# Patient Record
Sex: MALE | Race: WHITE | Employment: OTHER | URBAN - METROPOLITAN AREA
[De-identification: names, ages, dates, MRNs, and addresses within clinical notes are randomized per-mention and may not be internally consistent; named-entity substitution may affect disease eponyms.]

---

## 2017-03-14 PROBLEM — S71.151A DOG BITE OF RIGHT THIGH: Status: ACTIVE | Noted: 2017-03-14
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
## 2019-01-01 ENCOUNTER — APPOINTMENT (OUTPATIENT)
Dept: CT IMAGING | Age: 74
DRG: 378 | End: 2019-01-01
Payer: MEDICARE

## 2019-01-01 ENCOUNTER — ANESTHESIA (OUTPATIENT)
Dept: ENDOSCOPY | Age: 74
End: 2019-01-01
Payer: MEDICARE

## 2019-01-01 ENCOUNTER — ANESTHESIA EVENT (OUTPATIENT)
Dept: ENDOSCOPY | Age: 74
End: 2019-01-01
Payer: MEDICARE

## 2019-01-01 ENCOUNTER — TELEPHONE (OUTPATIENT)
Dept: CARDIOLOGY CLINIC | Age: 74
End: 2019-01-01

## 2019-01-01 ENCOUNTER — APPOINTMENT (OUTPATIENT)
Dept: GENERAL RADIOLOGY | Age: 74
DRG: 394 | End: 2019-01-01
Payer: MEDICARE

## 2019-01-01 ENCOUNTER — ANESTHESIA (OUTPATIENT)
Dept: ENDOSCOPY | Age: 74
DRG: 394 | End: 2019-01-01
Payer: MEDICARE

## 2019-01-01 ENCOUNTER — ANESTHESIA (OUTPATIENT)
Dept: ENDOSCOPY | Age: 74
DRG: 378 | End: 2019-01-01
Payer: MEDICARE

## 2019-01-01 ENCOUNTER — ANESTHESIA EVENT (OUTPATIENT)
Dept: ENDOSCOPY | Age: 74
DRG: 394 | End: 2019-01-01
Payer: MEDICARE

## 2019-01-01 ENCOUNTER — HOSPITAL ENCOUNTER (INPATIENT)
Age: 74
LOS: 4 days | Discharge: HOME | DRG: 394 | End: 2019-11-03
Attending: EMERGENCY MEDICINE | Admitting: STUDENT IN AN ORGANIZED HEALTH CARE EDUCATION/TRAINING PROGRAM
Payer: MEDICARE

## 2019-01-01 ENCOUNTER — APPOINTMENT (OUTPATIENT)
Dept: CT IMAGING | Age: 74
DRG: 394 | End: 2019-01-01
Payer: MEDICARE

## 2019-01-01 ENCOUNTER — ANESTHESIA EVENT (OUTPATIENT)
Dept: ENDOSCOPY | Age: 74
DRG: 378 | End: 2019-01-01
Payer: MEDICARE

## 2019-01-01 ENCOUNTER — HOSPITAL ENCOUNTER (OUTPATIENT)
Age: 74
Setting detail: OUTPATIENT SURGERY
Discharge: HOME | End: 2019-12-09
Attending: INTERNAL MEDICINE | Admitting: INTERNAL MEDICINE
Payer: MEDICARE

## 2019-01-01 ENCOUNTER — HOSPITAL ENCOUNTER (INPATIENT)
Age: 74
LOS: 4 days | Discharge: NURSING, REHAB OR LONG-TERM CARE | DRG: 378 | End: 2019-04-25
Attending: EMERGENCY MEDICINE | Admitting: RADIOLOGY
Payer: MEDICARE

## 2019-01-01 VITALS
RESPIRATION RATE: 18 BRPM | OXYGEN SATURATION: 100 % | SYSTOLIC BLOOD PRESSURE: 108 MMHG | DIASTOLIC BLOOD PRESSURE: 54 MMHG

## 2019-01-01 VITALS
SYSTOLIC BLOOD PRESSURE: 91 MMHG | RESPIRATION RATE: 15 BRPM | OXYGEN SATURATION: 93 % | DIASTOLIC BLOOD PRESSURE: 55 MMHG

## 2019-01-01 VITALS
TEMPERATURE: 98.6 F | SYSTOLIC BLOOD PRESSURE: 131 MMHG | DIASTOLIC BLOOD PRESSURE: 73 MMHG | OXYGEN SATURATION: 97 % | RESPIRATION RATE: 18 BRPM | BODY MASS INDEX: 26.96 KG/M2 | HEIGHT: 69 IN | WEIGHT: 182 LBS | HEART RATE: 65 BPM

## 2019-01-01 VITALS
TEMPERATURE: 97.1 F | BODY MASS INDEX: 26.05 KG/M2 | SYSTOLIC BLOOD PRESSURE: 148 MMHG | OXYGEN SATURATION: 96 % | RESPIRATION RATE: 18 BRPM | WEIGHT: 182 LBS | DIASTOLIC BLOOD PRESSURE: 73 MMHG | HEART RATE: 76 BPM | HEIGHT: 70 IN

## 2019-01-01 VITALS
RESPIRATION RATE: 20 BRPM | WEIGHT: 184 LBS | TEMPERATURE: 96.8 F | OXYGEN SATURATION: 96 % | DIASTOLIC BLOOD PRESSURE: 67 MMHG | HEART RATE: 55 BPM | SYSTOLIC BLOOD PRESSURE: 132 MMHG | HEIGHT: 69 IN | BODY MASS INDEX: 27.25 KG/M2

## 2019-01-01 VITALS
RESPIRATION RATE: 21 BRPM | SYSTOLIC BLOOD PRESSURE: 144 MMHG | OXYGEN SATURATION: 100 % | DIASTOLIC BLOOD PRESSURE: 85 MMHG

## 2019-01-01 DIAGNOSIS — K92.2 GASTROINTESTINAL HEMORRHAGE, UNSPECIFIED GASTROINTESTINAL HEMORRHAGE TYPE: ICD-10-CM

## 2019-01-01 DIAGNOSIS — T83.511A URINARY TRACT INFECTION ASSOCIATED WITH INDWELLING URETHRAL CATHETER, INITIAL ENCOUNTER (HCC): Primary | ICD-10-CM

## 2019-01-01 DIAGNOSIS — C61 PROSTATE CANCER METASTATIC TO MULTIPLE SITES (HCC): ICD-10-CM

## 2019-01-01 DIAGNOSIS — K92.2 GASTROINTESTINAL HEMORRHAGE, UNSPECIFIED GASTROINTESTINAL HEMORRHAGE TYPE: Primary | ICD-10-CM

## 2019-01-01 DIAGNOSIS — N39.0 URINARY TRACT INFECTION ASSOCIATED WITH INDWELLING URETHRAL CATHETER, INITIAL ENCOUNTER (HCC): Primary | ICD-10-CM

## 2019-01-01 DIAGNOSIS — E87.1 HYPONATREMIA: ICD-10-CM

## 2019-01-01 DIAGNOSIS — D64.9 ANEMIA, UNSPECIFIED TYPE: ICD-10-CM

## 2019-01-01 LAB
ABO/RH: NORMAL
ALBUMIN SERPL-MCNC: 2.9 G/DL (ref 3.5–5.2)
ALBUMIN SERPL-MCNC: 3.2 G/DL (ref 3.5–5.2)
ALBUMIN SERPL-MCNC: 3.4 G/DL (ref 3.5–5.2)
ALBUMIN SERPL-MCNC: 3.4 G/DL (ref 3.5–5.2)
ALP BLD-CCNC: 40 U/L (ref 40–129)
ALP BLD-CCNC: 44 U/L (ref 40–129)
ALP BLD-CCNC: 51 U/L (ref 40–129)
ALP BLD-CCNC: 53 U/L (ref 40–129)
ALT SERPL-CCNC: 5 U/L (ref 0–40)
ALT SERPL-CCNC: 8 U/L (ref 0–40)
ALT SERPL-CCNC: 9 U/L (ref 0–40)
ALT SERPL-CCNC: 9 U/L (ref 0–40)
AMORPHOUS: ABNORMAL
ANION GAP SERPL CALCULATED.3IONS-SCNC: 11 MMOL/L (ref 7–16)
ANION GAP SERPL CALCULATED.3IONS-SCNC: 12 MMOL/L (ref 7–16)
ANION GAP SERPL CALCULATED.3IONS-SCNC: 14 MMOL/L (ref 7–16)
ANION GAP SERPL CALCULATED.3IONS-SCNC: 15 MMOL/L (ref 7–16)
ANION GAP SERPL CALCULATED.3IONS-SCNC: 8 MMOL/L (ref 7–16)
ANION GAP SERPL CALCULATED.3IONS-SCNC: 9 MMOL/L (ref 7–16)
ANION GAP SERPL CALCULATED.3IONS-SCNC: 9 MMOL/L (ref 7–16)
ANTIBODY SCREEN: NORMAL
APTT: 34.9 SEC (ref 24.5–35.1)
AST SERPL-CCNC: 11 U/L (ref 0–39)
AST SERPL-CCNC: 13 U/L (ref 0–39)
AST SERPL-CCNC: 13 U/L (ref 0–39)
AST SERPL-CCNC: 15 U/L (ref 0–39)
BACTERIA: ABNORMAL /HPF
BASOPHILS ABSOLUTE: 0 E9/L (ref 0–0.2)
BASOPHILS ABSOLUTE: 0 E9/L (ref 0–0.2)
BASOPHILS RELATIVE PERCENT: 0.1 % (ref 0–2)
BASOPHILS RELATIVE PERCENT: 0.3 % (ref 0–2)
BILIRUB SERPL-MCNC: 0.4 MG/DL (ref 0–1.2)
BILIRUB SERPL-MCNC: 0.5 MG/DL (ref 0–1.2)
BILIRUBIN DIRECT: <0.2 MG/DL (ref 0–0.3)
BILIRUBIN URINE: ABNORMAL
BILIRUBIN URINE: NEGATIVE
BILIRUBIN URINE: NEGATIVE
BILIRUBIN, INDIRECT: ABNORMAL MG/DL (ref 0–1)
BLOOD, URINE: ABNORMAL
BUN BLDV-MCNC: 10 MG/DL (ref 8–23)
BUN BLDV-MCNC: 11 MG/DL (ref 8–23)
BUN BLDV-MCNC: 12 MG/DL (ref 8–23)
BUN BLDV-MCNC: 13 MG/DL (ref 8–23)
BUN BLDV-MCNC: 16 MG/DL (ref 8–23)
BUN BLDV-MCNC: 17 MG/DL (ref 8–23)
BUN BLDV-MCNC: 21 MG/DL (ref 8–23)
BURR CELLS: ABNORMAL
CALCIUM SERPL-MCNC: 6.3 MG/DL (ref 8.6–10.2)
CALCIUM SERPL-MCNC: 6.6 MG/DL (ref 8.6–10.2)
CALCIUM SERPL-MCNC: 6.6 MG/DL (ref 8.6–10.2)
CALCIUM SERPL-MCNC: 7.2 MG/DL (ref 8.6–10.2)
CALCIUM SERPL-MCNC: 8 MG/DL (ref 8.6–10.2)
CALCIUM SERPL-MCNC: 8.1 MG/DL (ref 8.6–10.2)
CALCIUM SERPL-MCNC: 8.1 MG/DL (ref 8.6–10.2)
CASTS: ABNORMAL /LPF
CEA: 85.7 NG/ML (ref 0–5.2)
CHLORIDE BLD-SCNC: 102 MMOL/L (ref 98–107)
CHLORIDE BLD-SCNC: 104 MMOL/L (ref 98–107)
CHLORIDE BLD-SCNC: 107 MMOL/L (ref 98–107)
CHLORIDE BLD-SCNC: 107 MMOL/L (ref 98–107)
CHLORIDE BLD-SCNC: 108 MMOL/L (ref 98–107)
CHLORIDE BLD-SCNC: 110 MMOL/L (ref 98–107)
CHLORIDE BLD-SCNC: 97 MMOL/L (ref 98–107)
CLARITY: ABNORMAL
CLARITY: ABNORMAL
CLARITY: CLEAR
CO2: 18 MMOL/L (ref 22–29)
CO2: 21 MMOL/L (ref 22–29)
CO2: 22 MMOL/L (ref 22–29)
CO2: 23 MMOL/L (ref 22–29)
CO2: 23 MMOL/L (ref 22–29)
CO2: 24 MMOL/L (ref 22–29)
COLOR: YELLOW
CREAT SERPL-MCNC: 0.6 MG/DL (ref 0.7–1.2)
CREAT SERPL-MCNC: 0.7 MG/DL (ref 0.7–1.2)
CREAT SERPL-MCNC: 0.8 MG/DL (ref 0.7–1.2)
CREAT SERPL-MCNC: 0.9 MG/DL (ref 0.7–1.2)
EKG ATRIAL RATE: 49 BPM
EKG ATRIAL RATE: 55 BPM
EKG ATRIAL RATE: 75 BPM
EKG P AXIS: 28 DEGREES
EKG P AXIS: 30 DEGREES
EKG P AXIS: 57 DEGREES
EKG P-R INTERVAL: 162 MS
EKG P-R INTERVAL: 172 MS
EKG P-R INTERVAL: 190 MS
EKG Q-T INTERVAL: 418 MS
EKG Q-T INTERVAL: 498 MS
EKG Q-T INTERVAL: 502 MS
EKG QRS DURATION: 104 MS
EKG QRS DURATION: 106 MS
EKG QRS DURATION: 96 MS
EKG QTC CALCULATION (BAZETT): 449 MS
EKG QTC CALCULATION (BAZETT): 466 MS
EKG QTC CALCULATION (BAZETT): 480 MS
EKG R AXIS: -2 DEGREES
EKG R AXIS: 15 DEGREES
EKG R AXIS: 9 DEGREES
EKG T AXIS: -52 DEGREES
EKG T AXIS: 24 DEGREES
EKG T AXIS: 24 DEGREES
EKG VENTRICULAR RATE: 49 BPM
EKG VENTRICULAR RATE: 55 BPM
EKG VENTRICULAR RATE: 75 BPM
EOSINOPHILS ABSOLUTE: 0.16 E9/L (ref 0.05–0.5)
EOSINOPHILS ABSOLUTE: 0.19 E9/L (ref 0.05–0.5)
EOSINOPHILS RELATIVE PERCENT: 2.6 % (ref 0–6)
EOSINOPHILS RELATIVE PERCENT: 2.6 % (ref 0–6)
EPITHELIAL CELLS, UA: ABNORMAL /HPF
EPITHELIAL CELLS, UA: ABNORMAL /HPF
FERRITIN: 59 NG/ML
FOLATE: 16.5 NG/ML (ref 4.8–24.2)
GFR AFRICAN AMERICAN: >60
GFR NON-AFRICAN AMERICAN: >60 ML/MIN/1.73
GLUCOSE BLD-MCNC: 115 MG/DL (ref 74–99)
GLUCOSE BLD-MCNC: 117 MG/DL (ref 74–99)
GLUCOSE BLD-MCNC: 118 MG/DL (ref 74–99)
GLUCOSE BLD-MCNC: 118 MG/DL (ref 74–99)
GLUCOSE BLD-MCNC: 130 MG/DL (ref 74–99)
GLUCOSE BLD-MCNC: 135 MG/DL (ref 74–99)
GLUCOSE BLD-MCNC: 135 MG/DL (ref 74–99)
GLUCOSE BLD-MCNC: 163 MG/DL (ref 74–99)
GLUCOSE URINE: NEGATIVE MG/DL
HCT VFR BLD CALC: 26.1 % (ref 37–54)
HCT VFR BLD CALC: 26.1 % (ref 37–54)
HCT VFR BLD CALC: 26.2 % (ref 37–54)
HCT VFR BLD CALC: 26.3 % (ref 37–54)
HCT VFR BLD CALC: 26.4 % (ref 37–54)
HCT VFR BLD CALC: 27.3 % (ref 37–54)
HCT VFR BLD CALC: 27.7 % (ref 37–54)
HCT VFR BLD CALC: 28.8 % (ref 37–54)
HCT VFR BLD CALC: 30.8 % (ref 37–54)
HCT VFR BLD CALC: 32.4 % (ref 37–54)
HCT VFR BLD CALC: 32.9 % (ref 37–54)
HCT VFR BLD CALC: 33.2 % (ref 37–54)
HCT VFR BLD CALC: 33.3 % (ref 37–54)
HCT VFR BLD CALC: 33.4 % (ref 37–54)
HCT VFR BLD CALC: 33.6 % (ref 37–54)
HCT VFR BLD CALC: 34.6 % (ref 37–54)
HCT VFR BLD CALC: 34.8 % (ref 37–54)
HCT VFR BLD CALC: 35.6 % (ref 37–54)
HCT VFR BLD CALC: 35.7 % (ref 37–54)
HCT VFR BLD CALC: 35.9 % (ref 37–54)
HCT VFR BLD CALC: 36.1 % (ref 37–54)
HCT VFR BLD CALC: 36.7 % (ref 37–54)
HCT VFR BLD CALC: 36.8 % (ref 37–54)
HCT VFR BLD CALC: 38.2 % (ref 37–54)
HCT VFR BLD CALC: 38.6 % (ref 37–54)
HEMOGLOBIN: 10.2 G/DL (ref 12.5–16.5)
HEMOGLOBIN: 10.7 G/DL (ref 12.5–16.5)
HEMOGLOBIN: 10.8 G/DL (ref 12.5–16.5)
HEMOGLOBIN: 10.9 G/DL (ref 12.5–16.5)
HEMOGLOBIN: 11.1 G/DL (ref 12.5–16.5)
HEMOGLOBIN: 11.3 G/DL (ref 12.5–16.5)
HEMOGLOBIN: 11.4 G/DL (ref 12.5–16.5)
HEMOGLOBIN: 11.4 G/DL (ref 12.5–16.5)
HEMOGLOBIN: 11.7 G/DL (ref 12.5–16.5)
HEMOGLOBIN: 11.8 G/DL (ref 12.5–16.5)
HEMOGLOBIN: 12 G/DL (ref 12.5–16.5)
HEMOGLOBIN: 12.4 G/DL (ref 12.5–16.5)
HEMOGLOBIN: 12.5 G/DL (ref 12.5–16.5)
HEMOGLOBIN: 8.1 G/DL (ref 12.5–16.5)
HEMOGLOBIN: 8.2 G/DL (ref 12.5–16.5)
HEMOGLOBIN: 8.3 G/DL (ref 12.5–16.5)
HEMOGLOBIN: 8.3 G/DL (ref 12.5–16.5)
HEMOGLOBIN: 8.4 G/DL (ref 12.5–16.5)
HEMOGLOBIN: 8.6 G/DL (ref 12.5–16.5)
HEMOGLOBIN: 8.6 G/DL (ref 12.5–16.5)
HEMOGLOBIN: 8.9 G/DL (ref 12.5–16.5)
INR BLD: 1
INR BLD: 1.2
IRON SATURATION: 10 % (ref 20–55)
IRON: 27 MCG/DL (ref 59–158)
KETONES, URINE: >=80 MG/DL
KETONES, URINE: ABNORMAL MG/DL
KETONES, URINE: NEGATIVE MG/DL
LACTIC ACID, SEPSIS: 3.2 MMOL/L (ref 0.5–1.9)
LACTIC ACID: 1.2 MMOL/L (ref 0.5–2.2)
LACTIC ACID: 1.4 MMOL/L (ref 0.5–2.2)
LEUKOCYTE ESTERASE, URINE: ABNORMAL
LIPASE: 32 U/L (ref 13–60)
LYMPHOCYTES ABSOLUTE: 0.29 E9/L (ref 1.5–4)
LYMPHOCYTES ABSOLUTE: 0.32 E9/L (ref 1.5–4)
LYMPHOCYTES RELATIVE PERCENT: 3.5 % (ref 20–42)
LYMPHOCYTES RELATIVE PERCENT: 5.2 % (ref 20–42)
MAGNESIUM: 1.7 MG/DL (ref 1.6–2.6)
MAGNESIUM: 2 MG/DL (ref 1.6–2.6)
MAGNESIUM: 2.3 MG/DL (ref 1.6–2.6)
MAGNESIUM: 3 MG/DL (ref 1.6–2.6)
MCH RBC QN AUTO: 29 PG (ref 26–35)
MCH RBC QN AUTO: 30.4 PG (ref 26–35)
MCH RBC QN AUTO: 30.7 PG (ref 26–35)
MCH RBC QN AUTO: 31 PG (ref 26–35)
MCHC RBC AUTO-ENTMCNC: 31.5 % (ref 32–34.5)
MCHC RBC AUTO-ENTMCNC: 31.6 % (ref 32–34.5)
MCHC RBC AUTO-ENTMCNC: 31.8 % (ref 32–34.5)
MCHC RBC AUTO-ENTMCNC: 33 % (ref 32–34.5)
MCV RBC AUTO: 87.7 FL (ref 80–99.9)
MCV RBC AUTO: 96.5 FL (ref 80–99.9)
MCV RBC AUTO: 97.4 FL (ref 80–99.9)
MCV RBC AUTO: 97.4 FL (ref 80–99.9)
MONOCYTES ABSOLUTE: 0.25 E9/L (ref 0.1–0.95)
MONOCYTES ABSOLUTE: 0.58 E9/L (ref 0.1–0.95)
MONOCYTES RELATIVE PERCENT: 4.3 % (ref 2–12)
MONOCYTES RELATIVE PERCENT: 7.8 % (ref 2–12)
MYELOCYTE PERCENT: 1.7 % (ref 0–0)
NEUTROPHILS ABSOLUTE: 5.54 E9/L (ref 1.8–7.3)
NEUTROPHILS ABSOLUTE: 6.19 E9/L (ref 1.8–7.3)
NEUTROPHILS RELATIVE PERCENT: 84.3 % (ref 43–80)
NEUTROPHILS RELATIVE PERCENT: 87.8 % (ref 43–80)
NITRITE, URINE: NEGATIVE
NITRITE, URINE: POSITIVE
NITRITE, URINE: POSITIVE
OCCULT BLOOD SCREENING: NORMAL
ORGANISM: ABNORMAL
OVALOCYTES: ABNORMAL
PDW BLD-RTO: 13.2 FL (ref 11.5–15)
PDW BLD-RTO: 16.2 FL (ref 11.5–15)
PDW BLD-RTO: 16.3 FL (ref 11.5–15)
PDW BLD-RTO: 16.8 FL (ref 11.5–15)
PH UA: 5.5 (ref 5–9)
PH UA: 6 (ref 5–9)
PH UA: 7.5 (ref 5–9)
PLATELET # BLD: 196 E9/L (ref 130–450)
PLATELET # BLD: 237 E9/L (ref 130–450)
PLATELET # BLD: 256 E9/L (ref 130–450)
PLATELET # BLD: 271 E9/L (ref 130–450)
PMV BLD AUTO: 8.6 FL (ref 7–12)
PMV BLD AUTO: 8.6 FL (ref 7–12)
PMV BLD AUTO: 8.8 FL (ref 7–12)
PMV BLD AUTO: 8.9 FL (ref 7–12)
POC ANION GAP: 13 MMOL/L (ref 7–16)
POC BUN: 15 MG/DL (ref 8–23)
POC CHLORIDE: 103 MMOL/L (ref 100–108)
POC CREATININE: 0.8 MG/DL (ref 0.7–1.2)
POC POTASSIUM: 3.7 MMOL/L (ref 3.5–5)
POC SODIUM: 137 MMOL/L (ref 132–146)
POIKILOCYTES: ABNORMAL
POIKILOCYTES: ABNORMAL
POTASSIUM REFLEX MAGNESIUM: 3.5 MMOL/L (ref 3.5–5)
POTASSIUM REFLEX MAGNESIUM: 3.6 MMOL/L (ref 3.5–5)
POTASSIUM REFLEX MAGNESIUM: 4 MMOL/L (ref 3.5–5)
POTASSIUM SERPL-SCNC: 2.8 MMOL/L (ref 3.5–5)
POTASSIUM SERPL-SCNC: 3.2 MMOL/L (ref 3.5–5)
POTASSIUM SERPL-SCNC: 3.3 MMOL/L (ref 3.5–5)
POTASSIUM SERPL-SCNC: 3.4 MMOL/L (ref 3.5–5)
POTASSIUM SERPL-SCNC: 3.9 MMOL/L (ref 3.5–5)
PROTEIN UA: 100 MG/DL
PROTEIN UA: 30 MG/DL
PROTEIN UA: 30 MG/DL
PROTHROMBIN TIME: 11.6 SEC (ref 9.3–12.4)
PROTHROMBIN TIME: 13.3 SEC (ref 9.3–12.4)
RBC # BLD: 2.7 E12/L (ref 3.8–5.8)
RBC # BLD: 2.71 E12/L (ref 3.8–5.8)
RBC # BLD: 2.83 E12/L (ref 3.8–5.8)
RBC # BLD: 3.83 E12/L (ref 3.8–5.8)
RBC UA: >20 /HPF (ref 0–2)
RBC UA: ABNORMAL /HPF (ref 0–2)
RBC UA: ABNORMAL /HPF (ref 0–2)
SODIUM BLD-SCNC: 131 MMOL/L (ref 132–146)
SODIUM BLD-SCNC: 135 MMOL/L (ref 132–146)
SODIUM BLD-SCNC: 139 MMOL/L (ref 132–146)
SODIUM BLD-SCNC: 139 MMOL/L (ref 132–146)
SODIUM BLD-SCNC: 140 MMOL/L (ref 132–146)
SODIUM BLD-SCNC: 140 MMOL/L (ref 132–146)
SODIUM BLD-SCNC: 141 MMOL/L (ref 132–146)
SPECIFIC GRAVITY UA: 1.01 (ref 1–1.03)
SPECIFIC GRAVITY UA: 1.01 (ref 1–1.03)
SPECIFIC GRAVITY UA: 1.02 (ref 1–1.03)
T4 TOTAL: 8.5 MCG/DL (ref 4.5–11.7)
TEAR DROP CELLS: ABNORMAL
TOTAL IRON BINDING CAPACITY: 278 MCG/DL (ref 250–450)
TOTAL PROTEIN: 5.1 G/DL (ref 6.4–8.3)
TOTAL PROTEIN: 5.6 G/DL (ref 6.4–8.3)
TOTAL PROTEIN: 5.8 G/DL (ref 6.4–8.3)
TOTAL PROTEIN: 5.9 G/DL (ref 6.4–8.3)
TROPONIN: <0.01 NG/ML (ref 0–0.03)
TSH SERPL DL<=0.05 MIU/L-ACNC: 3.34 UIU/ML (ref 0.27–4.2)
URINE CULTURE, ROUTINE: ABNORMAL
URINE CULTURE, ROUTINE: NORMAL
URINE CULTURE, ROUTINE: NORMAL
UROBILINOGEN, URINE: 0.2 E.U./DL
VITAMIN B-12: 404 PG/ML (ref 211–946)
WBC # BLD: 5.1 E9/L (ref 4.5–11.5)
WBC # BLD: 6.3 E9/L (ref 4.5–11.5)
WBC # BLD: 7.2 E9/L (ref 4.5–11.5)
WBC # BLD: 9.4 E9/L (ref 4.5–11.5)
WBC UA: >20 /HPF (ref 0–5)
WBC UA: >20 /HPF (ref 0–5)
WBC UA: ABNORMAL /HPF (ref 0–5)

## 2019-01-01 PROCEDURE — 2709999900 CT NEEDLE BIOPSY LIVER PERCUTANEOUS

## 2019-01-01 PROCEDURE — 1200000000 HC SEMI PRIVATE

## 2019-01-01 PROCEDURE — 83540 ASSAY OF IRON: CPT

## 2019-01-01 PROCEDURE — 86900 BLOOD TYPING SEROLOGIC ABO: CPT

## 2019-01-01 PROCEDURE — 36415 COLL VENOUS BLD VENIPUNCTURE: CPT

## 2019-01-01 PROCEDURE — 6360000002 HC RX W HCPCS: Performed by: INTERNAL MEDICINE

## 2019-01-01 PROCEDURE — 85018 HEMOGLOBIN: CPT

## 2019-01-01 PROCEDURE — 3700000000 HC ANESTHESIA ATTENDED CARE: Performed by: INTERNAL MEDICINE

## 2019-01-01 PROCEDURE — 97162 PT EVAL MOD COMPLEX 30 MIN: CPT | Performed by: PHYSICAL THERAPIST

## 2019-01-01 PROCEDURE — 7100000000 HC PACU RECOVERY - FIRST 15 MIN: Performed by: SURGERY

## 2019-01-01 PROCEDURE — 2580000003 HC RX 258: Performed by: NURSE PRACTITIONER

## 2019-01-01 PROCEDURE — 0FB03ZX EXCISION OF LIVER, PERCUTANEOUS APPROACH, DIAGNOSTIC: ICD-10-PCS | Performed by: RADIOLOGY

## 2019-01-01 PROCEDURE — 80048 BASIC METABOLIC PNL TOTAL CA: CPT

## 2019-01-01 PROCEDURE — 83605 ASSAY OF LACTIC ACID: CPT

## 2019-01-01 PROCEDURE — C9113 INJ PANTOPRAZOLE SODIUM, VIA: HCPCS | Performed by: STUDENT IN AN ORGANIZED HEALTH CARE EDUCATION/TRAINING PROGRAM

## 2019-01-01 PROCEDURE — 6370000000 HC RX 637 (ALT 250 FOR IP): Performed by: STUDENT IN AN ORGANIZED HEALTH CARE EDUCATION/TRAINING PROGRAM

## 2019-01-01 PROCEDURE — 6370000000 HC RX 637 (ALT 250 FOR IP): Performed by: INTERNAL MEDICINE

## 2019-01-01 PROCEDURE — 7100000010 HC PHASE II RECOVERY - FIRST 15 MIN: Performed by: INTERNAL MEDICINE

## 2019-01-01 PROCEDURE — 83550 IRON BINDING TEST: CPT

## 2019-01-01 PROCEDURE — 85610 PROTHROMBIN TIME: CPT

## 2019-01-01 PROCEDURE — 93010 ELECTROCARDIOGRAM REPORT: CPT | Performed by: INTERNAL MEDICINE

## 2019-01-01 PROCEDURE — 2720000010 HC SURG SUPPLY STERILE: Performed by: INTERNAL MEDICINE

## 2019-01-01 PROCEDURE — 88341 IMHCHEM/IMCYTCHM EA ADD ANTB: CPT

## 2019-01-01 PROCEDURE — 3700000001 HC ADD 15 MINUTES (ANESTHESIA): Performed by: INTERNAL MEDICINE

## 2019-01-01 PROCEDURE — 85014 HEMATOCRIT: CPT

## 2019-01-01 PROCEDURE — 6370000000 HC RX 637 (ALT 250 FOR IP): Performed by: NURSE PRACTITIONER

## 2019-01-01 PROCEDURE — 74177 CT ABD & PELVIS W/CONTRAST: CPT

## 2019-01-01 PROCEDURE — 6360000002 HC RX W HCPCS: Performed by: FAMILY MEDICINE

## 2019-01-01 PROCEDURE — 2709999900 HC NON-CHARGEABLE SUPPLY: Performed by: INTERNAL MEDICINE

## 2019-01-01 PROCEDURE — 99233 SBSQ HOSP IP/OBS HIGH 50: CPT | Performed by: INTERNAL MEDICINE

## 2019-01-01 PROCEDURE — 87088 URINE BACTERIA CULTURE: CPT

## 2019-01-01 PROCEDURE — APPSS45 APP SPLIT SHARED TIME 31-45 MINUTES: Performed by: NURSE PRACTITIONER

## 2019-01-01 PROCEDURE — 93005 ELECTROCARDIOGRAM TRACING: CPT | Performed by: INTERNAL MEDICINE

## 2019-01-01 PROCEDURE — 81001 URINALYSIS AUTO W/SCOPE: CPT

## 2019-01-01 PROCEDURE — 7100000001 HC PACU RECOVERY - ADDTL 15 MIN: Performed by: SURGERY

## 2019-01-01 PROCEDURE — 2580000003 HC RX 258: Performed by: INTERNAL MEDICINE

## 2019-01-01 PROCEDURE — 6360000004 HC RX CONTRAST MEDICATION: Performed by: RADIOLOGY

## 2019-01-01 PROCEDURE — 99239 HOSP IP/OBS DSCHRG MGMT >30: CPT | Performed by: INTERNAL MEDICINE

## 2019-01-01 PROCEDURE — 2580000003 HC RX 258: Performed by: ANESTHESIOLOGY

## 2019-01-01 PROCEDURE — 6360000002 HC RX W HCPCS: Performed by: EMERGENCY MEDICINE

## 2019-01-01 PROCEDURE — 99285 EMERGENCY DEPT VISIT HI MDM: CPT

## 2019-01-01 PROCEDURE — 82728 ASSAY OF FERRITIN: CPT

## 2019-01-01 PROCEDURE — 86901 BLOOD TYPING SEROLOGIC RH(D): CPT

## 2019-01-01 PROCEDURE — 84484 ASSAY OF TROPONIN QUANT: CPT

## 2019-01-01 PROCEDURE — 84436 ASSAY OF TOTAL THYROXINE: CPT

## 2019-01-01 PROCEDURE — APPSS30 APP SPLIT SHARED TIME 16-30 MINUTES: Performed by: NURSE PRACTITIONER

## 2019-01-01 PROCEDURE — 88307 TISSUE EXAM BY PATHOLOGIST: CPT

## 2019-01-01 PROCEDURE — 77012 CT SCAN FOR NEEDLE BIOPSY: CPT

## 2019-01-01 PROCEDURE — 3700000001 HC ADD 15 MINUTES (ANESTHESIA): Performed by: SURGERY

## 2019-01-01 PROCEDURE — 6360000002 HC RX W HCPCS: Performed by: STUDENT IN AN ORGANIZED HEALTH CARE EDUCATION/TRAINING PROGRAM

## 2019-01-01 PROCEDURE — G0328 FECAL BLOOD SCRN IMMUNOASSAY: HCPCS

## 2019-01-01 PROCEDURE — 82565 ASSAY OF CREATININE: CPT

## 2019-01-01 PROCEDURE — 83735 ASSAY OF MAGNESIUM: CPT

## 2019-01-01 PROCEDURE — 6370000000 HC RX 637 (ALT 250 FOR IP): Performed by: FAMILY MEDICINE

## 2019-01-01 PROCEDURE — 94660 CPAP INITIATION&MGMT: CPT

## 2019-01-01 PROCEDURE — 7100000011 HC PHASE II RECOVERY - ADDTL 15 MIN: Performed by: INTERNAL MEDICINE

## 2019-01-01 PROCEDURE — 82746 ASSAY OF FOLIC ACID SERUM: CPT

## 2019-01-01 PROCEDURE — 96365 THER/PROPH/DIAG IV INF INIT: CPT

## 2019-01-01 PROCEDURE — 2580000003 HC RX 258: Performed by: FAMILY MEDICINE

## 2019-01-01 PROCEDURE — 87077 CULTURE AEROBIC IDENTIFY: CPT

## 2019-01-01 PROCEDURE — 86850 RBC ANTIBODY SCREEN: CPT

## 2019-01-01 PROCEDURE — 2580000003 HC RX 258: Performed by: EMERGENCY MEDICINE

## 2019-01-01 PROCEDURE — 2580000003 HC RX 258: Performed by: RADIOLOGY

## 2019-01-01 PROCEDURE — 84443 ASSAY THYROID STIM HORMONE: CPT

## 2019-01-01 PROCEDURE — 82947 ASSAY GLUCOSE BLOOD QUANT: CPT

## 2019-01-01 PROCEDURE — 97530 THERAPEUTIC ACTIVITIES: CPT | Performed by: PHYSICAL THERAPIST

## 2019-01-01 PROCEDURE — 80051 ELECTROLYTE PANEL: CPT

## 2019-01-01 PROCEDURE — 97530 THERAPEUTIC ACTIVITIES: CPT

## 2019-01-01 PROCEDURE — 85025 COMPLETE CBC W/AUTO DIFF WBC: CPT

## 2019-01-01 PROCEDURE — 97166 OT EVAL MOD COMPLEX 45 MIN: CPT

## 2019-01-01 PROCEDURE — 6360000002 HC RX W HCPCS

## 2019-01-01 PROCEDURE — 82607 VITAMIN B-12: CPT

## 2019-01-01 PROCEDURE — 0DJD8ZZ INSPECTION OF LOWER INTESTINAL TRACT, VIA NATURAL OR ARTIFICIAL OPENING ENDOSCOPIC: ICD-10-PCS | Performed by: SURGERY

## 2019-01-01 PROCEDURE — 6360000002 HC RX W HCPCS: Performed by: NURSE ANESTHETIST, CERTIFIED REGISTERED

## 2019-01-01 PROCEDURE — 85730 THROMBOPLASTIN TIME PARTIAL: CPT

## 2019-01-01 PROCEDURE — 3609008400 HC SIGMOIDOSCOPY DIAGNOSTIC: Performed by: INTERNAL MEDICINE

## 2019-01-01 PROCEDURE — 82378 CARCINOEMBRYONIC ANTIGEN: CPT

## 2019-01-01 PROCEDURE — 3609009500 HC COLONOSCOPY DIAGNOSTIC OR SCREENING: Performed by: SURGERY

## 2019-01-01 PROCEDURE — 80076 HEPATIC FUNCTION PANEL: CPT

## 2019-01-01 PROCEDURE — 6370000000 HC RX 637 (ALT 250 FOR IP): Performed by: SURGERY

## 2019-01-01 PROCEDURE — 80053 COMPREHEN METABOLIC PANEL: CPT

## 2019-01-01 PROCEDURE — 2580000003 HC RX 258: Performed by: STUDENT IN AN ORGANIZED HEALTH CARE EDUCATION/TRAINING PROGRAM

## 2019-01-01 PROCEDURE — 2500000003 HC RX 250 WO HCPCS: Performed by: RADIOLOGY

## 2019-01-01 PROCEDURE — 88342 IMHCHEM/IMCYTCHM 1ST ANTB: CPT

## 2019-01-01 PROCEDURE — 84132 ASSAY OF SERUM POTASSIUM: CPT

## 2019-01-01 PROCEDURE — 85027 COMPLETE CBC AUTOMATED: CPT

## 2019-01-01 PROCEDURE — 71045 X-RAY EXAM CHEST 1 VIEW: CPT

## 2019-01-01 PROCEDURE — 2709999900 HC NON-CHARGEABLE SUPPLY: Performed by: SURGERY

## 2019-01-01 PROCEDURE — 93005 ELECTROCARDIOGRAM TRACING: CPT | Performed by: EMERGENCY MEDICINE

## 2019-01-01 PROCEDURE — 93005 ELECTROCARDIOGRAM TRACING: CPT | Performed by: NURSE PRACTITIONER

## 2019-01-01 PROCEDURE — 99223 1ST HOSP IP/OBS HIGH 75: CPT | Performed by: INTERNAL MEDICINE

## 2019-01-01 PROCEDURE — 0DJD8ZZ INSPECTION OF LOWER INTESTINAL TRACT, VIA NATURAL OR ARTIFICIAL OPENING ENDOSCOPIC: ICD-10-PCS | Performed by: INTERNAL MEDICINE

## 2019-01-01 PROCEDURE — 84520 ASSAY OF UREA NITROGEN: CPT

## 2019-01-01 PROCEDURE — 87186 SC STD MICRODIL/AGAR DIL: CPT

## 2019-01-01 PROCEDURE — 88360 TUMOR IMMUNOHISTOCHEM/MANUAL: CPT

## 2019-01-01 PROCEDURE — 83690 ASSAY OF LIPASE: CPT

## 2019-01-01 PROCEDURE — 3609009900 HC COLONOSCOPY W/CONTROL BLEEDING ANY METHOD: Performed by: INTERNAL MEDICINE

## 2019-01-01 PROCEDURE — 3700000000 HC ANESTHESIA ATTENDED CARE: Performed by: SURGERY

## 2019-01-01 RX ORDER — MONTELUKAST SODIUM 10 MG/1
10 TABLET ORAL NIGHTLY
Status: DISCONTINUED | OUTPATIENT
Start: 2019-01-01 | End: 2019-01-01 | Stop reason: HOSPADM

## 2019-01-01 RX ORDER — METOPROLOL TARTRATE 50 MG/1
100 TABLET, FILM COATED ORAL 2 TIMES DAILY
Status: DISCONTINUED | OUTPATIENT
Start: 2019-01-01 | End: 2019-01-01 | Stop reason: HOSPADM

## 2019-01-01 RX ORDER — OXYCODONE AND ACETAMINOPHEN 10; 325 MG/1; MG/1
1 TABLET ORAL EVERY 6 HOURS PRN
Qty: 28 TABLET | Refills: 0 | Status: SHIPPED | OUTPATIENT
Start: 2019-01-01 | End: 2019-01-01

## 2019-01-01 RX ORDER — OXYBUTYNIN CHLORIDE 5 MG/1
10 TABLET, EXTENDED RELEASE ORAL DAILY PRN
Status: DISCONTINUED | OUTPATIENT
Start: 2019-01-01 | End: 2019-01-01 | Stop reason: HOSPADM

## 2019-01-01 RX ORDER — LIDOCAINE HYDROCHLORIDE 20 MG/ML
12 INJECTION, SOLUTION INFILTRATION; PERINEURAL ONCE
Status: COMPLETED | OUTPATIENT
Start: 2019-01-01 | End: 2019-01-01

## 2019-01-01 RX ORDER — CALCIUM POLYCARBOPHIL 625 MG 625 MG/1
625 TABLET ORAL DAILY
Status: DISCONTINUED | OUTPATIENT
Start: 2019-01-01 | End: 2019-01-01 | Stop reason: HOSPADM

## 2019-01-01 RX ORDER — PROPOFOL 10 MG/ML
INJECTION, EMULSION INTRAVENOUS CONTINUOUS PRN
Status: DISCONTINUED | OUTPATIENT
Start: 2019-01-01 | End: 2019-01-01 | Stop reason: SDUPTHER

## 2019-01-01 RX ORDER — CALCIUM POLYCARBOPHIL 625 MG 625 MG/1
625 TABLET ORAL DAILY
Refills: 0 | COMMUNITY
Start: 2019-01-01 | End: 2019-01-01

## 2019-01-01 RX ORDER — AMLODIPINE BESYLATE 5 MG/1
5 TABLET ORAL DAILY
COMMUNITY
End: 2020-01-01

## 2019-01-01 RX ORDER — ONDANSETRON 4 MG/1
4 TABLET, FILM COATED ORAL DAILY PRN
Status: ON HOLD | COMMUNITY
End: 2020-01-01 | Stop reason: SDUPTHER

## 2019-01-01 RX ORDER — TAMSULOSIN HYDROCHLORIDE 0.4 MG/1
0.8 CAPSULE ORAL NIGHTLY
Status: DISCONTINUED | OUTPATIENT
Start: 2019-01-01 | End: 2019-01-01 | Stop reason: HOSPADM

## 2019-01-01 RX ORDER — CHLORAL HYDRATE 500 MG
1000 CAPSULE ORAL DAILY
Status: DISCONTINUED | OUTPATIENT
Start: 2019-01-01 | End: 2019-01-01 | Stop reason: CLARIF

## 2019-01-01 RX ORDER — MELOXICAM 7.5 MG/1
7.5 TABLET ORAL DAILY
COMMUNITY
End: 2019-01-01 | Stop reason: DRUGHIGH

## 2019-01-01 RX ORDER — AMLODIPINE BESYLATE 5 MG/1
5 TABLET ORAL 2 TIMES DAILY
Status: DISCONTINUED | OUTPATIENT
Start: 2019-01-01 | End: 2019-01-01 | Stop reason: HOSPADM

## 2019-01-01 RX ORDER — SODIUM CHLORIDE, SODIUM LACTATE, POTASSIUM CHLORIDE, CALCIUM CHLORIDE 600; 310; 30; 20 MG/100ML; MG/100ML; MG/100ML; MG/100ML
INJECTION, SOLUTION INTRAVENOUS CONTINUOUS
Status: DISCONTINUED | OUTPATIENT
Start: 2019-01-01 | End: 2019-01-01 | Stop reason: HOSPADM

## 2019-01-01 RX ORDER — POLYETHYLENE GLYCOL 3350, SODIUM CHLORIDE, SODIUM BICARBONATE, POTASSIUM CHLORIDE 420; 11.2; 5.72; 1.48 G/4L; G/4L; G/4L; G/4L
4000 POWDER, FOR SOLUTION ORAL ONCE
Status: COMPLETED | OUTPATIENT
Start: 2019-01-01 | End: 2019-01-01

## 2019-01-01 RX ORDER — HYDROCORTISONE ACETATE 25 MG/1
25 SUPPOSITORY RECTAL 2 TIMES DAILY
Status: DISCONTINUED | OUTPATIENT
Start: 2019-01-01 | End: 2019-01-01 | Stop reason: HOSPADM

## 2019-01-01 RX ORDER — METOPROLOL SUCCINATE 50 MG/1
50 TABLET, EXTENDED RELEASE ORAL DAILY
Status: DISCONTINUED | OUTPATIENT
Start: 2019-01-01 | End: 2019-01-01 | Stop reason: HOSPADM

## 2019-01-01 RX ORDER — PROPOFOL 10 MG/ML
INJECTION, EMULSION INTRAVENOUS PRN
Status: DISCONTINUED | OUTPATIENT
Start: 2019-01-01 | End: 2019-01-01 | Stop reason: SDUPTHER

## 2019-01-01 RX ORDER — PANTOPRAZOLE SODIUM 40 MG/1
40 TABLET, DELAYED RELEASE ORAL DAILY
COMMUNITY
End: 2020-01-01

## 2019-01-01 RX ORDER — PANTOPRAZOLE SODIUM 40 MG/1
40 TABLET, DELAYED RELEASE ORAL DAILY
Status: DISCONTINUED | OUTPATIENT
Start: 2019-01-01 | End: 2019-01-01

## 2019-01-01 RX ORDER — PANTOPRAZOLE SODIUM 40 MG/10ML
80 INJECTION, POWDER, LYOPHILIZED, FOR SOLUTION INTRAVENOUS ONCE
Status: DISCONTINUED | OUTPATIENT
Start: 2019-01-01 | End: 2019-01-01 | Stop reason: CLARIF

## 2019-01-01 RX ORDER — PREGABALIN 50 MG/1
50 CAPSULE ORAL 2 TIMES DAILY
Status: DISCONTINUED | OUTPATIENT
Start: 2019-01-01 | End: 2019-01-01 | Stop reason: HOSPADM

## 2019-01-01 RX ORDER — SODIUM CHLORIDE 0.9 % (FLUSH) 0.9 %
10 SYRINGE (ML) INJECTION PRN
Status: DISCONTINUED | OUTPATIENT
Start: 2019-01-01 | End: 2019-01-01 | Stop reason: HOSPADM

## 2019-01-01 RX ORDER — METOPROLOL SUCCINATE 50 MG/1
50 TABLET, EXTENDED RELEASE ORAL DAILY
COMMUNITY
End: 2019-01-01 | Stop reason: DRUGHIGH

## 2019-01-01 RX ORDER — OXYCODONE HYDROCHLORIDE AND ACETAMINOPHEN 5; 325 MG/1; MG/1
2 TABLET ORAL EVERY 6 HOURS PRN
Status: DISCONTINUED | OUTPATIENT
Start: 2019-01-01 | End: 2019-01-01 | Stop reason: HOSPADM

## 2019-01-01 RX ORDER — SODIUM CHLORIDE 9 MG/ML
INJECTION, SOLUTION INTRAVENOUS CONTINUOUS
Status: DISCONTINUED | OUTPATIENT
Start: 2019-01-01 | End: 2019-01-01 | Stop reason: HOSPADM

## 2019-01-01 RX ORDER — POTASSIUM CHLORIDE 20 MEQ/1
40 TABLET, EXTENDED RELEASE ORAL PRN
Status: DISCONTINUED | OUTPATIENT
Start: 2019-01-01 | End: 2019-01-01 | Stop reason: HOSPADM

## 2019-01-01 RX ORDER — AMLODIPINE BESYLATE 5 MG/1
5 TABLET ORAL DAILY
Status: DISCONTINUED | OUTPATIENT
Start: 2019-01-01 | End: 2019-01-01 | Stop reason: HOSPADM

## 2019-01-01 RX ORDER — CHLORAL HYDRATE 500 MG
1000 CAPSULE ORAL DAILY
COMMUNITY
End: 2020-01-01

## 2019-01-01 RX ORDER — ONDANSETRON 2 MG/ML
4 INJECTION INTRAMUSCULAR; INTRAVENOUS EVERY 6 HOURS PRN
Status: DISCONTINUED | OUTPATIENT
Start: 2019-01-01 | End: 2019-01-01 | Stop reason: HOSPADM

## 2019-01-01 RX ORDER — POTASSIUM CHLORIDE 1500 MG/1
20 TABLET, FILM COATED, EXTENDED RELEASE ORAL EVERY OTHER DAY
COMMUNITY
End: 2019-01-01 | Stop reason: ALTCHOICE

## 2019-01-01 RX ORDER — OXYCODONE AND ACETAMINOPHEN 10; 325 MG/1; MG/1
1 TABLET ORAL EVERY 6 HOURS PRN
Status: ON HOLD | COMMUNITY
End: 2019-01-01 | Stop reason: SDUPTHER

## 2019-01-01 RX ORDER — ACETAMINOPHEN 325 MG/1
650 TABLET ORAL EVERY 4 HOURS PRN
Status: DISCONTINUED | OUTPATIENT
Start: 2019-01-01 | End: 2019-01-01 | Stop reason: HOSPADM

## 2019-01-01 RX ORDER — METOPROLOL TARTRATE 100 MG/1
100 TABLET ORAL 2 TIMES DAILY
Status: ON HOLD | COMMUNITY
End: 2020-01-01 | Stop reason: HOSPADM

## 2019-01-01 RX ORDER — PREGABALIN 50 MG/1
50 CAPSULE ORAL 2 TIMES DAILY
COMMUNITY
End: 2019-01-01 | Stop reason: ALTCHOICE

## 2019-01-01 RX ORDER — 0.9 % SODIUM CHLORIDE 0.9 %
1000 INTRAVENOUS SOLUTION INTRAVENOUS ONCE
Status: COMPLETED | OUTPATIENT
Start: 2019-01-01 | End: 2019-01-01

## 2019-01-01 RX ORDER — POTASSIUM CHLORIDE 7.45 MG/ML
10 INJECTION INTRAVENOUS PRN
Status: DISCONTINUED | OUTPATIENT
Start: 2019-01-01 | End: 2019-01-01 | Stop reason: HOSPADM

## 2019-01-01 RX ORDER — TAMSULOSIN HYDROCHLORIDE 0.4 MG/1
0.8 CAPSULE ORAL NIGHTLY
Status: ON HOLD | COMMUNITY
End: 2020-01-01 | Stop reason: HOSPADM

## 2019-01-01 RX ORDER — PANTOPRAZOLE SODIUM 40 MG/10ML
40 INJECTION, POWDER, LYOPHILIZED, FOR SOLUTION INTRAVENOUS 2 TIMES DAILY
Status: DISCONTINUED | OUTPATIENT
Start: 2019-01-01 | End: 2019-01-01

## 2019-01-01 RX ORDER — FLUTICASONE PROPIONATE 110 UG/1
2 AEROSOL, METERED RESPIRATORY (INHALATION) 2 TIMES DAILY
Status: DISCONTINUED | OUTPATIENT
Start: 2019-01-01 | End: 2019-01-01 | Stop reason: HOSPADM

## 2019-01-01 RX ORDER — AMIODARONE HYDROCHLORIDE 200 MG/1
200 TABLET ORAL DAILY
Status: ON HOLD | COMMUNITY
End: 2020-01-01 | Stop reason: HOSPADM

## 2019-01-01 RX ORDER — OXYCODONE AND ACETAMINOPHEN 10; 325 MG/1; MG/1
1 TABLET ORAL EVERY 6 HOURS PRN
Status: DISCONTINUED | OUTPATIENT
Start: 2019-01-01 | End: 2019-01-01 | Stop reason: HOSPADM

## 2019-01-01 RX ORDER — TAMSULOSIN HYDROCHLORIDE 0.4 MG/1
0.8 CAPSULE ORAL DAILY
Status: DISCONTINUED | OUTPATIENT
Start: 2019-01-01 | End: 2019-01-01 | Stop reason: HOSPADM

## 2019-01-01 RX ORDER — HYDROCORTISONE ACETATE 25 MG/1
25 SUPPOSITORY RECTAL 2 TIMES DAILY
Qty: 60 SUPPOSITORY | Refills: 0 | Status: SHIPPED | OUTPATIENT
Start: 2019-01-01 | End: 2020-01-01

## 2019-01-01 RX ORDER — OXYBUTYNIN CHLORIDE 10 MG/1
10 TABLET, EXTENDED RELEASE ORAL DAILY PRN
COMMUNITY
End: 2020-01-01

## 2019-01-01 RX ORDER — SODIUM CHLORIDE 0.9 % (FLUSH) 0.9 %
10 SYRINGE (ML) INJECTION PRN
Status: COMPLETED | OUTPATIENT
Start: 2019-01-01 | End: 2019-01-01

## 2019-01-01 RX ORDER — MELOXICAM 15 MG/1
15 TABLET ORAL DAILY PRN
Status: ON HOLD | COMMUNITY
End: 2019-01-01 | Stop reason: HOSPADM

## 2019-01-01 RX ORDER — AMIODARONE HYDROCHLORIDE 200 MG/1
200 TABLET ORAL DAILY
Status: DISCONTINUED | OUTPATIENT
Start: 2019-01-01 | End: 2019-01-01 | Stop reason: HOSPADM

## 2019-01-01 RX ORDER — SODIUM CHLORIDE 0.9 % (FLUSH) 0.9 %
10 SYRINGE (ML) INJECTION EVERY 12 HOURS SCHEDULED
Status: DISCONTINUED | OUTPATIENT
Start: 2019-01-01 | End: 2019-01-01 | Stop reason: HOSPADM

## 2019-01-01 RX ORDER — ALBUTEROL SULFATE 2.5 MG/3ML
2.5 SOLUTION RESPIRATORY (INHALATION) EVERY 6 HOURS PRN
Status: DISCONTINUED | OUTPATIENT
Start: 2019-01-01 | End: 2019-01-01 | Stop reason: HOSPADM

## 2019-01-01 RX ORDER — PROPOFOL 10 MG/ML
INJECTION, EMULSION INTRAVENOUS CONTINUOUS PRN
Status: DISCONTINUED | OUTPATIENT
Start: 2019-01-01 | End: 2019-01-01

## 2019-01-01 RX ORDER — POTASSIUM CHLORIDE 7.45 MG/ML
10 INJECTION INTRAVENOUS ONCE
Status: COMPLETED | OUTPATIENT
Start: 2019-01-01 | End: 2019-01-01

## 2019-01-01 RX ORDER — POTASSIUM CHLORIDE 7.45 MG/ML
10 INJECTION INTRAVENOUS
Status: DISCONTINUED | OUTPATIENT
Start: 2019-01-01 | End: 2019-01-01

## 2019-01-01 RX ADMIN — OXYCODONE HYDROCHLORIDE AND ACETAMINOPHEN 2 TABLET: 5; 325 TABLET ORAL at 21:59

## 2019-01-01 RX ADMIN — POTASSIUM CHLORIDE 40 MEQ: 20 TABLET, EXTENDED RELEASE ORAL at 08:45

## 2019-01-01 RX ADMIN — AMIODARONE HYDROCHLORIDE 200 MG: 200 TABLET ORAL at 08:07

## 2019-01-01 RX ADMIN — METOPROLOL SUCCINATE 50 MG: 50 TABLET, EXTENDED RELEASE ORAL at 09:45

## 2019-01-01 RX ADMIN — POTASSIUM CHLORIDE 10 MEQ: 7.46 INJECTION, SOLUTION INTRAVENOUS at 14:05

## 2019-01-01 RX ADMIN — AMLODIPINE BESYLATE 5 MG: 5 TABLET ORAL at 09:08

## 2019-01-01 RX ADMIN — PANTOPRAZOLE SODIUM 40 MG: 40 INJECTION, POWDER, FOR SOLUTION INTRAVENOUS at 08:59

## 2019-01-01 RX ADMIN — OXYCODONE HYDROCHLORIDE AND ACETAMINOPHEN 2 TABLET: 5; 325 TABLET ORAL at 17:22

## 2019-01-01 RX ADMIN — IOPAMIDOL 110 ML: 755 INJECTION, SOLUTION INTRAVENOUS at 02:52

## 2019-01-01 RX ADMIN — PREGABALIN 50 MG: 50 CAPSULE ORAL at 21:30

## 2019-01-01 RX ADMIN — OXYCODONE HYDROCHLORIDE AND ACETAMINOPHEN 2 TABLET: 5; 325 TABLET ORAL at 08:43

## 2019-01-01 RX ADMIN — CEFTRIAXONE SODIUM 1 G: 1 INJECTION, POWDER, FOR SOLUTION INTRAMUSCULAR; INTRAVENOUS at 03:27

## 2019-01-01 RX ADMIN — POTASSIUM CHLORIDE 10 MEQ: 7.46 INJECTION, SOLUTION INTRAVENOUS at 15:53

## 2019-01-01 RX ADMIN — AMLODIPINE BESYLATE 5 MG: 5 TABLET ORAL at 20:59

## 2019-01-01 RX ADMIN — TAMSULOSIN HYDROCHLORIDE 0.8 MG: 0.4 CAPSULE ORAL at 08:50

## 2019-01-01 RX ADMIN — MAGNESIUM CITRATE 296 ML: 1.75 LIQUID ORAL at 20:29

## 2019-01-01 RX ADMIN — MONTELUKAST 10 MG: 10 TABLET, FILM COATED ORAL at 20:59

## 2019-01-01 RX ADMIN — TAMSULOSIN HYDROCHLORIDE 0.8 MG: 0.4 CAPSULE ORAL at 21:12

## 2019-01-01 RX ADMIN — OXYCODONE HYDROCHLORIDE AND ACETAMINOPHEN 2 TABLET: 5; 325 TABLET ORAL at 14:58

## 2019-01-01 RX ADMIN — IOPAMIDOL 80 ML: 755 INJECTION, SOLUTION INTRAVENOUS at 12:15

## 2019-01-01 RX ADMIN — POLYETHYLENE GLYCOL 3350, SODIUM CHLORIDE, SODIUM BICARBONATE AND POTASSIUM CHLORIDE 4000 ML: KIT ORAL at 16:00

## 2019-01-01 RX ADMIN — METOPROLOL TARTRATE 100 MG: 50 TABLET, FILM COATED ORAL at 20:34

## 2019-01-01 RX ADMIN — SODIUM CHLORIDE: 9 INJECTION, SOLUTION INTRAVENOUS at 10:08

## 2019-01-01 RX ADMIN — Medication 10 ML: at 21:02

## 2019-01-01 RX ADMIN — Medication 10 ML: at 08:59

## 2019-01-01 RX ADMIN — PREGABALIN 50 MG: 50 CAPSULE ORAL at 09:08

## 2019-01-01 RX ADMIN — TAMSULOSIN HYDROCHLORIDE 0.8 MG: 0.4 CAPSULE ORAL at 09:44

## 2019-01-01 RX ADMIN — POTASSIUM CHLORIDE 10 MEQ: 7.46 INJECTION, SOLUTION INTRAVENOUS at 09:58

## 2019-01-01 RX ADMIN — PROPOFOL 100 MCG/KG/MIN: 10 INJECTION, EMULSION INTRAVENOUS at 13:15

## 2019-01-01 RX ADMIN — PROPOFOL 30 MG: 10 INJECTION, EMULSION INTRAVENOUS at 15:47

## 2019-01-01 RX ADMIN — TAMSULOSIN HYDROCHLORIDE 0.8 MG: 0.4 CAPSULE ORAL at 20:34

## 2019-01-01 RX ADMIN — AMLODIPINE BESYLATE 5 MG: 5 TABLET ORAL at 20:35

## 2019-01-01 RX ADMIN — METOPROLOL SUCCINATE 50 MG: 50 TABLET, EXTENDED RELEASE ORAL at 08:45

## 2019-01-01 RX ADMIN — SODIUM CHLORIDE: 9 INJECTION, SOLUTION INTRAVENOUS at 17:42

## 2019-01-01 RX ADMIN — BISACODYL 10 MG: 5 TABLET, COATED ORAL at 07:42

## 2019-01-01 RX ADMIN — CEFTRIAXONE SODIUM 1 G: 1 INJECTION, POWDER, FOR SOLUTION INTRAMUSCULAR; INTRAVENOUS at 03:50

## 2019-01-01 RX ADMIN — METOPROLOL SUCCINATE 50 MG: 50 TABLET, EXTENDED RELEASE ORAL at 09:34

## 2019-01-01 RX ADMIN — METOPROLOL TARTRATE 100 MG: 50 TABLET, FILM COATED ORAL at 20:58

## 2019-01-01 RX ADMIN — ENOXAPARIN SODIUM 40 MG: 40 INJECTION SUBCUTANEOUS at 18:26

## 2019-01-01 RX ADMIN — TAMSULOSIN HYDROCHLORIDE 0.8 MG: 0.4 CAPSULE ORAL at 20:59

## 2019-01-01 RX ADMIN — MONTELUKAST 10 MG: 10 TABLET, FILM COATED ORAL at 20:06

## 2019-01-01 RX ADMIN — HYDROCORTISONE ACETATE 25 MG: 25 SUPPOSITORY RECTAL at 14:48

## 2019-01-01 RX ADMIN — MONTELUKAST SODIUM 10 MG: 10 TABLET, FILM COATED ORAL at 21:59

## 2019-01-01 RX ADMIN — PROPOFOL 300 MG: 10 INJECTION, EMULSION INTRAVENOUS at 07:16

## 2019-01-01 RX ADMIN — TAMSULOSIN HYDROCHLORIDE 0.8 MG: 0.4 CAPSULE ORAL at 20:08

## 2019-01-01 RX ADMIN — PREGABALIN 50 MG: 50 CAPSULE ORAL at 21:59

## 2019-01-01 RX ADMIN — ENOXAPARIN SODIUM 40 MG: 40 INJECTION SUBCUTANEOUS at 09:08

## 2019-01-01 RX ADMIN — HYDROCORTISONE ACETATE 25 MG: 25 SUPPOSITORY RECTAL at 21:11

## 2019-01-01 RX ADMIN — METOPROLOL TARTRATE 100 MG: 50 TABLET, FILM COATED ORAL at 09:26

## 2019-01-01 RX ADMIN — HYDROCORTISONE ACETATE 25 MG: 25 SUPPOSITORY RECTAL at 09:08

## 2019-01-01 RX ADMIN — TAMSULOSIN HYDROCHLORIDE 0.8 MG: 0.4 CAPSULE ORAL at 09:08

## 2019-01-01 RX ADMIN — OXYCODONE AND ACETAMINOPHEN 1 TABLET: 10; 325 TABLET ORAL at 22:55

## 2019-01-01 RX ADMIN — MONTELUKAST SODIUM 10 MG: 10 TABLET, FILM COATED ORAL at 20:37

## 2019-01-01 RX ADMIN — OXYCODONE HYDROCHLORIDE AND ACETAMINOPHEN 2 TABLET: 5; 325 TABLET ORAL at 09:08

## 2019-01-01 RX ADMIN — SODIUM CHLORIDE, POTASSIUM CHLORIDE, SODIUM LACTATE AND CALCIUM CHLORIDE: 600; 310; 30; 20 INJECTION, SOLUTION INTRAVENOUS at 12:07

## 2019-01-01 RX ADMIN — SODIUM CHLORIDE: 9 INJECTION, SOLUTION INTRAVENOUS at 08:09

## 2019-01-01 RX ADMIN — HYDROMORPHONE HYDROCHLORIDE 0.5 MG: 1 INJECTION, SOLUTION INTRAMUSCULAR; INTRAVENOUS; SUBCUTANEOUS at 04:32

## 2019-01-01 RX ADMIN — AMLODIPINE BESYLATE 5 MG: 5 TABLET ORAL at 21:12

## 2019-01-01 RX ADMIN — AMLODIPINE BESYLATE 5 MG: 5 TABLET ORAL at 14:13

## 2019-01-01 RX ADMIN — AMLODIPINE BESYLATE 5 MG: 5 TABLET ORAL at 08:07

## 2019-01-01 RX ADMIN — METOPROLOL TARTRATE 100 MG: 50 TABLET, FILM COATED ORAL at 08:51

## 2019-01-01 RX ADMIN — OXYCODONE HYDROCHLORIDE AND ACETAMINOPHEN 2 TABLET: 5; 325 TABLET ORAL at 21:03

## 2019-01-01 RX ADMIN — OXYCODONE AND ACETAMINOPHEN 1 TABLET: 10; 325 TABLET ORAL at 15:53

## 2019-01-01 RX ADMIN — PREGABALIN 50 MG: 50 CAPSULE ORAL at 09:34

## 2019-01-01 RX ADMIN — OXYCODONE HYDROCHLORIDE AND ACETAMINOPHEN 2 TABLET: 5; 325 TABLET ORAL at 15:41

## 2019-01-01 RX ADMIN — ENOXAPARIN SODIUM 40 MG: 40 INJECTION SUBCUTANEOUS at 08:51

## 2019-01-01 RX ADMIN — POTASSIUM CHLORIDE 40 MEQ: 20 TABLET, EXTENDED RELEASE ORAL at 09:33

## 2019-01-01 RX ADMIN — AMIODARONE HYDROCHLORIDE 200 MG: 200 TABLET ORAL at 09:08

## 2019-01-01 RX ADMIN — OXYCODONE HYDROCHLORIDE AND ACETAMINOPHEN 2 TABLET: 5; 325 TABLET ORAL at 03:25

## 2019-01-01 RX ADMIN — PREGABALIN 50 MG: 50 CAPSULE ORAL at 21:29

## 2019-01-01 RX ADMIN — CALCIUM POLYCARBOPHIL 625 MG: 625 TABLET, FILM COATED ORAL at 14:48

## 2019-01-01 RX ADMIN — POTASSIUM CHLORIDE 10 MEQ: 10 INJECTION, SOLUTION INTRAVENOUS at 08:12

## 2019-01-01 RX ADMIN — POTASSIUM BICARBONATE 40 MEQ: 782 TABLET, EFFERVESCENT ORAL at 11:20

## 2019-01-01 RX ADMIN — AMLODIPINE BESYLATE 5 MG: 5 TABLET ORAL at 09:26

## 2019-01-01 RX ADMIN — SODIUM CHLORIDE: 9 INJECTION, SOLUTION INTRAVENOUS at 08:48

## 2019-01-01 RX ADMIN — PROPOFOL 50 MCG/KG/MIN: 10 INJECTION, EMULSION INTRAVENOUS at 15:47

## 2019-01-01 RX ADMIN — OXYCODONE HYDROCHLORIDE AND ACETAMINOPHEN 2 TABLET: 5; 325 TABLET ORAL at 06:43

## 2019-01-01 RX ADMIN — SODIUM CHLORIDE, POTASSIUM CHLORIDE, SODIUM LACTATE AND CALCIUM CHLORIDE: 600; 310; 30; 20 INJECTION, SOLUTION INTRAVENOUS at 13:11

## 2019-01-01 RX ADMIN — CEFTRIAXONE SODIUM 1 G: 1 INJECTION, POWDER, FOR SOLUTION INTRAMUSCULAR; INTRAVENOUS at 03:45

## 2019-01-01 RX ADMIN — PREGABALIN 50 MG: 50 CAPSULE ORAL at 12:22

## 2019-01-01 RX ADMIN — Medication 0.5 MG: at 04:32

## 2019-01-01 RX ADMIN — MONTELUKAST 10 MG: 10 TABLET, FILM COATED ORAL at 20:34

## 2019-01-01 RX ADMIN — OXYBUTYNIN CHLORIDE 10 MG: 5 TABLET, EXTENDED RELEASE ORAL at 22:34

## 2019-01-01 RX ADMIN — POLYETHYLENE GLYCOL 3350, SODIUM SULFATE ANHYDROUS, SODIUM BICARBONATE, SODIUM CHLORIDE, POTASSIUM CHLORIDE 4000 ML: 236; 22.74; 6.74; 5.86; 2.97 POWDER, FOR SOLUTION ORAL at 13:14

## 2019-01-01 RX ADMIN — AMLODIPINE BESYLATE 5 MG: 5 TABLET ORAL at 08:45

## 2019-01-01 RX ADMIN — TAMSULOSIN HYDROCHLORIDE 0.8 MG: 0.4 CAPSULE ORAL at 12:22

## 2019-01-01 RX ADMIN — METOPROLOL SUCCINATE 50 MG: 50 TABLET, EXTENDED RELEASE ORAL at 09:09

## 2019-01-01 RX ADMIN — AMLODIPINE BESYLATE 5 MG: 5 TABLET ORAL at 20:06

## 2019-01-01 RX ADMIN — PROPOFOL 20 MG: 10 INJECTION, EMULSION INTRAVENOUS at 15:51

## 2019-01-01 RX ADMIN — PREGABALIN 50 MG: 50 CAPSULE ORAL at 09:45

## 2019-01-01 RX ADMIN — MONTELUKAST 10 MG: 10 TABLET, FILM COATED ORAL at 21:12

## 2019-01-01 RX ADMIN — Medication 10 ML: at 02:53

## 2019-01-01 RX ADMIN — CALCIUM GLUCONATE 2 G: 98 INJECTION, SOLUTION INTRAVENOUS at 11:25

## 2019-01-01 RX ADMIN — POTASSIUM CHLORIDE 40 MEQ: 20 TABLET, EXTENDED RELEASE ORAL at 05:27

## 2019-01-01 RX ADMIN — SODIUM CHLORIDE: 9 INJECTION, SOLUTION INTRAVENOUS at 21:12

## 2019-01-01 RX ADMIN — PREGABALIN 50 MG: 50 CAPSULE ORAL at 08:45

## 2019-01-01 RX ADMIN — OXYCODONE AND ACETAMINOPHEN 1 TABLET: 10; 325 TABLET ORAL at 04:55

## 2019-01-01 RX ADMIN — POTASSIUM CHLORIDE 10 MEQ: 7.46 INJECTION, SOLUTION INTRAVENOUS at 11:22

## 2019-01-01 RX ADMIN — OXYCODONE HYDROCHLORIDE AND ACETAMINOPHEN 2 TABLET: 5; 325 TABLET ORAL at 12:07

## 2019-01-01 RX ADMIN — AMIODARONE HYDROCHLORIDE 200 MG: 200 TABLET ORAL at 08:51

## 2019-01-01 RX ADMIN — OXYCODONE HYDROCHLORIDE AND ACETAMINOPHEN 2 TABLET: 5; 325 TABLET ORAL at 10:55

## 2019-01-01 RX ADMIN — SODIUM CHLORIDE: 9 INJECTION, SOLUTION INTRAVENOUS at 09:08

## 2019-01-01 RX ADMIN — METOPROLOL TARTRATE 100 MG: 50 TABLET, FILM COATED ORAL at 09:08

## 2019-01-01 RX ADMIN — METOPROLOL TARTRATE 100 MG: 50 TABLET, FILM COATED ORAL at 08:06

## 2019-01-01 RX ADMIN — CEFTRIAXONE SODIUM 1 G: 1 INJECTION, POWDER, FOR SOLUTION INTRAMUSCULAR; INTRAVENOUS at 03:41

## 2019-01-01 RX ADMIN — POTASSIUM CHLORIDE 10 MEQ: 7.46 INJECTION, SOLUTION INTRAVENOUS at 12:48

## 2019-01-01 RX ADMIN — TAMSULOSIN HYDROCHLORIDE 0.8 MG: 0.4 CAPSULE ORAL at 09:34

## 2019-01-01 RX ADMIN — SODIUM CHLORIDE: 9 INJECTION, SOLUTION INTRAVENOUS at 11:25

## 2019-01-01 RX ADMIN — OXYCODONE HYDROCHLORIDE AND ACETAMINOPHEN 2 TABLET: 5; 325 TABLET ORAL at 09:34

## 2019-01-01 RX ADMIN — CALCIUM POLYCARBOPHIL 625 MG: 625 TABLET, FILM COATED ORAL at 09:08

## 2019-01-01 RX ADMIN — CEFTRIAXONE SODIUM 1 G: 1 INJECTION, POWDER, FOR SOLUTION INTRAMUSCULAR; INTRAVENOUS at 04:29

## 2019-01-01 RX ADMIN — BISACODYL 10 MG: 5 TABLET, COATED ORAL at 11:13

## 2019-01-01 RX ADMIN — METOPROLOL SUCCINATE 50 MG: 50 TABLET, EXTENDED RELEASE ORAL at 12:22

## 2019-01-01 RX ADMIN — PREGABALIN 50 MG: 50 CAPSULE ORAL at 21:03

## 2019-01-01 RX ADMIN — LIDOCAINE HYDROCHLORIDE 12 ML: 20 INJECTION, SOLUTION EPIDURAL; INFILTRATION; INTRACAUDAL; PERINEURAL at 12:10

## 2019-01-01 RX ADMIN — AMIODARONE HYDROCHLORIDE 200 MG: 200 TABLET ORAL at 11:18

## 2019-01-01 RX ADMIN — SODIUM CHLORIDE: 9 INJECTION, SOLUTION INTRAVENOUS at 11:21

## 2019-01-01 RX ADMIN — MAGNESIUM CITRATE 296 ML: 1.75 LIQUID ORAL at 00:34

## 2019-01-01 RX ADMIN — SODIUM CHLORIDE 160 MG/HR: 9 INJECTION, SOLUTION INTRAVENOUS at 20:44

## 2019-01-01 RX ADMIN — OXYCODONE HYDROCHLORIDE AND ACETAMINOPHEN 2 TABLET: 5; 325 TABLET ORAL at 23:31

## 2019-01-01 RX ADMIN — METOPROLOL TARTRATE 100 MG: 50 TABLET, FILM COATED ORAL at 21:12

## 2019-01-01 RX ADMIN — SODIUM CHLORIDE 1000 ML: 9 INJECTION, SOLUTION INTRAVENOUS at 03:51

## 2019-01-01 RX ADMIN — Medication 10 ML: at 03:45

## 2019-01-01 RX ADMIN — MONTELUKAST SODIUM 10 MG: 10 TABLET, FILM COATED ORAL at 21:29

## 2019-01-01 RX ADMIN — SODIUM CHLORIDE: 9 INJECTION, SOLUTION INTRAVENOUS at 20:58

## 2019-01-01 RX ADMIN — AMLODIPINE BESYLATE 5 MG: 5 TABLET ORAL at 08:51

## 2019-01-01 RX ADMIN — METOPROLOL TARTRATE 100 MG: 50 TABLET, FILM COATED ORAL at 20:05

## 2019-01-01 RX ADMIN — MONTELUKAST SODIUM 10 MG: 10 TABLET, FILM COATED ORAL at 21:03

## 2019-01-01 ASSESSMENT — PAIN DESCRIPTION - FREQUENCY
FREQUENCY: OTHER (COMMENT)
FREQUENCY: CONTINUOUS
FREQUENCY: INTERMITTENT
FREQUENCY: CONTINUOUS

## 2019-01-01 ASSESSMENT — PAIN DESCRIPTION - LOCATION
LOCATION: RECTUM
LOCATION: BACK
LOCATION: ABDOMEN
LOCATION: BACK
LOCATION: ABDOMEN
LOCATION: BACK

## 2019-01-01 ASSESSMENT — PAIN SCALES - GENERAL
PAINLEVEL_OUTOF10: 8
PAINLEVEL_OUTOF10: 7
PAINLEVEL_OUTOF10: 2
PAINLEVEL_OUTOF10: 0
PAINLEVEL_OUTOF10: 6
PAINLEVEL_OUTOF10: 0
PAINLEVEL_OUTOF10: 0
PAINLEVEL_OUTOF10: 4
PAINLEVEL_OUTOF10: 0
PAINLEVEL_OUTOF10: 8
PAINLEVEL_OUTOF10: 10
PAINLEVEL_OUTOF10: 9
PAINLEVEL_OUTOF10: 9
PAINLEVEL_OUTOF10: 8
PAINLEVEL_OUTOF10: 0
PAINLEVEL_OUTOF10: 0
PAINLEVEL_OUTOF10: 6
PAINLEVEL_OUTOF10: 0
PAINLEVEL_OUTOF10: 7
PAINLEVEL_OUTOF10: 4
PAINLEVEL_OUTOF10: 5
PAINLEVEL_OUTOF10: 0
PAINLEVEL_OUTOF10: 7
PAINLEVEL_OUTOF10: 8
PAINLEVEL_OUTOF10: 0
PAINLEVEL_OUTOF10: 8
PAINLEVEL_OUTOF10: 10
PAINLEVEL_OUTOF10: 0
PAINLEVEL_OUTOF10: 6
PAINLEVEL_OUTOF10: 0
PAINLEVEL_OUTOF10: 8
PAINLEVEL_OUTOF10: 0
PAINLEVEL_OUTOF10: 8
PAINLEVEL_OUTOF10: 0
PAINLEVEL_OUTOF10: 6
PAINLEVEL_OUTOF10: 10
PAINLEVEL_OUTOF10: 9
PAINLEVEL_OUTOF10: 0
PAINLEVEL_OUTOF10: 10
PAINLEVEL_OUTOF10: 7

## 2019-01-01 ASSESSMENT — PAIN DESCRIPTION - ONSET
ONSET: ON-GOING
ONSET: PROGRESSIVE
ONSET: GRADUAL
ONSET: ON-GOING

## 2019-01-01 ASSESSMENT — PAIN DESCRIPTION - DESCRIPTORS
DESCRIPTORS: ACHING;CONSTANT;DISCOMFORT
DESCRIPTORS: ACHING;DISCOMFORT
DESCRIPTORS: ACHING;CONSTANT;DISCOMFORT
DESCRIPTORS: ACHING;DISCOMFORT;NAGGING
DESCRIPTORS: ACHING;CRAMPING;DISCOMFORT
DESCRIPTORS: ACHING;DISCOMFORT
DESCRIPTORS: ACHING;CONSTANT;DISCOMFORT
DESCRIPTORS: PRESSURE
DESCRIPTORS: NAGGING
DESCRIPTORS: ACHING;CONSTANT;DISCOMFORT
DESCRIPTORS: PRESSURE
DESCRIPTORS: ACHING;CONSTANT;DISCOMFORT

## 2019-01-01 ASSESSMENT — PAIN - FUNCTIONAL ASSESSMENT
PAIN_FUNCTIONAL_ASSESSMENT: PREVENTS OR INTERFERES SOME ACTIVE ACTIVITIES AND ADLS
PAIN_FUNCTIONAL_ASSESSMENT: 0-10
PAIN_FUNCTIONAL_ASSESSMENT: ACTIVITIES ARE NOT PREVENTED
PAIN_FUNCTIONAL_ASSESSMENT: PREVENTS OR INTERFERES SOME ACTIVE ACTIVITIES AND ADLS
PAIN_FUNCTIONAL_ASSESSMENT: ACTIVITIES ARE NOT PREVENTED
PAIN_FUNCTIONAL_ASSESSMENT: PREVENTS OR INTERFERES SOME ACTIVE ACTIVITIES AND ADLS
PAIN_FUNCTIONAL_ASSESSMENT: ACTIVITIES ARE NOT PREVENTED
PAIN_FUNCTIONAL_ASSESSMENT: PREVENTS OR INTERFERES SOME ACTIVE ACTIVITIES AND ADLS

## 2019-01-01 ASSESSMENT — PAIN DESCRIPTION - PROGRESSION

## 2019-01-01 ASSESSMENT — PAIN DESCRIPTION - PAIN TYPE
TYPE: ACUTE PAIN
TYPE: CHRONIC PAIN
TYPE: ACUTE PAIN
TYPE: CHRONIC PAIN
TYPE: ACUTE PAIN
TYPE: CHRONIC PAIN

## 2019-01-01 ASSESSMENT — PAIN DESCRIPTION - ORIENTATION
ORIENTATION: LOWER
ORIENTATION: LOWER;UPPER
ORIENTATION: LOWER

## 2019-01-01 ASSESSMENT — ENCOUNTER SYMPTOMS
ABDOMINAL PAIN: 0
EYE REDNESS: 0
SHORTNESS OF BREATH: 0
NAUSEA: 0
VOMITING: 0

## 2019-03-05 ENCOUNTER — HOSPITAL ENCOUNTER (EMERGENCY)
Age: 74
Discharge: HOME | End: 2019-03-05
Payer: MEDICARE

## 2019-03-05 VITALS
HEIGHT: 70 IN | SYSTOLIC BLOOD PRESSURE: 139 MMHG | WEIGHT: 185 LBS | BODY MASS INDEX: 26.48 KG/M2 | OXYGEN SATURATION: 95 % | RESPIRATION RATE: 16 BRPM | HEART RATE: 88 BPM | TEMPERATURE: 97.3 F | DIASTOLIC BLOOD PRESSURE: 84 MMHG

## 2019-03-05 DIAGNOSIS — R33.9 URINARY RETENTION: Primary | ICD-10-CM

## 2019-03-05 LAB
BACTERIA: ABNORMAL /HPF
BILIRUBIN URINE: NEGATIVE
BLOOD, URINE: NEGATIVE
CLARITY: CLEAR
COLOR: ABNORMAL
GLUCOSE URINE: 100 MG/DL
KETONES, URINE: 15 MG/DL
LEUKOCYTE ESTERASE, URINE: NEGATIVE
NITRITE, URINE: POSITIVE
PH UA: 7 (ref 5–9)
PROTEIN UA: ABNORMAL MG/DL
RBC UA: ABNORMAL /HPF (ref 0–2)
SPECIFIC GRAVITY UA: 1.01 (ref 1–1.03)
UROBILINOGEN, URINE: 1 E.U./DL
WBC UA: ABNORMAL /HPF (ref 0–5)

## 2019-03-05 PROCEDURE — 87088 URINE BACTERIA CULTURE: CPT

## 2019-03-05 PROCEDURE — 81001 URINALYSIS AUTO W/SCOPE: CPT

## 2019-03-05 PROCEDURE — 99283 EMERGENCY DEPT VISIT LOW MDM: CPT

## 2019-03-05 ASSESSMENT — PAIN SCALES - GENERAL: PAINLEVEL_OUTOF10: 10

## 2019-03-08 LAB — URINE CULTURE, ROUTINE: NORMAL

## 2019-04-21 PROBLEM — K92.2 GI BLEED: Status: ACTIVE | Noted: 2019-01-01

## 2019-04-21 PROBLEM — I48.92 PAROXYSMAL ATRIAL FLUTTER (HCC): Status: ACTIVE | Noted: 2019-01-01

## 2019-04-21 NOTE — ED NOTES
Floor aware that SBAR was faxed and lactic will need redrawn after fluids     Leonel Macario RN  04/21/19 8452

## 2019-04-21 NOTE — PROGRESS NOTES
Lucas Jacinto was ordered Lavella Fermín which is a nonformulary medication. The patient will need to supply this medication from home, as the pharmacy does not carry it. If the medication has not been administered by 1400 on the following day from the time the order was placed, a pharmacist will follow-up with the nurse of the patient to assess the capability of the patient to bring in the medication. If it is determined that the patient cannot supply the medication and it is not available to be dispensed from the pharmacy, a call will be placed to the ordering provider to discuss alternative options. Thank you,  Read Homar.  Amadou Garcia, PharmD 4/21/2019 11:10 AM

## 2019-04-21 NOTE — CONSULTS
GENERAL SURGERY  CONSULT NOTE  4/21/2019    Physician Consulted: Dr. Harsha Light  Reason for Consult: Rectal bleeding  Referring Physician: Dr. Hodan Sousa    JEAN-PAUL Ochoa is a 76 y.o. male who presents for evaluation of rectal bleeding which started yesterday. He has a history of metastatic prostate CA and is currently on radiation therapy for which he has received 10 treatment in the last 2 weeks. He states his wife brought him in here because he was also dizzy. He is not on any blood thinners. His last C-scope was 2 years ago by Dr. See James and said they only found a polyp and recommended 5 year follow up. CT scan today showed concern for liver lesions. Patient denies lesions on prior scans. He also complained of low abdominal pain and was found to have a UTI. Past Medical History:   Diagnosis Date    Arthritis     Asthma     GIB (gastrointestinal bleeding)     Hypertension     Primary prostate cancer with metastasis from prostate to other site [facility])        Past Surgical History:   Procedure Laterality Date    CHOLECYSTECTOMY      SINUS SURGERY         Medications Prior to Admission:    Prior to Admission medications    Medication Sig Start Date End Date Taking? Authorizing Provider   oxyCODONE-acetaminophen (PERCOCET)  MG per tablet Take 1 tablet by mouth every 6 hours as needed for Pain.    Yes Historical Provider, MD   mometasone (ASMANEX 14 METERED DOSES) 220 MCG/INH inhaler Inhale 2 puffs into the lungs daily   Yes Historical Provider, MD   AMLODIPINE BESYLATE PO Take 7.5 mg by mouth daily   Yes Historical Provider, MD   hydrochlorothiazide (HYDRODIURIL) 25 MG tablet Take 25 mg by mouth daily   Yes Historical Provider, MD   montelukast (SINGULAIR) 10 MG tablet Take 10 mg by mouth nightly   Yes Historical Provider, MD   albuterol (PROVENTIL) (2.5 MG/3ML) 0.083% nebulizer solution Take 2.5 mg by nebulization every 6 hours as needed for Wheezing   Yes Historical Provider, MD   ibuprofen Signs and symptoms; Other: Rectal bleeding TECHNIQUE:  Imaging protocol: Axial computed tomography images of the abdomen and pelvis with intravenous contrast. Coronal and sagittal reformatted images were created and reviewed. Radiation optimization: All CT scans at this facility use at least one of these dose optimization techniques: automated exposure control; mA and/or kV adjustment per patient size (includes targeted exams where dose is matched to clinical indication); or iterative reconstruction. Contrast material: ISOVUE 370; Contrast volume: 110 ml; Contrast route: IV; COMPARISON:  No relevant prior studies available. FINDINGS:  Lungs: Centrilobular emphysema. There are 2 small pulmonary nodularity seen in the left lower lobe laterally measuring 2 mm and 10 mm. A 2 mm nodularity seen in the right lower lobe. Pleural space: Some strandy opacities are seen in the lung bases likely representing parenchymal or pleural scarring. ABDOMEN:  Liver: There are mildly inhomogeneous hypoattenuation lesions seen within the liver worrisome for metastatic lesions. A prominent mixed attenuation mass is seen on the caudal tip of the liver measuring 3.8 cm transverse dimension by 3.9 cm AP dimension by 4 cm craniocaudal dimension worrisome for a metastatic lesion as well. There are some scattered hypoattenuation lesions probably represent hepatic cysts, largest measuring 9 mm within the right hepatic lobe anteriorly. Gallbladder and bile ducts: Status post cholecystectomy. The common bile duct is mildly prominent measuring 10.8 mm in its midportion tapering caliber within the pancreas. Mild dilatation of the distal main pancreatic duct is seen measuring 3.4 mm. There are coarse calcification seen within the pancreas compatible with chronic pancreatitis. Pancreas: See Gallbladder And Bile Ducts Finding. Spleen: Normal. No splenomegaly. Adrenals: Normal. No mass. Kidneys and ureters:  There are bilateral simple appearing renal cysts, largest is seen on the right measuring 7.5 cm AP dimension. There is a small intermediate attenuation cystic lesion seen on the right kidney measuring 1.5 cm most probably representing a hemorrhagic cyst. There is a heterogeneous soft tissue attenuation mass seen within the upper left kidney measures 5.3 x 5.8 x 5 cm worrisome for a primary renal malignancy. Stomach and bowel: There is some bowel wall thickening seen within the sigmoid colon and rectum, findings could represent chronic colitis. Appendix: The appendix is visualized and is normal in configuration. PELVIS:  Bladder: A Milner catheter is present. There is bladder wall thickening possibly representing chronic cystitis. Some air is introduced into the bladder likely secondary to catheterization. Reproductive: The prostate gland is prominent measuring 4.6 x 5.6 x 5.2 cm. There is indistinctness of the fascial planes between the prostate gland, seminal vesicles and anterior wall of the rectum. There is asymmetric bowel wall thickening of the distal rectum seen on the right measuring up to 1.8 cm. These findings raise suspicion for an anorectal mass ABDOMEN and PELVIS:  Intraperitoneal space: Normal. No free air. No significant fluid collection. Bones/joints: There are sclerotic lesions seen within the pelvis bilaterally, the proximal left femur, sacrum and lumbar vertebral compatible with bony metastatic disease. Soft tissues: Unremarkable. Vasculature: Normal. No abdominal aortic aneurysm. Lymph nodes: There is a prominent nodularity seen within the right ischial rectal fossa most probably represent a prominent lymph node. Measures 13.3 mm in transverse dimension. There are prominent superficial inguinal lymph nodes seen on the right measuring 14.2 mm transverse dimension. 1. There are inhomogeneous hypoattenuation hepatic lesions worrisome for metastatic disease.  The largest is present in the distal tip of the liver measures up to 4

## 2019-04-21 NOTE — PROGRESS NOTES
Subjective: The patient is awake and alert. Dizziness is improved. Has abdominal discomfort and found to have UTI. No problems overnight. Denies chest pain, angina, dyspnea. No nausea or vomiting. Tolerating diet. Objective:    /82   Pulse 85   Temp 97.9 °F (36.6 °C) (Temporal)   Resp 16   Ht 5' 10\" (1.778 m)   Wt 175 lb (79.4 kg)   SpO2 97%   BMI 25.11 kg/m²     General: NAD  HEENT: No thrush or mucositis, EOMI  Heart:  RRR, no murmurs, gallops, or rubs.   Lungs:  CTA bilaterally, no wheeze, rales or rhonchi  Abd: bowel sounds present, nontender, nondistended, no masses  Extrem:  No clubbing, cyanosis, or edema  Lymphatics: No palpable adenopathy in cervical and supraclavicular regions  Skin: Intact, no petechia or purpura    CBC with Differential:    Lab Results   Component Value Date    WBC 7.2 04/21/2019    RBC 3.83 04/21/2019    HGB 10.7 04/21/2019    HCT 32.4 04/21/2019     04/21/2019    MCV 87.7 04/21/2019    MCH 29.0 04/21/2019    MCHC 33.0 04/21/2019    RDW 13.2 04/21/2019    LYMPHOPCT 3.5 04/21/2019    MONOPCT 7.8 04/21/2019    MYELOPCT 1.7 04/21/2019    BASOPCT 0.1 04/21/2019    MONOSABS 0.58 04/21/2019    LYMPHSABS 0.29 04/21/2019    EOSABS 0.19 04/21/2019    BASOSABS 0.00 04/21/2019     CMP:    Lab Results   Component Value Date     04/21/2019    K 3.4 04/21/2019    CL 97 04/21/2019    CO2 23 04/21/2019    BUN 21 04/21/2019    CREATININE 0.7 04/21/2019    GFRAA >60 04/21/2019    LABGLOM >60 04/21/2019    GLUCOSE 163 04/21/2019    PROT 5.8 04/21/2019    LABALBU 3.4 04/21/2019    CALCIUM 8.1 04/21/2019    BILITOT 0.4 04/21/2019    ALKPHOS 53 04/21/2019    AST 11 04/21/2019    ALT 8 04/21/2019      XIMA:[id number]}     Current Facility-Administered Medications:     oxyCODONE-acetaminophen (PERCOCET) 5-325 MG per tablet 2 tablet, 2 tablet, Oral, Q6H PRN, Mode Rasheed MD, 2 tablet at 04/21/19 0843    potassium chloride (KLOR-CON M) extended release tablet 40 mEq, 40 mEq, Oral, PRN, 40 mEq at 04/21/19 0845 **OR** potassium bicarb-citric acid (EFFER-K) effervescent tablet 40 mEq, 40 mEq, Oral, PRN **OR** potassium chloride 10 mEq/100 mL IVPB (Peripheral Line), 10 mEq, Intravenous, PRN, Michael Ambriz MD    0.9 % sodium chloride infusion, , Intravenous, Continuous, Michael Ambriz MD, Last Rate: 75 mL/hr at 04/21/19 0848    [START ON 4/22/2019] cefTRIAXone (ROCEPHIN) 1 g in sterile water 10 mL IV syringe, 1 g, Intravenous, Q24H, Michael Ambriz MD    montelukast (SINGULAIR) tablet 10 mg, 10 mg, Oral, Nightly, HAMLET Robins CNP    albuterol (PROVENTIL) nebulizer solution 2.5 mg, 2.5 mg, Nebulization, Q6H PRN, HAMLET Robins CNP    fluticasone (FLOVENT HFA) 110 MCG/ACT inhaler 2 puff, 2 puff, Inhalation, BID, HAMLET Robins CNP    sodium chloride flush 0.9 % injection 10 mL, 10 mL, Intravenous, 2 times per day, HAMLET Robins CNP    sodium chloride flush 0.9 % injection 10 mL, 10 mL, Intravenous, PRN, HAMLET Robins CNP    acetaminophen (TYLENOL) tablet 650 mg, 650 mg, Oral, Q4H PRN, HAMLET Robins CNP    ondansetron (ZOFRAN) injection 4 mg, 4 mg, Intravenous, Q6H PRN, HAMLET Robins CNP  [facility]  [START ON 4/22/2019] bisacodyl (DULCOLAX) EC tablet 10 mg, 10 mg, Oral, Q4H, Yolanda Payan MD    [START ON 4/22/2019] polyethylene glycol (GoLYTELY) solution 4,000 mL, 4,000 mL, Oral, Once, Yolanda Payan MD    tamsulosin [facility]) capsule 0.8 mg, 0.8 mg, Oral, Daily, HAMLET Robins CNP    pregabalin (LYRICA) capsule 50 mg, 50 mg, Oral, BID, HAMLET Robins CNP    metoprolol succinate (TOPROL XL) extended release tablet 50 mg, 50 mg, Oral, Daily, HAMLET Robins CNP    enzalutamide Samanta Gemma) capsule 160 mg, 160 mg, Oral, Daily, HAMLET Robins CNP    oxyCODONE-acetaminophen (PERCOCET)  MG per tablet 1 tablet, 1 tablet, Oral, Q6H PRN, HAMLET Robins CNP    Assessment:    Active Problems: GI bleed    Primary prostate cancer with metastasis from prostate to other site [facility])    Hypertension    GIB (gastrointestinal bleeding)    Asthma  Resolved Problems:    * No resolved hospital problems. *    77 yo gentleman known to Dr. Chayo Ayon and Dr. 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 Treated with Casodex and Lupron from May 2018. Stana Graces in June 2018. Increase in PSA and bone metastases. Switched Casodex to Francis in Feb 2019. Undergoing palliative XRT at [facility] to the back for severe pain. Admitted with dizziness and rectal bleeding, also found to have a UTI. Plan:  -CT abd/pelvis reveals increase in  the liver lesions now measuring 4 x 3.8 x 3.9 cm in the caudal tip. Previously known to have small hypodense benign lesions in the liver, largest in inferior R hepatic lobe measuring 1.7 cm, suspected hemangiomas and cysts. The soft tissue mass within the upper L kidney is stable from Jan CT imaging from [address], measuring 5.8 cm. Chronic cystitis noted. -IR guided liver lesion biopsy   -Colonoscopy planned for 4/23  -Monitor CBC w diff for now, does not require pRBC transfusion.        Electronically signed by Claudine Evans MD on 4/21/2019 at 11:49 AM

## 2019-04-21 NOTE — PROGRESS NOTES
IM notified of need for admission orders for patient. Pt also requesting pain medication be ordered ASAP. Awaiting new orders.

## 2019-04-21 NOTE — H&P
Pain 3/10/17 3/15/17  CRISTOBAL Frias       Allergies:  Iodine and Lisinopril    Social History:      The patient currently lives home    TOBACCO:   reports that he has never smoked. He has never used smokeless tobacco.  ETOH:   reports that he does not drink alcohol. Family History:      Reviewed in detail and negative for DM, CAD, Cancer, CVA. Positive as follows:    History reviewed. No pertinent family history. REVIEW OF SYSTEMS:   Pertinent positives as noted in the HPI. All other systems reviewed and negative. PHYSICAL EXAM:    BP (!) 154/88   Pulse 77   Temp 97.6 °F (36.4 °C) (Temporal)   Resp 16   Ht 5' 10\" (1.778 m)   Wt 175 lb (79.4 kg)   SpO2 93%   BMI 25.11 kg/m²     General appearance:  No apparent distress, appears stated age and cooperative. HEENT:  Normal cephalic, atraumatic without obvious deformity. Pupils equal, round, and reactive to light. Extra ocular muscles intact. Conjunctivae/corneas clear. Neck: Supple, with full range of motion. No jugular venous distention. Trachea midline. Respiratory:  Normal respiratory effort. Clear to auscultation, bilaterally without Rales/Wheezes/Rhonchi. Cardiovascular:  Regular rate and rhythm with normal S1/S2 without murmurs, rubs or gallops. Abdomen: Soft, non-tender, non-distended with normal bowel sounds. Musculoskeletal:  No clubbing, cyanosis or edema bilaterally. Full range of motion without deformity. Skin: Skin color, texture, turgor normal.  No rashes or lesions. Neurologic:  Neurovascularly intact without any focal sensory/motor deficits. Psychiatric:  Alert and oriented, thought content appropriate, normal insight  Capillary Refill: Brisk,< 3 seconds   Peripheral Pulses: +2 palpable, equal bilaterally     Ct Abdomen Pelvis W Iv Contrast Additional Contrast? None  Result Date: 4/21/2019  1. There are inhomogeneous hypoattenuation hepatic lesions worrisome for metastatic disease.  The largest is present in the distal tip of the liver measures up to 4 cm. 2. There is asymmetric bowel wall thickening of the distal rectum and anus on the right. A prominent lymph node seen within the right ischial rectal fossa and mildly prominent superficial inguinal lymph nodes are seen on the right, findings worrisome for a right anorectal malignancy. 3. The prostate gland is prominent and there is obliteration of fat planes between the prostate gland, seminal vesicles and rectum. This finding is worrisome for prostatic malignancy. 4. There is a prominent left renal mass present in the upper pole measuring 5.8 cm, findings could represent a primary renal malignancy. 5. Multiple osseous sclerotic metastatic lesions are seen. 6. There is diffuse bladder wall thickening possibly representing chronic cystitis. 7. Bilateral simple appearing renal cysts, largest measuring 7.5 cm in the upper pole of the right kidney. There is a smaller intermediate attenuation cystic mass seen in the right kidney that may represent hemorrhagic cyst although a solid renal nodularity cannot be entirely excluded as well. 8. Coarse calcifications within the pancreas compatible with chronic pancreatitis. 9. Small bilateral pulmonary nodularities each measuring approximately 2 mm. Fleischner follow up recommendations for incidental nodules are not indicated. Follow up per patient's medical condition. COMMENT:  Consistent with the [employer] of Radiology's Incidental Findings Committee Report (J Am Shamir Radiol 2010): Unless the patient's specific circumstances suggest otherwise, any liver lesion 0.5 cm or less, any cystic kidney lesion less than 1.0 cm, and/or any adrenal lesion 1.0 cm or less not otherwise characterized in this report as possessing suspicious or indeterminate imaging features is/are highly likely to be benign and do not require follow-up imaging or biopsy.      EKG:  Ordered and pending     Labs:     Recent Labs     04/21/19  0123 04/21/19  1114 WBC 7.2  --    HGB 11.1* 10.7*   HCT 33.6* 32.4*     --      Recent Labs     04/21/19  0123   *   K 3.4*   CL 97*   CO2 23   BUN 21   CREATININE 0.7   CALCIUM 8.1*     Recent Labs     04/21/19  0123   AST 11   ALT 8   BILITOT 0.4   ALKPHOS 53     Recent Labs     04/21/19  0123   INR 1.2     No results for input(s): [place] in the last 72 hours. Urinalysis:      Lab Results   Component Value Date    NITRU POSITIVE 04/21/2019    WBCUA >20 04/21/2019    BACTERIA MODERATE 04/21/2019    RBCUA 5-10 04/21/2019    BLOODU SMALL 04/21/2019    SPECGRAV 1.010 04/21/2019    GLUCOSEU Negative 04/21/2019     ASSESSMENT:    Active Hospital Problems    Diagnosis Date Noted    GI bleed [K92.2] 04/21/2019    Paroxysmal atrial flutter ([address] [I48.92] 04/21/2019    Primary prostate cancer with metastasis from prostate to other site [facility]) Ananya Golds     Hypertension [I10]     GIB (gastrointestinal bleeding) [K92.2]     Asthma [J45.909]      Rectal bleeding   Prostate cancer with metastasis to multiple sites   Possible metastasis to colon - see above CT   Mild hyponatremia   Elevated lactic acid   Mild hypokalemia   Mild Normocytic anemia- monitoring closely due to GI bleeding   Hematuria with UTI   HTN  asthma   PAFL- was previously on Xarelto and not currently on due to hx of bleeding and cancer     PLAN:    Admit   Surgery consult   Rocephin   Urine culture pending   Consult oncology   IVF   montior H/H; blood in stool  Clears; plan for scope on Tuesday per surgery       DVT Prophylaxis: SCDs   Diet: DIET CLEAR LIQUID;  Code Status: Full Code    PT/OT Eval Status: na     Dispo - admit        Santana Randall, APRN - CNP    Thank you Venancio Loyd MD for the opportunity to be involved in this patient's care.  If you have any questions or concerns please feel free to contact me at [phone]

## 2019-04-21 NOTE — PLAN OF CARE
Problem: Pain:  Description  Pain management should include both nonpharmacologic and pharmacologic interventions.   Goal: Pain level will decrease  Description  Pain level will decrease  Outcome: Met This Shift     Problem: Falls - Risk of:  Goal: Will remain free from falls  Description  Will remain free from falls  Outcome: Met This Shift     Problem: Falls - Risk of:  Goal: Absence of physical injury  Description  Absence of physical injury  Outcome: Met This Shift

## 2019-04-21 NOTE — PROGRESS NOTES
to focus on getting better and able to complete radiation treatments    Plan: Will notify Steven Osorio MD of change in care plan. Will look at further interventions as needed. Code Status: At this time patient wishes to be Full Code  Time Spent with Patient: 30 minutes    Electronically signed by HAMLET Johnson CNP on 4/21/2019 at 11:04 AM  Thank you Steven Osorio MD for the opportunity to be involved in this patient's care.

## 2019-04-21 NOTE — PROGRESS NOTES
Authorization for release of protected health information forms are filled out and ready to be faxed to [facility] to receive colonoscopy results and other records. Will await for tomorrow's business day.

## 2019-04-21 NOTE — ED PROVIDER NOTES
Department of Emergency Medicine   ED  Provider Note  Admit Date/RoomTime: 4/20/2019 11:54 PM  ED Room: 8407/8407-A      History of Present Illness:  4/21/19, Time: 12:48 AM         Federica Connell is a 76 y.o. male presenting to the ED for rectal bleeding, beginning two hours ago. The complaint has been intermittent, moderate in severity, and worsened by nothing. Pt reports that he had been eating red jello all day so his bowel movements had a tint of red to it all day. Around 10 PM this evening, pt noted that his bowel movement consisted of bright red blood. After pt stood up, he began to get lightheaded and dizzy. Pt is currently off of his Zarelto. Pt has prostate CA that spread to his spine so he is receiving radiation. No hx of hemorrhoids. Pt denies LOC, HA, SOB, CP, back pain, neck pain, n/v, fever, chills, dizziness, coughing, abdominal pain or any other general complaints. Review of Systems:   Pertinent positives and negatives are stated within HPI, all other systems reviewed and are negative.    --------------------------------------------- PAST HISTORY ---------------------------------------------  Past Medical History:  has a past medical history of Arthritis and Hypertension. Past Surgical History:  has a past surgical history that includes Cholecystectomy and sinus surgery. Social History:  reports that he has never smoked. He has never used smokeless tobacco. He reports that he does not drink alcohol or use drugs. Family History: family history is not on file. The patients home medications have been reviewed.     Allergies: Iodine and Lisinopril    ---------------------------------------------------PHYSICAL EXAM--------------------------------------    Constitutional/General: Alert and oriented x3, well appearing, non toxic in NAD  Head: Normocephalic and atraumatic  Eyes: PERRL, EOMI, conjunctiva normal, sclera non icteric  Mouth: Oropharynx clear  Neck: Supple  Respiratory: Lungs MPV 8.8 7.0 - 12.0 fL    Neutrophils % 84.3 (H) 43.0 - 80.0 %    Lymphocytes % 3.5 (L) 20.0 - 42.0 %    Monocytes % 7.8 2.0 - 12.0 %    Eosinophils % 2.6 0.0 - 6.0 %    Basophils % 0.1 0.0 - 2.0 %    Neutrophils # 6.19 1.80 - 7.30 E9/L    Lymphocytes # 0.29 (L) 1.50 - 4.00 E9/L    Monocytes # 0.58 0.10 - 0.95 E9/L    Eosinophils # 0.19 0.05 - 0.50 E9/L    Basophils # 0.00 0.00 - 0.20 E9/L    Myelocytes Relative 1.7 0 - 0 %    Poikilocytes 1+     María Cells 1+    Lipase   Result Value Ref Range    Lipase 32 13 - 60 U/L   Urinalysis   Result Value Ref Range    Color, UA Yellow Straw/Yellow    Clarity, UA CLOUDY (A) Clear    Glucose, Ur Negative Negative mg/dL    Bilirubin Urine Negative Negative    Ketones, Urine TRACE (A) Negative mg/dL    Specific Gravity, UA 1.010 1.005 - 1.030    Blood, Urine SMALL (A) Negative    pH, UA 6.0 5.0 - 9.0    Protein, UA 30 (A) Negative mg/dL    Urobilinogen, Urine 0.2 <2.0 E.U./dL    Nitrite, Urine POSITIVE (A) Negative    Leukocyte Esterase, Urine LARGE (A) Negative   Lactate, Sepsis   Result Value Ref Range    Lactic Acid, Sepsis 3.2 (H) 0.5 - 1.9 mmol/L   Protime-INR   Result Value Ref Range    Protime 13.3 (H) 9.3 - 12.4 sec    INR 1.2    Microscopic Urinalysis   Result Value Ref Range    Casts RARE /LPF    WBC, UA >20 0 - 5 /HPF    RBC, UA 5-10 (A) 0 - 2 /HPF    Epi Cells RARE /HPF    Bacteria, UA MODERATE (A) /HPF    Amorphous, UA FEW        RADIOLOGY:  Interpreted by Radiologist.  CT ABDOMEN PELVIS W IV CONTRAST Additional Contrast? None   Final Result   1. There are inhomogeneous hypoattenuation hepatic lesions worrisome for    metastatic disease. The largest is present in the distal tip of the liver    measures up to 4 cm. 2. There is asymmetric bowel wall thickening of the distal rectum and anus on    the right.  A prominent lymph node seen within the right ischial rectal fossa    and mildly prominent superficial inguinal lymph nodes are seen on the right,    findings worrisome for a right anorectal malignancy. 3. The prostate gland is prominent and there is obliteration of fat planes    between the prostate gland, seminal vesicles and rectum. This finding is    worrisome for prostatic malignancy. 4. There is a prominent left renal mass present in the upper pole measuring 5.8    cm, findings could represent a primary renal malignancy. 5. Multiple osseous sclerotic metastatic lesions are seen. 6. There is diffuse bladder wall thickening possibly representing chronic    cystitis. 7. Bilateral simple appearing renal cysts, largest measuring 7.5 cm in the    upper pole of the right kidney. There is a smaller intermediate attenuation    cystic mass seen in the right kidney that may represent hemorrhagic cyst    although a solid renal nodularity cannot be entirely excluded as well. 8. Coarse calcifications within the pancreas compatible with chronic    pancreatitis. 9. Small bilateral pulmonary nodularities each measuring approximately 2    mm. Fleischner follow up recommendations for incidental nodules are not    indicated. Follow up per patient's medical condition. COMMENT:     Consistent with the [employer] of Radiology's Incidental Findings    Committee Report (J Am Shamir Radiol 2010): Unless the patient's specific    circumstances suggest otherwise, any liver lesion 0.5 cm or less, any cystic    kidney lesion less than 1.0 cm, and/or any adrenal lesion 1.0 cm or less not    otherwise characterized in this report as possessing suspicious or    indeterminate imaging features is/are highly likely to be benign and do not    require follow-up imaging or biopsy. This report has been electronically signed by Shun Mckenna MD.        ------------------------- NURSING NOTES AND VITALS REVIEWED ---------------------------   The nursing notes within the ED encounter and vital signs as below have been reviewed by myself.   /82   Pulse 85   Temp 97.9 °F (36.6 °C) (Temporal)   Resp 16   Ht 5' 10\" (1.778 m)   Wt 175 lb (79.4 kg)   SpO2 97%   BMI 25.11 kg/m²   Oxygen Saturation Interpretation: Normal    The patients available past medical records and past encounters were reviewed. ------------------------------ ED COURSE/MEDICAL DECISION MAKING----------------------  Medications   cefTRIAXone (ROCEPHIN) 1 g in dextrose 5 % 50 mL IVPB (vial-mate) (0 g Intravenous Stopped 4/21/19 0422)   0.9 % sodium chloride bolus (0 mLs Intravenous Stopped 4/21/19 0642)   sodium chloride flush 0.9 % injection 10 mL (10 mLs Intravenous Given 4/21/19 0253)   iopamidol (ISOVUE-370) 76 % injection 110 mL (110 mLs Intravenous Given 4/21/19 0252)       Medical Decision Making: Lucas Jacinto presents for episodes of rectal bleeding at home. On exam, the patient does have like positive stool. Patient with mild anemia on CBC with a hemoglobin of 11.1. Patient does have an elevated lactate of 3.2 and mild hyponatremia with a sodium of 131. Urinalysis shows evidence of infection. A urine culture is pending. Patient given 1 g of IV Rocephin in the emergency department. CT abdomen and pelvis shows multiple areas of lesions and evidence of metastatic disease. Specifically, there is an asymmetric bowel wall thickening of the distal rectum and anus on the right worrisome for right anorectal malignancy and likely may be the source of the patient's rectal bleeding. We'll admit the patient for further evaluation of his GI bleed and ongoing treatment for his urinary tract infection secondary to an indwelling Milner catheter. This patient's ED course included: a personal history and physicial examination and re-evaluation prior to disposition    This patient has been closely monitored during their ED course. Re-Evaluations:           Re-evaluation. Patients symptoms show no change    Consultations:             Medicine, Dr. Martinez Rosenbaum     Counseling:    The emergency provider has spoken with the patient and spouse/SO and discussed todays results, in addition to providing specific details for the plan of care and counseling regarding the diagnosis and prognosis. Questions are answered at this time and they are agreeable with the plan.     --------------------------------- IMPRESSION AND DISPOSITION ---------------------------------    IMPRESSION  1. Urinary tract infection associated with indwelling urethral catheter, initial encounter ([facility]    2. Gastrointestinal hemorrhage, unspecified gastrointestinal hemorrhage type    3. Prostate cancer metastatic to multiple sites ([facility].)    4. Hyponatremia        DISPOSITION  Disposition: Admit to med/surg floor  Patient condition is stable    4/21/19, 12:48 AM.    This note is prepared by Rubén Hinkle, acting as Scribe for Jeny Mazariegos MD.    Jeny Mazariegos MD:  The scribe's documentation has been prepared under my direction and personally reviewed by me in its entirety. I confirm that the note above accurately reflects all work, treatment, procedures, and medical decision making performed by me.          Jeny Mazariegos MD  04/21/19 7613

## 2019-04-22 NOTE — PROGRESS NOTES
4/22/2019 9:15 AM   [facility]     Subjective:     Mary Scott is a 77 yo gentleman known to Dr. Olesya Hector and Dr. 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 Treated with Casodex and Lupron from May 2018. Octavia Johanna in June 2018. Increase in PSA and bone metastases. Switched Casodex to [place] in Feb 2019. Undergoing palliative XRT at [facility] to the back for severe pain.   Admitted with dizziness and rectal bleeding, also found to have a UTI      Some back pain this am   afebrile    Current meds:  oxyCODONE-acetaminophen (PERCOCET) 5-325 MG per tablet 2 tablet Q6H PRN   potassium chloride (KLOR-CON M) extended release tablet 40 mEq PRN   Or    potassium bicarb-citric acid (EFFER-K) effervescent tablet 40 mEq PRN   Or    potassium chloride 10 mEq/100 mL IVPB (Peripheral Line) PRN   0.9 % sodium chloride infusion Continuous   cefTRIAXone (ROCEPHIN) 1 g in sterile water 10 mL IV syringe Q24H   montelukast (SINGULAIR) tablet 10 mg Nightly   albuterol (PROVENTIL) nebulizer solution 2.5 mg Q6H PRN   fluticasone (FLOVENT HFA) 110 MCG/ACT inhaler 2 puff BID   sodium chloride flush 0.9 % injection 10 mL 2 times per day   sodium chloride flush 0.9 % injection 10 mL PRN   acetaminophen (TYLENOL) tablet 650 mg Q4H PRN   ondansetron (ZOFRAN) injection 4 mg Q6H PRN   polyethylene glycol (GoLYTELY) solution 4,000 mL Once   tamsulosin (FLOMAX) capsule 0.8 mg Daily   pregabalin (LYRICA) capsule 50 mg BID   metoprolol succinate (TOPROL XL) extended release tablet 50 mg Daily   enzalutamide (XTANDI) capsule 160 mg Daily   oxyCODONE-acetaminophen (PERCOCET)  MG per tablet 1 tablet Q6H PRN     Todays labs:  Recent Labs     04/21/19  0123  04/21/19  1827 04/22/19  0036 04/22/19  0750   WBC 7.2  --   --   --   --    HGB 11.1*   < > 12.5 10.9* 12.0*     --   --   --   --    BUN 21  --   --   --  16   CREATININE 0.7  --   --   --  0.6*    < > = values in this interval not displayed. Objective:     Patient Vitals for the past 8 hrs:   BP Temp Temp src Pulse Resp SpO2   04/22/19 0845 138/84 97.6 °F (36.4 °C) Temporal 84 16 97 %   04/22/19 0215 133/80 97 °F (36.1 °C) Temporal 74 16 96 %             Impression:     Active Problems:    GI bleed    Primary prostate cancer with metastasis from prostate to other site [facility])    Hypertension    GIB (gastrointestinal bleeding)    Asthma    Paroxysmal atrial flutter (HCC)  Resolved Problems:    * No resolved hospital problems.  *          Plan:     Plan is for IR liver biopsy  And colonscope

## 2019-04-22 NOTE — PROGRESS NOTES
GENERAL SURGERY  DAILY PROGRESS NOTE  4/22/2019    Subjective:  No events overnight. Objective:  /80   Pulse 74   Temp 97 °F (36.1 °C) (Temporal)   Resp 16   Ht 5' 10\" (1.778 m)   Wt 175 lb (79.4 kg)   SpO2 96%   BMI 25.11 kg/m²     General appearance: sleeping comfortably  Lungs: nonlabored breathing on room air  Heart: regular rate    Recent Labs     04/22/19  0036 04/21/19  1827 04/21/19  1114 04/21/19  0123   WBC  --   --   --  7.2   HGB 10.9* 12.5 10.7* 11.1*   HCT 33.2* 38.6 32.4* 33.6*   MCV  --   --   --  87.7   PLT  --   --   --  196     Heme/Onc note reviewed: Previously known to have small hypodense benign lesions in the liver, largest in inferior R hepatic lobe measuring 1.7 cm, suspected hemangiomas and cysts. The soft tissue mass within the upper L kidney is stable from Jan CT imaging from [facility], measuring 5.8 cm. Chronic cystitis noted. Assessment/Plan:  76 y.o. male with advanced prostate cancer with bony metastasis, on palliative radiation to the back [facility]), with rectal bleeding    Plan for colonoscopy tomorrow for evaluation of possible local invasion to rectum vs. Proctitis vs. Rectal mass  Last colonoscopy 2 years ago with Dr. Gomez Blizzard showed polyps--records requested  IR liver biopsy  Trend H/H--overall stable since admission    Electronically signed by Jennifer Hammer MD on 4/22/2019 at 6:28 AM     ATTENDING PHYSICIAN PROGRESS NOTE      I have examined the patient, reviewed the record, and discussed the case with the Resident. I have reviewed all relevant labs and imaging data. Please refer to the resident's note. I agree with the assessment and plan with the following corrections/ additions. The following summarizes my clinical findings and independent assessment.      Adina Nevarez

## 2019-04-22 NOTE — PRE SEDATION
Suzan Choudhary II, MD  4/22/2019  11:30 AM        PRE-SEDATION PHYSICIAN ASSESSMENT:      1. HISTORY & PHYSICAL EXAMINATION:  Comments: none    Vitals:    04/22/19 1105   BP: (!) 146/82   Pulse: 79   Resp: 16   Temp: 98 °F (36.7 °C)   SpO2: 96%       Allergies: Iodine and Lisinopril    2. Heart and Lungs immediately prior to procedure demonstrate no contraindications to proceed      Chief Complaint: <principal problem not specified>    Drug: unknown  Tobacco: unknown    3. PAST ANESTHESIA EXPERIENCE:  unknown. 4. AIRWAY/TEETH/HEAD & NECK(Mallampati Classification):  II (soft palate, uvula, fauces visible)    5: NORMAL RANGE OF MOTION OF NECK: No    6. PATIENT WILL LIKELY TOLERATE PLAN OF MODERATE SEDATION    7. ASA 2.     Cammie Arriola MD

## 2019-04-22 NOTE — INTERVAL H&P NOTE
H&P Update    Patient's History and Physical  was reviewed. The patient appears likely to able to tolerate the procedure. Risk and benefits discussed including ultimate complications, possibly death and consent obtained.     Petty Doshi, II

## 2019-04-22 NOTE — PROGRESS NOTES
Patient made NPO for IR procedure. Per IR nurse, they are sending for patient now. Will give Golytely when patient returns.

## 2019-04-22 NOTE — PROGRESS NOTES
1050 - Patient arrived via cart to Radiology department for liver biopsy. Allergies, medications, H&P and fasting instructions reviewed with patient. Vital signs taken. Procedural instructions given, questions answered, understanding expressed and consent signed. 1125 - Dr Blayne Morris came and talked to patient. 1130 - Patient positioned head first supine on Cat Scan table with O2 and monitoring devices attached (Afib). 1143 - Patient scanned and images reviewed by Dr Blayne Morris. 1159 - Patient prepped and draped. 1209 - Timeout done. 1213 - With the guidance of Cat Scan, needle inserted RUQ and core biopsy x2 taken by Dr Blayne Morris. 1214 - Patient re-scanned and images reviewed by Dr Blayne Morris. 1216 - Puncture site cleansed and dry dressing applied. Procedure completed. Patient transported to recovery. Biopsy sample taken to laboratory. Transport requested. Gertrudis Simpson called back for report.

## 2019-04-22 NOTE — PROGRESS NOTES
Hospitalist Progress Note      Synopsis: Patient admitted on 4/20/2019 past medical history of prostate cancer metastasis, atrial flutter previously on Xarelto and hypertension presents for rectal bleeding. Subjective    Patient seen and examined. Patient is scheduled for IR liver biopsy and possible endoscopy per general surgery  Patient has no current complaints. He reports no chest pain shortness of breath lightheadedness. No bowel movements. Exam:  BP (!) 146/82   Pulse 79   Temp 98 °F (36.7 °C) (Oral)   Resp 16   Ht 5' 10\" (1.778 m)   Wt 175 lb (79.4 kg)   SpO2 96%   BMI 25.11 kg/m²   General appearance: No apparent distress, appears stated age and cooperative. HEENT: Pupils equal, round, and reactive to light. Conjunctivae/corneas clear. Neck: Supple. No jugular venous distention. Trachea midline. Respiratory:  Normal respiratory effort. Clear to auscultation, bilaterally without Rales/Wheezes/Rhonchi. Cardiovascular: Regular rate and rhythm with normal S1/S2 without murmurs, rubs or gallops. Abdomen: Soft, non-tender, non-distended with normal bowel sounds. Musculoskeletal: No clubbing, cyanosis or edema bilaterally. Brisk capillary refill. 2+ lower extremity pulses (dorsalis pedis).    Skin:  No rashes    Neurologic: awake, alert and following commands     Medications:  Reviewed    Infusion Medications    sodium chloride 75 mL/hr at 04/21/19 0848     Scheduled Medications    cefTRIAXone (ROCEPHIN) IV  1 g Intravenous Q24H    montelukast  10 mg Oral Nightly    fluticasone  2 puff Inhalation BID    sodium chloride flush  10 mL Intravenous 2 times per day    polyethylene glycol  4,000 mL Oral Once    tamsulosin  0.8 mg Oral Daily    pregabalin  50 mg Oral BID    metoprolol succinate  50 mg Oral Daily    enzalutamide  160 mg Oral Daily     PRN Meds: oxyCODONE-acetaminophen, potassium chloride **OR** potassium alternative oral replacement **OR** potassium chloride, albuterol, sodium chloride flush, acetaminophen, ondansetron, oxyCODONE-acetaminophen    I/O    Intake/Output Summary (Last 24 hours) at 4/22/2019 1127  Last data filed at 4/21/2019 1919  Gross per 24 hour   Intake 826 ml   Output 1575 ml   Net -749 ml       Labs:   Recent Labs     04/21/19  0123  04/21/19  1827 04/22/19  0036 04/22/19  0750   WBC 7.2  --   --   --   --    HGB 11.1*   < > 12.5 10.9* 12.0*   HCT 33.6*   < > 38.6 33.2* 36.7*     --   --   --   --     < > = values in this interval not displayed. Recent Labs     04/21/19  0123 04/22/19  0750   * 139   K 3.4* 3.5   CL 97* 104   CO2 23 21*   BUN 21 16   CREATININE 0.7 0.6*   CALCIUM 8.1* 7.2*       Recent Labs     04/21/19  0123 04/22/19  0750   PROT 5.8* 5.9*   ALKPHOS 53 51   ALT 8 9   AST 11 13   BILITOT 0.4 0.5   LIPASE 32  --        Recent Labs     04/21/19  0123   INR 1.2       No results for input(s): Tana Puga in the last 72 hours. Chronic labs:  Lab Results   Component Value Date    INR 1.2 04/21/2019       Radiology:  Imaging studies reviewed today. ASSESSMENT:    Active Problems:    GI bleed    Primary prostate cancer with metastasis from prostate to other site [facility])    Hypertension    GIB (gastrointestinal bleeding)    Asthma    Paroxysmal atrial flutter (HCC)  Resolved Problems:    * No resolved hospital problems. *       PLAN:    IR biopsy of liver  Discussed with oncology yesterday  Monitor hemoglobin and hematocrit. Colonoscopy tomorrow. Diet: Diet NPO Effective Now Exceptions are: Sips with Meds  Code Status: Full Code    +++++++++++++++++++++++++++++++++++++++++++++++++  Alma Kovacs MD   [facility].  +++++++++++++++++++++++++++++++++++++++++++++++++  NOTE: This report was transcribed using voice recognition software. Every effort was made to ensure accuracy; however, inadvertent computerized transcription errors may be present.

## 2019-04-22 NOTE — POST SEDATION
POST SEDATION NOTE:  Time: 11:30 AM    Cardiopulmonary: Vitals Signs Stable: yes    Level of Consciousness: alert    Reversal Agent Used: No    Complications: none    Follow-up/Observations: none    Pain Score: 1    Hakan Serrano MD

## 2019-04-23 PROBLEM — K76.9 HEPATIC LESION: Status: ACTIVE | Noted: 2019-01-01

## 2019-04-23 PROBLEM — E87.6 HYPOKALEMIA: Status: ACTIVE | Noted: 2019-01-01

## 2019-04-23 PROBLEM — E44.0 MODERATE PROTEIN-CALORIE MALNUTRITION (HCC): Status: ACTIVE | Noted: 2019-01-01

## 2019-04-23 NOTE — CARE COORDINATION
Met with patient and wife in room to explain role and discuss transition of care. Discussed therapy eval and the need for rehab prior to returning home. Patient agreeable, says he feels weak. I provided a list of nursing facilities. Will follow with patient and wife for a choice.   Shannon Romberg RN CM

## 2019-04-23 NOTE — PROGRESS NOTES
4/23/2019 9:41 AM   [facility]     Subjective:     Nehal Barber is a 75 yo gentleman known to Dr. Isaiah Traore and Dr. 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 Treated with Casodex and Lupron from May 2018. Herlinda Sin in June 2018. Increase in PSA and bone metastases. Switched Casodex to [place] in Feb 2019. Undergoing palliative XRT at [facility] to the back for severe pain.   Admitted with dizziness and rectal bleeding, also found to have a UTI      In bed this am   O2 in place   no sig pain   afebrile    Current meds:    oxyCODONE-acetaminophen (PERCOCET) 5-325 MG per tablet 2 tablet Q6H PRN   potassium chloride (KLOR-CON M) extended release tablet 40 mEq PRN   Or    potassium bicarb-citric acid (EFFER-K) effervescent tablet 40 mEq PRN   Or    potassium chloride 10 mEq/100 mL IVPB (Peripheral Line) PRN   0.9 % sodium chloride infusion Continuous   cefTRIAXone (ROCEPHIN) 1 g in sterile water 10 mL IV syringe Q24H   montelukast (SINGULAIR) tablet 10 mg Nightly   albuterol (PROVENTIL) nebulizer solution 2.5 mg Q6H PRN   fluticasone (FLOVENT HFA) 110 MCG/ACT inhaler 2 puff BID   sodium chloride flush 0.9 % injection 10 mL 2 times per day   sodium chloride flush 0.9 % injection 10 mL PRN   acetaminophen (TYLENOL) tablet 650 mg Q4H PRN   ondansetron (ZOFRAN) injection 4 mg Q6H PRN   tamsulosin (FLOMAX) capsule 0.8 mg Daily   pregabalin (LYRICA) capsule 50 mg BID   metoprolol succinate (TOPROL XL) extended release tablet 50 mg Daily   enzalutamide (XTANDI) capsule 160 mg Daily   oxyCODONE-acetaminophen (PERCOCET)  MG per tablet 1 tablet Q6H PRN     Todays labs:    Recent Labs     04/21/19  0123  04/22/19  0750 04/22/19  1622 04/23/19  0227 04/23/19  0514   WBC 7.2  --   --   --   --   --    HGB 11.1*   < > 12.0* 11.8* 11.8* 10.9*     --   --   --   --   --    BUN 21  --  16  --   --  13   CREATININE 0.7  --  0.6*  --   --  0.6*    < > = values in this interval not displayed. Objective:     Patient Vitals for the past 8 hrs:   BP Temp Temp src Pulse Resp SpO2   04/23/19 0800 (!) 144/83 98.1 °F (36.7 °C) Temporal 89 16 95 %   04/23/19 0516 133/76 97.3 °F (36.3 °C) Temporal 87 16 --             Impression:     Active Problems:    GI bleed    Primary prostate cancer with metastasis from prostate to other site [facility])    Hypertension    GIB (gastrointestinal bleeding)    Asthma    Paroxysmal atrial flutter (HCC)    Hepatic lesion    Hypokalemia  Resolved Problems:    * No resolved hospital problems.  *          Plan:     Await path from liver biopsy   for colonscope  And colonscope

## 2019-04-23 NOTE — PROGRESS NOTES
Follow-up call done to [facility], states the patient is doing well. Site is clean, dry, and no drainage from site. Patient has general pain but no pain specifically from site. Call ordering Dr with any questions or concerns.

## 2019-04-23 NOTE — PROGRESS NOTES
[facility]  Physical Therapy Initial Evaluation    Name: Mary Scott  : 1945  MRN: [id number]    Date of Service: 2019        Evaluating Therapist: Mary Doty PT, DPT       Equipment Recommendations: to be determined. Room #: 6278/5363-M  DIAGNOSIS: GI bleed  PRECAUTIONS: CA with METs, fall risk     Social:  Pt lives with Joel in a 1 floor plan 4 steps and 1 rails to enter. Prior to admission pt walked with w/w     Initial Evaluation  Date:  Treatment      Short Term/ Long Term   Goals   Was pt agreeable to Eval/treatment? yes     Does pt have pain? 7-8/10 LBP     Bed Mobility  Rolling: NT  sidelying to sit: mod A   Sit to sidelying: min A  Scooting: NT  SBA   Transfers Sit to stand: min A x 2  Stand to sit: min A x 2  Stand pivot: NT  CGA   Ambulation    NT  15+ feet with w/w with min A    Stair negotiation: ascended and descended NT  NT   AM-PAC Raw Score        Pt is alert and Oriented x 3  ROM:  L LE grossly WFL  R LE grossly WFL  Strength:   L LE grossly 3/5  R LE grossly 3/5  Balance: standing min A x 2 with w/w   Sensation: pt reports numbness L UE from IV  Endurance: poor       ASSESSMENT  Pt displays functional ability as noted in the objective portion of this evaluation. Comments/Treatment:  Pt found laying in bed agreeable to evaluation. Pt instructed in mobility. Pt limited in standing by dizziness and c/o knees feeling weak. Pt only stands briefly before needing to sit back down. Pt left laying in bed with call button in reach and visitor present. Patient education  Pt educated on mobility. Patient response to education:   Pt verbalized understanding Pt demonstrated skill Pt requires further education in this area   x Partial With assist x     Rehab potential is Good for reaching above PT goals. Pts/ family goals   1. None stated    Patient and or family understand(s) diagnosis, prognosis, and plan of care.     PLAN  PT care will be provided in accordance with the objectives noted above. Whenever appropriate, clear delegation orders will be provided for nursing staff. Exercises and functional mobility practice will be used as well as appropriate assistive devices or modalities to obtain goals. Patient and family education will also be administered as needed. Frequency of treatments will be 2-5x/week x 4-6 days.     Time in: 1330  Time out: 1345  Evaluation + 8  minutes tx time    Marianne Alt PT, DPT   License number:  [id number]

## 2019-04-23 NOTE — CARE COORDINATION
Followed up with patient and wife for SWETHA choice. Their 3 choices are: 1. [facility]  2. [address]  3. [facility]. Referral made to Nuha Sinclair at [facility], await acceptance.   Shannon Romberg RN CM

## 2019-04-23 NOTE — PROGRESS NOTES
OCCUPATIONAL THERAPY INITIAL EVALUATION      Date:2019  Patient Name: Oliver Chris  MRN: [id number]  : 1945  Room: [address] [place], [place] [address]      [address], OTR/L #[id number]    AM-PAC Daily Activity Raw Score:   Recommended Adaptive Equipment: TBD     Diagnosis: GI bleed [K92.2]       Pertinent Medical History: arthritis, asthma, aflutter, GIB, HTN    Precautions:  Falls, prostate CA w/ bone mets     Home Living: Pt lives with spouse in 1 floor home. 4 KIKE, 1 handrail   Bathroom setup: tub/shower   Equipment owned: ww    Prior Level of Function: assistance with ADLs , assistance with IADLs; ambulated with ww  Driving: no    Pain Level: Pt c/o 7-8/10 low back pain this session; reinforced pain management strategies    Cognition: A&O: 3/4; Follows basic step directions   Memory:  fair +   Sequencing:  fair    Problem solving:  fair -   Judgement/safety:  fair -     Functional Assessment:   Initial Eval Status  Date: 19 Treatment Status  Date: Short Term Goals  Treatment frequency: PRN    Feeding NPO  Independent    Grooming Minimal Assist   Supervision    UB Dressing Moderate Assist   Stand by Assist    LB Dressing Dependent   Moderate Assist    Bathing Maximal Assist  Moderate Assist    Toileting Dependent   Chronic aynez  Moderate Assist    Bed Mobility  sidelying to sit: Moderate Assist   Sit to sidelying: Minimal Assist   Supine to sit: Stand by Assist   Sit to supine: Stand by Assist    Functional Transfers Min A x2  SBA   Functional Mobility NT  Min A   Balance Sitting: SBA (static.  Limited sit tolerance d/t pain)  Standing: Min A x2 w/ ww (limited tolerance d/t c/o \"knees feel weak\" and dizziness     Activity Tolerance Poor  Limited by pain  Fair   Visual/  Perceptual Glasses: readers                Hand dominance: R   Strength ROM Additional Info:    RUE  Deferred formal MMT d/t bone mets WFL good  and wfl FMC/dexterity noted during ADL tasks       LUE Deferred formal MMT d/t bone mets WFL good  and wfl FMC/dexterity noted during ADL tasks       Hearing: WFL   Sensation: c/o numbness/tingling  L UE (d/t IV)  Tone: WFL   Edema: none noted                            Comments/Ember tment: Upon arrival, patient lying in bed (sidelying for comfort). Therapist facilitated bed mobility (sidelying to/from sit. Education/cues for pain management, body mechanics), sitting balance/tolerance training, functional transfers (education/cues for safety/hand placement, pain management) and  standing tolerance tasks (addressing posture, balance and activity tolerance w/ ww. Limited tolerance d/t LE weakness and c/o dizziness). Therapist facilitated self-care retraining: UB/LB self-care tasks (gown, socks) and simulated grooming task while educating pt on modified techniques, posture, safety and energy conservation techniques. Skilled monitoring of HR, O2 sats and pts response to treatment. At end of session, patient lying in bed (sidelying for comfort. Repositioned for comfort. Spouse present) with call light and phone within reach, all lines and tubes intact. Pt would benefit from continued skilled OT to increase functional independence and quality of life. mod  Profile and History- med (extensive chart review)  Assessment of Occupational Performance and Identification of Deficits- med  Clinical Decision Making- med    Evaluation time includes thorough review of current medical information, gathering information on past medical history/social history and prior level of function, completion of standardized testing/informal observation of tasks, assessment of data, and development of POC/Goals.       Assessment of current deficits  Functional mobility [x]  ADLs [x] Strength [x]  Cognition []  Functional transfers  [x] IADLs [] Safety Awareness [x]  Endurance [x]  Fine Motor Coordination [] Balance [x] Vision/perception [] Sensation []   Gross Motor Coordination [] ROM [] Delirium [] Motor Control []    Plan of Care:   ADL retraining [x]   Equipment needs [x]   Neuromuscular re-education [x] Energy Conservation Techniques [x]  Functional Transfer training [x] Patient and/or Family Education [x]  Functional Mobility training [x]  Environmental Modifications [x]  Cognitive re-training []   Compensatory techniques for ADLs [x]  Splinting Needs []   Positioning to improve overall function [x]   Therapeutic Activity [x]  Therapeutic Exercise  [x]  Visual/Perceptual: []    Delirium prevention/treatment  []   Other:  []    Rehab Potential: Good for established goals    Patient / Family Goal: Not stated     Patient and/or family were instructed diagnosis, prognosis/goals and plan of care. Demonstrated fair+ understanding. [] Malnutrition indicators have been identified and nursing has been notified to ensure a dietitian consult is ordered.        Mod Evaluation completed +  Timed Treatment: 9 minutes  Tx Time in: 13:36  TxTime out: 13:45        Tona Jones, OTR/L #[id number]

## 2019-04-23 NOTE — ANESTHESIA PRE PROCEDURE
Department of Anesthesiology  Preprocedure Note       Name:  Nathen Daniels   Age:  76 y.o.  :  1945                                          MRN:  [id number]         Date:  2019      Surgeon: Timbo Coleman):  Shakeel Morley MD    Procedure: COLONOSCOPY DIAGNOSTIC (N/A )    Medications prior to admission:   Prior to Admission medications    Medication Sig Start Date End Date Taking? Authorizing Provider   oxyCODONE-acetaminophen (PERCOCET)  MG per tablet Take 1 tablet by mouth every 6 hours as needed for Pain. Yes Historical Provider, MD   metoprolol succinate (TOPROL XL) 50 MG extended release tablet Take 50 mg by mouth daily   Yes Historical Provider, MD   tamsulosin (FLOMAX) 0.4 MG capsule Take 0.8 mg by mouth daily   Yes Historical Provider, MD   pregabalin (LYRICA) 50 MG capsule Take 50 mg by mouth 2 times daily.    Yes Historical Provider, MD   enzalutamide (XTANDI) 40 MG capsule Take 160 mg by mouth daily   Yes Historical Provider, MD   potassium chloride (KLOR-CON M) 20 MEQ TBCR extended release tablet Take 20 mEq by mouth every other day   Yes Historical Provider, MD   meloxicam (MOBIC) 7.5 MG tablet Take 7.5 mg by mouth daily   Yes Historical Provider, MD   mometasone (ASMANEX 14 METERED DOSES) 220 MCG/INH inhaler Inhale 2 puffs into the lungs daily   Yes Historical Provider, MD   AMLODIPINE BESYLATE PO Take 5 mg by mouth daily    Yes Historical Provider, MD   hydrochlorothiazide (HYDRODIURIL) 25 MG tablet Take 25 mg by mouth daily   Yes Historical Provider, MD   montelukast (SINGULAIR) 10 MG tablet Take 10 mg by mouth nightly   Yes Historical Provider, MD   albuterol (PROVENTIL) (2.5 MG/3ML) 0.083% nebulizer solution Take 2.5 mg by nebulization every 6 hours as needed for Wheezing   Yes Historical Provider, MD   ibuprofen (ADVIL;MOTRIN) 800 MG tablet Take 1 tablet by mouth every 6 hours as needed for Pain 3/10/17 3/15/17  CRISTOBAL Frias       Current medications:    Current Facility-Administered Medications   Medication Dose Route Frequency Provider Last Rate Last Dose    oxyCODONE-acetaminophen (PERCOCET) 5-325 MG per tablet 2 tablet  2 tablet Oral Q6H PRN Camille Darnell MD   2 tablet at 04/22/19 2159    potassium chloride (KLOR-CON M) extended release tablet 40 mEq  40 mEq Oral PRN Camille Darnell MD   40 mEq at 04/22/19 0933    Or    potassium bicarb-citric acid (EFFER-K) effervescent tablet 40 mEq  40 mEq Oral PRN Camille Darnell MD        Or    potassium chloride 10 mEq/100 mL IVPB (Peripheral Line)  10 mEq Intravenous PRN Camille Darnell MD        0.9 % sodium chloride infusion   Intravenous Continuous Camille Darnell MD 75 mL/hr at 04/21/19 0848      cefTRIAXone (ROCEPHIN) 1 g in sterile water 10 mL IV syringe  1 g Intravenous Q24H Camille Darnell MD   1 g at 04/23/19 0345    montelukast (SINGULAIR) tablet 10 mg  10 mg Oral Nightly Lucy Flores, APRN - CNP   10 mg at 04/22/19 2159    albuterol (PROVENTIL) nebulizer solution 2.5 mg  2.5 mg Nebulization Q6H PRN Lucy Flores, APRN - CNP        fluticasone (FLOVENT HFA) 110 MCG/ACT inhaler 2 puff  2 puff Inhalation BID Lucy Flores, APRN - CNP        sodium chloride flush 0.9 % injection 10 mL  10 mL Intravenous 2 times per day Lucy Flores, APRN - CNP        sodium chloride flush 0.9 % injection 10 mL  10 mL Intravenous PRN Lucy Flores, APRN - CNP   10 mL at 04/23/19 0345    acetaminophen (TYLENOL) tablet 650 mg  650 mg Oral Q4H PRN Lucy Flores, APRN - CNP        ondansetron (ZOFRAN) injection 4 mg  4 mg Intravenous Q6H PRN Lucy Flores, APRN - CNP        tamsulosin (FLOMAX) capsule 0.8 mg  0.8 mg Oral Daily Lucy Flores, APRN - CNP   0.8 mg at 04/22/19 0934    pregabalin (LYRICA) capsule 50 mg  50 mg Oral BID Lucy Flores, APRN - CNP   50 mg at 04/22/19 2159    metoprolol succinate (TOPROL XL) extended release tablet 50 mg  50 mg Oral Daily HAMLET Bhatia CNP   50 mg at 04/22/19 0990    enzalutamide (XTANDI) capsule 160 mg  160 mg Oral Daily HAMLET Sanabria CNP   160 mg at 04/22/19 1820    oxyCODONE-acetaminophen (PERCOCET)  MG per tablet 1 tablet  1 tablet Oral Q6H PRN HAMLET Sanabria CNP           Allergies: Allergies   Allergen Reactions    Iodine      Patient states he has no allergy to this    Lisinopril        Problem List:    Patient Active Problem List   Diagnosis Code    Dog bite of right thigh V92.443C, W54. 0XXA    GI bleed K92.2    Primary prostate cancer with metastasis from prostate to other site [facility]) C61    Hypertension I10    GIB (gastrointestinal bleeding) K92.2    Asthma J45.909    Paroxysmal atrial flutter (HCC) I48.92    Hepatic lesion K76.9    Hypokalemia E87.6       Past Medical History:        Diagnosis Date    Arthritis     Asthma     Atrial flutter, paroxysmal (HCC)     GIB (gastrointestinal bleeding)     Hypertension     Paroxysmal atrial flutter ([address] 4/21/2019    Primary prostate cancer with metastasis from prostate to other site [facility]) 03/2018    already had metastasized.         Past Surgical History:        Procedure Laterality Date    CHOLECYSTECTOMY      SINUS SURGERY         Social History:    Social History     Tobacco Use    Smoking status: Never Smoker    Smokeless tobacco: Never Used   Substance Use Topics    Alcohol use: No     Comment: rare                                Counseling given: Not Answered      Vital Signs (Current):   Vitals:    04/22/19 2159 04/22/19 2345 04/23/19 0516 04/23/19 0800   BP: (!) 162/85 (!) 142/76 133/76 (!) 144/83   Pulse: 93 78 87 89   Resp: 17 16 16 16   Temp: 98.2 °F (36.8 °C) 98.4 °F (36.9 °C) 97.3 °F (36.3 °C) 98.1 °F (36.7 °C)   TempSrc: Temporal Temporal Temporal Temporal   SpO2: 98% 97%  95%   Weight:       Height:                                                  BP Readings from Last 3 Encounters:   04/23/19 (!) 144/83   03/05/19 139/84   03/27/17 140/76       NPO Status: Time of last liquid consumption: 2330                                                 Date of last liquid consumption: 04/22/19                        Date of last solid food consumption: 04/22/19    BMI:   Wt Readings from Last 3 Encounters:   04/20/19 175 lb (79.4 kg)   03/05/19 185 lb (83.9 kg)   03/13/17 180 lb (81.6 kg)     Body mass index is 25.11 kg/m². CBC:   Lab Results   Component Value Date    WBC 7.2 04/21/2019    RBC 3.83 04/21/2019    HGB 10.9 04/23/2019    HCT 32.9 04/23/2019    MCV 87.7 04/21/2019    RDW 13.2 04/21/2019     04/21/2019       CMP:   Lab Results   Component Value Date     04/23/2019    K 2.8 04/23/2019    K 3.5 04/22/2019     04/23/2019    CO2 18 04/23/2019    BUN 13 04/23/2019    CREATININE 0.6 04/23/2019    GFRAA >60 04/23/2019    LABGLOM >60 04/23/2019    GLUCOSE 118 04/23/2019    PROT 5.9 04/22/2019    CALCIUM 6.3 04/23/2019    BILITOT 0.5 04/22/2019    ALKPHOS 51 04/22/2019    AST 13 04/22/2019    ALT 9 04/22/2019       POC Tests: No results for input(s): POCGLU, POCNA, POCK, POCCL, POCBUN, POCHEMO, POCHCT in the last 72 hours.     Coags:   Lab Results   Component Value Date    PROTIME 13.3 04/21/2019    INR 1.2 04/21/2019       HCG (If Applicable): No results found for: PREGTESTUR, PREGSERUM, HCG, HCGQUANT     ABGs: No results found for: PHART, PO2ART, JMG4URP, EPQ6NGI, BEART, T5STTPBY     Type & Screen (If Applicable):  No results found for: LABABO, [address]    Anesthesia Evaluation  Patient summary reviewed no history of anesthetic complications:   Airway: Mallampati: II  TM distance: >3 FB   Neck ROM: full  Mouth opening: > = 3 FB Dental: normal exam         Pulmonary: breath sounds clear to auscultation  (+) sleep apnea: on CPAP,  asthma:                            Cardiovascular:  Exercise tolerance: good (>4 METS),   (+) hypertension: moderate, dysrhythmias: atrial flutter,       ECG reviewed  Rhythm: regular  Rate: normal           Beta Blocker:  Dose within 24 Hrs         Neuro/Psych:   Negative Neuro/Psych ROS              GI/Hepatic/Renal:   (+) bowel prep,          ROS comment: GI bleed. Endo/Other:    (+) electrolyte abnormalities (Hypokalemia), . Abdominal:           Vascular:                                        Anesthesia Plan      MAC     ASA 3       Induction: intravenous. Anesthetic plan and risks discussed with patient. Plan discussed with CRNA. Navdeep Hahn MD   4/23/2019      Patient seen and examined on DOS. I have reviewed the prior H&P  No interval changes in medical condition. I agree with the note written above and have made the appropriate addendums.     Florentin Menon  4/24/2019  6:37 AM

## 2019-04-23 NOTE — PLAN OF CARE
Problem: Pain:  Goal: Pain level will decrease  Description  Pain level will decrease  Outcome: Met This Shift     Problem: Pain:  Goal: Control of acute pain  Description  Control of acute pain  Outcome: Met This Shift     Problem: Pain:  Goal: Control of chronic pain  Description  Control of chronic pain  Outcome: Met This Shift     Problem: Falls - Risk of:  Goal: Will remain free from falls  Description  Will remain free from falls  Outcome: Met This Shift     Problem: Falls - Risk of:  Goal: Absence of physical injury  Description  Absence of physical injury  Outcome: Met This Shift

## 2019-04-23 NOTE — PLAN OF CARE
Problem: Malnutrition  (NI-5.2)  Goal: Food and/or Nutrient Delivery  Diet progression  Description  Individualized approach for food/nutrient provision.   Outcome: Met This Shift

## 2019-04-23 NOTE — PROGRESS NOTES
Nutrition Assessment    Type and Reason for Visit: Initial, Positive Nutrition Screen    Nutrition Recommendations: Start oral diet when medically appropriate, To order ONS w/ diet    Nutrition Assessment: Pt moderately malnourished on admit AEB mild muscle/fat loss w/ poor PO intake x3 weeks. At risk for further compromise d/t increased nutrient needs 2/2 prostate CA w/ mets to bone & likely liver/rectum. Will order ONS w/ diet progression and monitor    Malnutrition Assessment:  · Malnutrition Status: Meets the criteria for moderate malnutrition  · Context: Acute illness or injury  · Findings of the 6 clinical characteristics of malnutrition (Minimum of 2 out of 6 clinical characteristics is required to make the diagnosis of moderate or severe Protein Calorie Malnutrition based on AND/ASPEN Guidelines):  1. Energy Intake-Less than or equal to 75% of estimated energy requirement, Greater than or equal to 7 days(x3 weeks per pt statement )    2. Weight Loss-Unable to assess    3. Fat Loss-Mild subcutaneous fat loss, Orbital  4. Muscle Loss-Mild muscle mass loss, Temples (temporalis muscle)  5. Fluid Accumulation-No significant fluid accumulation    6.  Strength-Not measured    Nutrition Risk Level: Moderate    Nutrient Needs:  · Estimated Daily Total Kcal: 7301-0943(MSJ REE:1579 x 1.2SF)  · Estimated Daily Protein (g): (1.3-1.5gm/kg IBW)  · Estimated Daily Total Fluid (ml/day): 3664-2159(ml/kcal)    Nutrition Diagnosis:   · Problem:  Moderate malnutrition, In context of acute illness or injury  · Etiology: related to Insufficient energy/nutrient consumption(2/2 XRT)     Signs and symptoms:  as evidenced by Diet history of poor intake, Mild loss of subcutaneous fat, Mild muscle loss    Objective Information:  · Nutrition-Focused Physical Findings: A&Ox4, abd WDL, hypokalemia, no noted edema, fluids WNL, diarrhea PTA w/ noted possible rectal mass pending C-scope  · Wound Type: None  · Current Nutrition Therapies:  · Oral Diet Orders: NPO   · Oral Diet intake: NPO(pt reports poor appetite PTA)  · Anthropometric Measures:  · Ht: 5' 10\" (177.8 cm)   · Current Body Wt: 182 lb 14.4 oz (83 kg)(4/23 Bedscale)  · Admission Body Wt: 175 lb (79.4 kg)(4/20 no noted wt method)  · Usual Body Wt: 178 lb (80.7 kg)(per pt,no wt hx per EMR)  · % Weight Change:   pt reports 4# wt gain x3 weeks, unable to verify at this time d/t lack of hx on file  · Ideal Body Wt: 166 lb (75.3 kg), % Ideal Body 110%  · BMI Classification: BMI 25.0 - 29.9 Overweight    Nutrition Interventions:   Continued Inpatient Monitoring, Education Initiated, Coordination of Care(reviewed ONS options/preferences)    Nutrition Evaluation:   · Evaluation: Goals set   · Goals: Nutrition Progression     · Monitoring: Nutrition Progression, Skin Integrity, I&O, Weight, Pertinent Labs, Monitor Hemodynamic Status, Monitor Bowel Function, Diarrhea      Electronically signed by Balbir Crawford MS, RD, LD on 4/23/19 at 2:19 PM    Contact Number: [phone]

## 2019-04-23 NOTE — PROGRESS NOTES
Hospitalist Progress Note      Synopsis: Patient admitted on 4/20/2019 past medical history of prostate cancer metastasis, atrial flutter previously on Xarelto and hypertension presents for rectal bleeding. Subjective    Patient seen and examined. Patient is s/p IR liver biopsy   Possible endoscopy per general surgery - wife reports poor prep  Patient has no current complaints. He reports no chest pain shortness of breath lightheadedness. No bowel movements. Exam:  /76   Pulse 87   Temp 97.3 °F (36.3 °C) (Temporal)   Resp 16   Ht 5' 10\" (1.778 m)   Wt 175 lb (79.4 kg)   SpO2 97%   BMI 25.11 kg/m²   General appearance: No apparent distress, appears stated age and cooperative. HEENT: Pupils equal, round, and reactive to light. Conjunctivae/corneas clear. Neck: Supple. No jugular venous distention. Trachea midline. Respiratory:  Normal respiratory effort. Clear to auscultation, bilaterally without Rales/Wheezes/Rhonchi. Cardiovascular: Regular rate and rhythm with normal S1/S2 without murmurs, rubs or gallops. Abdomen: Soft, non-tender, non-distended with normal bowel sounds. Musculoskeletal: No clubbing, cyanosis or edema bilaterally. Brisk capillary refill. 2+ lower extremity pulses (dorsalis pedis).    Skin:  No rashes    Neurologic: awake, alert and following commands     Medications:  Reviewed    Infusion Medications    sodium chloride 75 mL/hr at 04/21/19 0848     Scheduled Medications    potassium chloride  10 mEq Intravenous Once    cefTRIAXone (ROCEPHIN) IV  1 g Intravenous Q24H    montelukast  10 mg Oral Nightly    fluticasone  2 puff Inhalation BID    sodium chloride flush  10 mL Intravenous 2 times per day    tamsulosin  0.8 mg Oral Daily    pregabalin  50 mg Oral BID    metoprolol succinate  50 mg Oral Daily    enzalutamide  160 mg Oral Daily     PRN Meds: oxyCODONE-acetaminophen, potassium chloride **OR** potassium alternative oral replacement **OR** potassium chloride, albuterol, sodium chloride flush, acetaminophen, ondansetron, oxyCODONE-acetaminophen    I/O    Intake/Output Summary (Last 24 hours) at 4/23/2019 0738  Last data filed at 4/23/2019 0649  Gross per 24 hour   Intake 2605 ml   Output 1450 ml   Net 1155 ml       Labs:   Recent Labs     04/21/19  0123  04/22/19  1622 04/23/19  0227 04/23/19  0514   WBC 7.2  --   --   --   --    HGB 11.1*   < > 11.8* 11.8* 10.9*   HCT 33.6*   < > 36.1* 35.9* 32.9*     --   --   --   --     < > = values in this interval not displayed. Recent Labs     04/21/19  0123 04/22/19  0750 04/23/19  0514   * 139 140   K 3.4* 3.5 2.8*   CL 97* 104 107   CO2 23 21* 18*   BUN 21 16 13   CREATININE 0.7 0.6* 0.6*   CALCIUM 8.1* 7.2* 6.3*       Recent Labs     04/21/19  0123 04/22/19  0750   PROT 5.8* 5.9*   ALKPHOS 53 51   ALT 8 9   AST 11 13   BILITOT 0.4 0.5   LIPASE 32  --        Recent Labs     04/21/19  0123   INR 1.2       No results for input(s): Anna Priyanka in the last 72 hours. Chronic labs:  Lab Results   Component Value Date    INR 1.2 04/21/2019       Radiology:  Imaging studies reviewed today. ASSESSMENT:    Active Problems:    GI bleed    Primary prostate cancer with metastasis from prostate to other site [facility])    Hypertension    GIB (gastrointestinal bleeding)    Asthma    Paroxysmal atrial flutter (HCC)    Hepatic lesion  Resolved Problems:    * No resolved hospital problems. *       PLAN:    S/p IR biopsy of liver   C scope today per GS, wife reports poor prep - RN to notify GS, ? enema  Await bx result - likely FU as outpatient. Monitor hemoglobin and hematocrit. Currently stable.  Not requiring transfusion  Replete potassium      Diet: Diet NPO Effective Now Exceptions are: Sips with Meds  Code Status: Full Code    +++++++++++++++++++++++++++++++++++++++++++++++++  Leon Ambrosio MD   [facility].  +++++++++++++++++++++++++++++++++++++++++++++++++  NOTE: This report was

## 2019-04-24 NOTE — ANESTHESIA POSTPROCEDURE EVALUATION
Department of Anesthesiology  Postprocedure Note    Patient: Nathen Daniels  MRN: [id number]  YOB: 1945  Date of evaluation: 4/24/2019  Time:  11:23 AM     Procedure Summary     Date:  04/24/19 Room / Location:  [facility] 02 / [facility]    Anesthesia Start:  9325 Anesthesia Stop:  0745    Procedure:  COLONOSCOPY DIAGNOSTIC (N/A ) Diagnosis:  (?)    Surgeon:  Shakeel Morley MD Responsible Provider:  Roland Loving DO    Anesthesia Type:  MAC ASA Status:  3          Anesthesia Type: MAC    Alden Phase I: Aledn Score: 10    Alden Phase II:      Last vitals: Reviewed and per EMR flowsheets.        Anesthesia Post Evaluation    Patient location during evaluation: PACU  Patient participation: complete - patient participated  Level of consciousness: awake and alert  Pain score: 2  Airway patency: patent  Nausea & Vomiting: no nausea and no vomiting  Complications: no  Cardiovascular status: blood pressure returned to baseline and hemodynamically stable  Respiratory status: acceptable  Hydration status: euvolemic

## 2019-04-24 NOTE — PROGRESS NOTES
Hospitalist Progress Note      PCP: Daily Romo MD    Date of Admission: 4/20/2019  Days in the hospital: 3    Chief Complaint: GI bleed    Hospital Course:     Patient admitted on 4/20/2019 past medical history of prostate cancer metastasis, atrial flutter previously on Xarelto and hypertension presents for rectal bleeding. Colonoscopy performed and showed colitis. Subjective  Patient seen and examined at bedside. Patient states that he is hungry. No other complaints at this time. Patient denies any fevers, chills, chest pain, shortness of breath, nausea, vomiting. Medications:  Reviewed    Infusion Medications    sodium chloride 75 mL/hr at 04/23/19 1121     Scheduled Medications    cefTRIAXone (ROCEPHIN) IV  1 g Intravenous Q24H    montelukast  10 mg Oral Nightly    fluticasone  2 puff Inhalation BID    sodium chloride flush  10 mL Intravenous 2 times per day    tamsulosin  0.8 mg Oral Daily    pregabalin  50 mg Oral BID    metoprolol succinate  50 mg Oral Daily    enzalutamide  160 mg Oral Daily     PRN Meds: HYDROmorphone, oxyCODONE-acetaminophen, potassium chloride **OR** potassium alternative oral replacement **OR** potassium chloride, albuterol, sodium chloride flush, acetaminophen, ondansetron, oxyCODONE-acetaminophen      Intake/Output Summary (Last 24 hours) at 4/24/2019 1001  Last data filed at 4/24/2019 0844  Gross per 24 hour   Intake 3074 ml   Output 1700 ml   Net 1374 ml       Exam:    BP (!) 140/86   Pulse 74   Temp 97.8 °F (36.6 °C) (Temporal)   Resp 16   Ht 5' 10\" (1.778 m)   Wt 182 lb (82.6 kg)   SpO2 96%   BMI 26.11 kg/m²     General appearance: No apparent distress, appears stated age and cooperative. HEENT: Pupils equal, round, and reactive to light. Conjunctivae/corneas clear. Neck: Supple. No jugular venous distention. Trachea midline. Respiratory:  Normal respiratory effort.  Clear to auscultation, bilaterally without Rales/Wheezes/Rhonchi. Cardiovascular: Regular rate and rhythm with normal S1/S2 without murmurs, rubs or gallops. Abdomen: Soft, non-tender, non-distended with normal bowel sounds. Musculoskeletal: No clubbing, cyanosis or edema bilaterally. Brisk capillary refill. 2+ lower extremity pulses (dorsalis pedis). Skin:  No rashes    Neurologic: awake, alert and following commands       Labs:   Recent Labs     04/23/19 1943 04/23/19  2324 04/24/19  0852   HGB 12.4* 11.4* 11.8*   HCT 38.2 34.8* 35.7*     Recent Labs     04/22/19  0750 04/23/19  0514 04/23/19 1943 04/24/19  0852    140  --  141   K 3.5 2.8* 3.3* 3.9    107  --  110*   CO2 21* 18*  --  23   BUN 16 13  --  11   CREATININE 0.6* 0.6*  --  0.6*   CALCIUM 7.2* 6.3*  --  6.6*     Recent Labs     04/22/19  0750   AST 13   ALT 9   BILITOT 0.5   ALKPHOS 51     No results for input(s): INR in the last 72 hours. No results for input(s): Paonia Sharron in the last 72 hours. Imaging:  CT NEEDLE BIOPSY LIVER PERCUTANEOUS   Final Result   Successful uncomplicated CT and fluoroscopic guided liver   mass biopsy. If there are any physician concerns regarding this report, a   Radiologist can be reached by calling the following number [phone]. CT GUIDED NEEDLE PLACEMENT   Final Result   Successful uncomplicated CT and fluoroscopic guided liver   mass biopsy. If there are any physician concerns regarding this report, a   Radiologist can be reached by calling the following number [phone]. CT ABDOMEN PELVIS W IV CONTRAST Additional Contrast? None   Final Result   1. There are inhomogeneous hypoattenuation hepatic lesions worrisome for    metastatic disease. The largest is present in the distal tip of the liver    measures up to 4 cm. 2. There is asymmetric bowel wall thickening of the distal rectum and anus on    the right.  A prominent lymph node seen within the right ischial rectal fossa    and mildly  Moderate protein-calorie malnutrition ([facility] [E44.0] 04/23/2019    GI bleed [K92.2] 04/21/2019    Paroxysmal atrial flutter ([facility] [I48.92] 04/21/2019    Primary prostate cancer with metastasis from prostate to other site [facility]) Clarence Cohen     Hypertension [I10]     GIB (gastrointestinal bleeding) [K92.2]     Asthma [J45.849]        Plan:  · Colonoscopy performed today  · Heme/onc on board - awaiting liver biopsy results  · Advance diet  Based on surgery recommendations  · Possible DC within the next 48 hours    · Body mass index is 26.11 kg/m². · DVT Prophylaxis  · PCDs    · Diet  · Diet NPO, After Midnight Exceptions are: Sips with Meds    · Code Status  · Full Code    · PT/OT Eval Status  · On board     · Disposition  · Likely home at [facility] [address] - Chief Hospitalist  Please contact me through perfect serve    NOTE: This report was transcribed using voice recognition software. Every effort was made to ensure accuracy; however, inadvertent computerized transcription errors may be present.

## 2019-04-24 NOTE — DISCHARGE INSTR - COC
Problems:  Patient Active Problem List   Diagnosis Code    Dog bite of right thigh Y19.689L, W54. 0XXA    GI bleed K92.2    Primary prostate cancer with metastasis from prostate to other site [facility]) C61    Hypertension I10    GIB (gastrointestinal bleeding) K92.2    Asthma J45.909    Paroxysmal atrial flutter (HCC) I48.92    Hepatic lesion K76.9    Hypokalemia E87.6    Moderate protein-calorie malnutrition (HCC) E44.0       Isolation/Infection:   Isolation          No Isolation            Nurse Assessment:  Last Vital Signs: /70   Pulse 68   Temp 98.2 °F (36.8 °C) (Temporal)   Resp 16   Ht 5' 10\" (1.778 m)   Wt 182 lb (82.6 kg)   SpO2 96%   BMI 26.11 kg/m²     Last documented pain score (0-10 scale): Pain Level: 9  Last Weight:   Wt Readings from Last 1 Encounters:   04/23/19 182 lb (82.6 kg)     Mental Status:  disoriented, oriented and alert    IV Access:  - None    Nursing Mobility/ADLs:  Walking   Assisted  Transfer  Assisted  Bathing  Assisted  Dressing  Assisted  Toileting  Assisted  Feeding  Assisted  Med Admin  Independent  Med Delivery   whole    Wound Care Documentation and Therapy:        Elimination:  Continence:   · Bowel: {YES / LT:94734}  · Bladder: {YES / GP:74545}  Urinary Catheter: Insertion Date: 3/05/2019   Colostomy/Ileostomy/Ileal Conduit: {YES / TN:73691}       Date of Last BM: ***    Intake/Output Summary (Last 24 hours) at 4/24/2019 1554  Last data filed at 4/24/2019 1446  Gross per 24 hour   Intake 4274 ml   Output 1500 ml   Net 2774 ml     I/O last 3 completed shifts: In: 1835 [P.O.:1200; I.V.:3074]  Out: 1750 [Urine:1750]    Safety Concerns:      At Risk for Falls    Impairments/Disabilities:      None    Nutrition Therapy:  Current Nutrition Therapy:   - Oral Diet:  General    Routes of Feeding: Oral  Liquids: {Slp liquid thickness:53579}  Daily Fluid Restriction: no  Last Modified Barium Swallow with Video (Video Swallowing Test): not done    Treatments at the Time

## 2019-04-24 NOTE — PROGRESS NOTES
0.6*  --   --   --     < > = values in this interval not displayed. Objective:     Patient Vitals for the past 8 hrs:   BP Temp Temp src Pulse Resp SpO2   04/24/19 0900 (!) 140/86 97.8 °F (36.6 °C) Temporal 74 16 96 %   04/24/19 0831 134/68 -- -- 70 13 97 %   04/24/19 0830 -- -- -- 70 12 96 %   04/24/19 0816 133/70 -- -- 70 14 97 %   04/24/19 0815 -- -- -- 70 17 97 %   04/24/19 0800 138/78 -- -- 71 15 97 %   04/24/19 0757 131/68 97.1 °F (36.2 °C) Temporal 62 16 97 %   04/24/19 0744 (!) 98/55 -- -- 75 16 94 %             Impression:     Active Problems:    GI bleed    Primary prostate cancer with metastasis from prostate to other site [facility])    Hypertension    GIB (gastrointestinal bleeding)    Asthma    Paroxysmal atrial flutter (HCC)    Hepatic lesion    Hypokalemia    Moderate protein-calorie malnutrition (HCC)  Resolved Problems:    * No resolved hospital problems.  *          Plan:     Await path from liver biopsy

## 2019-04-24 NOTE — PLAN OF CARE
Problem: Pain:  Goal: Pain level will decrease  Description  Pain level will decrease  4/24/2019 1137 by Dinh Santiago RN  Outcome: Met This Shift  4/23/2019 2300 by Anusha Coleman RN  Outcome: Met This Shift  Goal: Control of acute pain  Description  Control of acute pain  Outcome: Met This Shift  Goal: Control of chronic pain  Description  Control of chronic pain  Outcome: Met This Shift     Problem: Falls - Risk of:  Goal: Will remain free from falls  Description  Will remain free from falls  Outcome: Met This Shift  Goal: Absence of physical injury  Description  Absence of physical injury  Outcome: Met This Shift     Problem: Risk for Impaired Skin Integrity  Goal: Tissue integrity - skin and mucous membranes  Description  Structural intactness and normal physiological function of skin and  mucous membranes.   Outcome: Met This Shift

## 2019-04-24 NOTE — CARE COORDINATION
Per Letty Gong from [facility], the can not accept the patient. Referral made to Jean-Pierre Narvaez at [facility], the patient's second choice. Await acceptance.   Estefanía Diaz RN CM

## 2019-04-24 NOTE — CARE COORDINATION
Per Shamar Briggs from [facility], the can accept the patient and she notified the patient and his wife. He can discharge their when medically ready. He is a click 6 and no need to wait for precert. Charge nurse notified.   Evaristo Felty RN CM

## 2019-04-25 NOTE — PROGRESS NOTES
GENERAL SURGERY  DAILY PROGRESS NOTE  4/25/2019    Subjective:  No pain since colonoscopy. Having loose stools still. Tolerated food. Objective:  /64   Pulse 72   Temp 98.2 °F (36.8 °C) (Temporal)   Resp 18   Ht 5' 10\" (1.778 m)   Wt 182 lb (82.6 kg)   SpO2 96%   BMI 26.11 kg/m²     General appearance: alert, cooperative and in no acute distress.   Eyes: grossly normal  Lungs: nonlabored breathing  Heart: regular rate  Abdomen: soft, non-tender, non distended    Assessment/Plan:  76 y.o. 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

## 2019-04-25 NOTE — PROGRESS NOTES
Subjective: The patient is awake and alert. He feels tired and fatigued. He still has diarrhea. He has mild abdominal cramps off and on since he had a colonoscopy yesterday. No recent blood in the stools or melena. He has no chest pain or dyspnea. Lower back pain can be bothersome as usual but it responds well to pain medications  Objective:    BP (!) 148/73   Pulse 76   Temp 97.1 °F (36.2 °C) (Temporal)   Resp 18   Ht 5' 10\" (1.778 m)   Wt 182 lb (82.6 kg)   SpO2 96%   BMI 26.11 kg/m²     General: NAD  Heart:  RRR, 9-1/8 systolic murmur.   Lungs:  CTA bilaterally, no wheeze, rales or rhonchi  Abd: bowel sounds present, nontender, nondistended, no masses  Extrem:  No clubbing, cyanosis, or edema  Lymphatics: No palpable adenopathy in cervical and supraclavicular regions  Skin: Intact, no petechia or purpura    CBC with Differential:    Lab Results   Component Value Date    WBC 7.2 04/21/2019    RBC 3.83 04/21/2019    HGB 11.3 04/25/2019    HCT 34.6 04/25/2019     04/21/2019    MCV 87.7 04/21/2019    MCH 29.0 04/21/2019    MCHC 33.0 04/21/2019    RDW 13.2 04/21/2019    LYMPHOPCT 3.5 04/21/2019    MONOPCT 7.8 04/21/2019    MYELOPCT 1.7 04/21/2019    BASOPCT 0.1 04/21/2019    MONOSABS 0.58 04/21/2019    LYMPHSABS 0.29 04/21/2019    EOSABS 0.19 04/21/2019    BASOSABS 0.00 04/21/2019     CMP:    Lab Results   Component Value Date     04/25/2019    K 3.2 04/25/2019    K 3.5 04/22/2019     04/25/2019    CO2 22 04/25/2019    BUN 12 04/25/2019    CREATININE 0.6 04/25/2019    GFRAA >60 04/25/2019    LABGLOM >60 04/25/2019    GLUCOSE 130 04/25/2019    PROT 5.1 04/24/2019    LABALBU 2.9 04/24/2019    CALCIUM 6.6 04/25/2019    BILITOT 0.4 04/24/2019    ALKPHOS 44 04/24/2019    AST 13 04/24/2019    ALT 9 04/24/2019              Current Facility-Administered Medications:     amLODIPine (NORVASC) tablet 5 mg, 5 mg, Oral, Daily, Yeni Palma DO, 5 mg at 04/25/19 0847    HYDROmorphone (DILAUDID) injection 0.5 mg, 0.5 mg, Intravenous, Q4H PRN, Amy Rizvi MD    oxyCODONE-acetaminophen (PERCOCET) 5-325 MG per tablet 2 tablet, 2 tablet, Oral, Q6H PRN, Amy Rizvi MD, 2 tablet at 04/25/19 0643    potassium chloride (KLOR-CON M) extended release tablet 40 mEq, 40 mEq, Oral, PRN, 40 mEq at 04/24/19 0527 **OR** potassium bicarb-citric acid (EFFER-K) effervescent tablet 40 mEq, 40 mEq, Oral, PRN **OR** potassium chloride 10 mEq/100 mL IVPB (Peripheral Line), 10 mEq, Intravenous, PRN, Amy Rizvi MD, Last Rate: 100 mL/hr at 04/23/19 1553, 10 mEq at 04/23/19 1553    0.9 % sodium chloride infusion, , Intravenous, Continuous, Amy Rizvi MD, Last Rate: 75 mL/hr at 04/24/19 1125    cefTRIAXone (ROCEPHIN) 1 g in sterile water 10 mL IV syringe, 1 g, Intravenous, Q24H, Amy Rizvi MD, 1 g at 04/25/19 0341    montelukast (SINGULAIR) tablet 10 mg, 10 mg, Oral, Nightly, Saad Narrow, APRN - CNP, 10 mg at 04/24/19 2129    albuterol (PROVENTIL) nebulizer solution 2.5 mg, 2.5 mg, Nebulization, Q6H PRN, Saad Narrow, APRN - CNP    fluticasone (FLOVENT HFA) 110 MCG/ACT inhaler 2 puff, 2 puff, Inhalation, BID, Saad Narrow, APRN - CNP    sodium chloride flush 0.9 % injection 10 mL, 10 mL, Intravenous, 2 times per day, Saad Narrow, APRN - CNP    sodium chloride flush 0.9 % injection 10 mL, 10 mL, Intravenous, PRN, Saad Narrow, APRN - CNP, 10 mL at 04/23/19 0345    acetaminophen (TYLENOL) tablet 650 mg, 650 mg, Oral, Q4H PRN, Saad Narrow, APRN - CNP    ondansetron (ZOFRAN) injection 4 mg, 4 mg, Intravenous, Q6H PRN, Saad Narrow, APRN - CNP    tamsulosin (FLOMAX) capsule 0.8 mg, 0.8 mg, Oral, Daily, Saad Narrow, APRN - CNP, 0.8 mg at 04/25/19 0850    pregabalin (LYRICA) capsule 50 mg, 50 mg, Oral, BID, Saad Narrow, APRN - CNP, 50 mg at 04/25/19 0845    metoprolol succinate (TOPROL XL) extended release tablet 50 mg, 50 mg, Oral, Daily, Saad Narrow, APRN - CNP, 50 mg at 04/25/19 0845   enzalutamide (XTANDI) capsule 160 mg, 160 mg, Oral, Daily, Arlene Ram, APRN - CNP, 160 mg at 04/24/19 1721    oxyCODONE-acetaminophen (PERCOCET)  MG per tablet 1 tablet, 1 tablet, Oral, Q6H PRN, Arlene Ram, APRN - CNP, 1 tablet at 04/24/19 0455    Assessment:    Active Problems:    GI bleed    Primary prostate cancer with metastasis from prostate to other site [facility])    Hypertension    GIB (gastrointestinal bleeding)    Asthma    Paroxysmal atrial flutter (HCC)    Hepatic lesion    Hypokalemia    Moderate protein-calorie malnutrition ([facility]  Resolved Problems:    * No resolved hospital problems. *    Vitor Pitts is a 75 yo gentleman known to Dr. Ayde Lyons and Dr. 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  Treated with Casodex and Lupron from May 2018. Roma Palacios in June 2018.  Increase in PSA and bone metastases.  Switched Casodex to Francis in Feb 2019.  Undergoing palliative XRT at [facility] the back for severe pain.  Admitted with dizziness and rectal bleeding, also found to have a UTI    4/21/19 CT scan abd/pelvis  1. There are inhomogeneous hypoattenuation hepatic lesions worrisome for    metastatic disease. The largest is present in the distal tip of the liver    measures up to 4 cm. 2. There is asymmetric bowel wall thickening of the distal rectum and anus on    the right. A prominent lymph node seen within the right ischial rectal fossa    and mildly prominent superficial inguinal lymph nodes are seen on the right,    findings worrisome for a right anorectal malignancy. 3. The prostate gland is prominent and there is obliteration of fat planes    between the prostate gland, seminal vesicles and rectum. This finding is    worrisome for prostatic malignancy. 4. There is a prominent left renal mass present in the upper pole measuring 5.8    cm, findings could represent a primary renal malignancy.     5. Multiple osseous sclerotic metastatic lesions are seen. 6. There is diffuse bladder wall thickening possibly representing chronic    cystitis. 7. Bilateral simple appearing renal cysts, largest measuring 7.5 cm in the    upper pole of the right kidney. There is a smaller intermediate attenuation    cystic mass seen in the right kidney that may represent hemorrhagic cyst    although a solid renal nodularity cannot be entirely excluded as well. 8. Coarse calcifications within the pancreas compatible with chronic    pancreatitis. 9. Small  would benefit from physical therapy        bilateral pulmonary nodularities each measuring approximately 2    mm. Fleischner follow up recommendations for incidental nodules are not    indicated. Follow up per patient's medical condition. Colonoscopy showed evidence colitis    Patient had US guided bx of the liver 4/22/19    Plan:  I told the patient had liver biopsy is pending. He will continue taking Xtandi until further notice. He still has 2 days of palliative XRT to complete. Treatment should be restarted after discharge. Continue same pain medication regimen for now.   Keep same follow-up with Dr. Jolaine Merlin after discharge 5/7    Electronically signed by Miley Cox MD on 4/25/2019 at 9:49 AM

## 2019-04-25 NOTE — PROGRESS NOTES
clear.  Neck: Supple. No jugular venous distention. Trachea midline. Respiratory:  Normal respiratory effort. Clear to auscultation, bilaterally without Rales/Wheezes/Rhonchi. Cardiovascular: Regular rate and rhythm with normal S1/S2 without murmurs, rubs or gallops. Abdomen: Soft, non-tender, non-distended with normal bowel sounds. Musculoskeletal: No clubbing, cyanosis or edema bilaterally. Brisk capillary refill. 2+ lower extremity pulses (dorsalis pedis). Skin:  No rashes    Neurologic: awake, alert and following commands       Labs:   Recent Labs     04/24/19  1731 04/25/19  0144 04/25/19  0643   HGB 11.4* 10.8* 11.3*   HCT 35.6* 33.3* 34.6*     Recent Labs     04/23/19  0514 04/23/19  1943 04/24/19  0852 04/25/19  0643     --  141 139   K 2.8* 3.3* 3.9 3.2*     --  110* 108*   CO2 18*  --  23 22   BUN 13  --  11 12   CREATININE 0.6*  --  0.6* 0.6*   CALCIUM 6.3*  --  6.6* 6.6*     Recent Labs     04/24/19  1151   AST 13   ALT 9   BILIDIR <0.2   BILITOT 0.4   ALKPHOS 44     No results for input(s): INR in the last 72 hours. No results for input(s): Evern Fallston in the last 72 hours. Imaging:  CT NEEDLE BIOPSY LIVER PERCUTANEOUS   Final Result   Successful uncomplicated CT and fluoroscopic guided liver   mass biopsy. If there are any physician concerns regarding this report, a   Radiologist can be reached by calling the following number [phone]. CT GUIDED NEEDLE PLACEMENT   Final Result   Successful uncomplicated CT and fluoroscopic guided liver   mass biopsy. If there are any physician concerns regarding this report, a   Radiologist can be reached by calling the following number [phone]. CT ABDOMEN PELVIS W IV CONTRAST Additional Contrast? None   Final Result   1. There are inhomogeneous hypoattenuation hepatic lesions worrisome for    metastatic disease. The largest is present in the distal tip of the liver    measures up to 4 cm.     2. Maykel Reaves MD.            Assessment/Plan:  Active Hospital Problems    Diagnosis Date Noted    Hepatic lesion [K76.9] 04/23/2019    Hypokalemia [E87.6] 04/23/2019    Moderate protein-calorie malnutrition ([address] [E44.0] 04/23/2019    GI bleed [K92.2] 04/21/2019    Paroxysmal atrial flutter (HCC) [I48.92] 04/21/2019    Primary prostate cancer with metastasis from prostate to other site [facility]) Sheyarielle Hernandez     Hypertension [I10]     GIB (gastrointestinal bleeding) [K92.2]     Asthma [J45.909]        Plan:  · Colonoscopy performed on 4/24  · Heme/onc on board - awaiting liver biopsy results  · Advance diet  Based on surgery recommendations  · DC today     Body mass index is 26.11 kg/m². · DVT Prophylaxis  · PCDs    · Diet  DIET GENERAL;    · Code Status  Full Code    · PT/OT Eval Status  · On board     · Disposition  · Likely home at [facility] [employer] - Chief Hospitalist  Please contact me through perfect serve    NOTE: This report was transcribed using voice recognition software. Every effort was made to ensure accuracy; however, inadvertent computerized transcription errors may be present.

## 2019-04-25 NOTE — PLAN OF CARE
Problem: Falls - Risk of:  Goal: Will remain free from falls  Description  Will remain free from falls  4/24/2019 2347 by Delio Arteaga RN  Outcome: Met This Shift

## 2019-04-25 NOTE — DISCHARGE SUMMARY
chemo orally. Last Radiation treatment was Friday. Due to 2 more. Diarrhea for  About 1 week. Was constipated prior to that. Diarrhea related to radiation. One episode today without blood noted. 3 weeks catheter- plan for possible removal after radiation next week.      ED course: /80, HR 94, afebrile and 95% RA. Labs showed Na 131, because of 3.2, H&H of 11.1 and 33.6. CT of abdomen and pelvis showed pelvic lesions worrisome for metastatic disease renal mass on left, multiple osseous sclerotic metastatic lesions, findings worrisome for right anorectal malignancy. He was treated with 1 L of normal saline, Rocephin 1 g for positive urinalysis. He was admitted for further treatment. Hospital Course:   Mr. Denis Meza, a 76y.o. year old male  who  has a past medical history of Arthritis, Asthma, Atrial flutter, paroxysmal ([address] GIB (gastrointestinal bleeding), Hypertension, Paroxysmal atrial flutter ([address] and Primary prostate cancer with metastasis from prostate to other site [facility]). During the course the patient's hospital stay he was admitted on 4/20/2019 past medical history of prostate cancer metastasis, atrial flutter previously on Xarelto and hypertension presents for rectal bleeding. Colonoscopy performed and showed colitis. Patient has been getting radiation to lumbosacral spine and is the likely cause of colitis. With time the patient was deemed stable for DC on 4/25/2019. he is to follow up with PCP within 1 week and with specialists as directed.      Consults:     IP CONSULT TO INTERNAL MEDICINE  IP CONSULT TO GENERAL SURGERY  IP CONSULT TO ONCOLOGY    Significant Diagnostic Studies:  As above      Discharge Instructions/Follow-up:  As above       Activity: activity as tolerated    Physical Exam:  Vitals:    04/25/19 0800   BP: (!) 148/73   Pulse: 76   Resp: 18   Temp: 97.1 °F (36.2 °C)   SpO2: 96%       General appearance: No apparent distress, appears stated age and Discharge Medications:     Discharge Medication List as of 4/25/2019 10:28 AM           Details   metoprolol succinate (TOPROL XL) 50 MG extended release tablet Take 50 mg by mouth dailyHistorical Med      tamsulosin (FLOMAX) 0.4 MG capsule Take 0.8 mg by mouth dailyHistorical Med      pregabalin (LYRICA) 50 MG capsule Take 50 mg by mouth 2 times daily. Historical Med      enzalutamide (XTANDI) 40 MG capsule Take 160 mg by mouth dailyHistorical Med      potassium chloride (KLOR-CON M) 20 MEQ TBCR extended release tablet Take 20 mEq by mouth every other dayHistorical Med      meloxicam (MOBIC) 7.5 MG tablet Take 7.5 mg by mouth dailyHistorical Med      mometasone (ASMANEX 14 METERED DOSES) 220 MCG/INH inhaler Inhale 2 puffs into the lungs daily, Inhalation, DAILY, Until Discontinued, Historical Med      AMLODIPINE BESYLATE PO Take 5 mg by mouth daily Historical Med      montelukast (SINGULAIR) 10 MG tablet Take 10 mg by mouth nightlyHistorical Med      albuterol (PROVENTIL) (2.5 MG/3ML) 0.083% nebulizer solution Take 2.5 mg by nebulization every 6 hours as needed for WheezingHistorical Med      oxyCODONE-acetaminophen (PERCOCET)  MG per tablet Take 1 tablet by mouth every 6 hours as needed for Pain. Historical Med             Time Spent on discharge is more than 31 min in the examination, evaluation, counseling and review of medications and discharge plan. Signed:    Israel Heart DO   4/25/2019      Thank you Amaury Varela MD for the opportunity to be involved in this patient's care. If you have any questions or concerns please feel free to contact me. NOTE: This report was transcribed using voice recognition software. Every effort was made to ensure accuracy; however, inadvertent computerized transcription errors may be present.

## 2019-04-25 NOTE — PROGRESS NOTES
Physical Therapy  Facility/Department: [facility] MED SURG ONC  Daily Treatment Note  NAME: Bharat Jesus  : 1945  MRN: [id number]    Date of Service: 2019          Evaluating Therapist: Umesh Fallon PT, DPT         Equipment Recommendations: to be determined.      Room #: 6824/8090-B  DIAGNOSIS: GI bleed  PRECAUTIONS: CA with METs, fall risk      Social:  Pt lives with [place] in a 1 floor plan 4 steps and 1 rails to enter. Prior to admission pt walked with w/w                Initial Evaluation  Date:  Treatment  19      Short Term/ Long Term   Goals   Was pt agreeable to Eval/treatment? yes Yes       Does pt have pain? -8/10 LBP 6/10 LBP, increased w/ sitting but pt did not quantify      Bed Mobility  Rolling: NT  sidelying to sit: mod A   Sit to sidelying: min A  Scooting: NT Rolling: SBA to L in supine for hygiene , mod A to R to come to EOB ( log roll ea time)   sidelying to sit: mod A   Sit to sidelying: NT  Scooting: MIN A SBA   Transfers Sit to stand: min A x 2  Stand to sit: min A x 2  Stand pivot: NT Sit to stand: min A x 2  Stand to sit: min A x 2  Stand pivot: min A +2 CGA   Ambulation    NT 3 small steps HHA +2 , min +2 EOB to B/S chair  15+ feet with w/w with min A    Stair negotiation: ascended and descended NT  NT NT   AM-PAC Raw Score          Balance: fair    Patient education  Pt was educated on importance of mobility for optimal recovery , and safety w / functional mobility     Patient response to education:   Pt verbalized understanding Pt demonstrated skill Pt requires further education in this area   x X w/ cues   x     Additional Comments: pt supine , bed flat and pt alert. Encouragement to participate. Pt anxious about mobility and unsure he should get OOB. Pt stated , \"my back won't bend. \"  W/ encouragement had pt raise HOB to see what he could tolerate. Pt came to approximately 80 degrees sitting in bed.  Expressed to pt that this was enough flex to sit in B/S chair . Pt lowered HOB , and reported \" I need changed. \"  Aid and therapist assisted pt w/ hygiene and donning brief. Pt log rolled SBA to L but required increased A to log roll R, and come to sit EOB. Pt did not c/o increased pain sitting EOB , but did report dizzy. Symptoms passed after seated rest EOB approx 5 mins. Cued for safety w/ transfers and pt pivoted to B/S chair. Pt remained in B/S chair w/ encouragement to attempt to eat breakfast OOB. Pt agreed , but reported \" increased pressure. \" instructed pt to call for nsg A to return to bed if it became too difficult to remain in chair. pt reported understanding      Pt was left B/S chair with call light left by patient. Chair/bed alarm: NA     Time in: 744  Time out: 808      Continue with physical therapy current plan of care.     Adryan Rhodes PTA [id number]

## 2020-01-01 ENCOUNTER — ANESTHESIA (OUTPATIENT)
Dept: ENDOSCOPY | Age: 75
End: 2020-01-01
Payer: MEDICARE

## 2020-01-01 ENCOUNTER — HOSPITAL ENCOUNTER (OUTPATIENT)
Age: 75
Setting detail: OUTPATIENT SURGERY
Discharge: HOME | End: 2020-02-03
Attending: INTERNAL MEDICINE | Admitting: INTERNAL MEDICINE
Payer: MEDICARE

## 2020-01-01 ENCOUNTER — HOSPITAL ENCOUNTER (OUTPATIENT)
Age: 75
Discharge: HOME | End: 2020-03-12

## 2020-01-01 ENCOUNTER — TELEPHONE (OUTPATIENT)
Dept: OTHER | Facility: CLINIC | Age: 75
End: 2020-01-01

## 2020-01-01 ENCOUNTER — HOSPITAL ENCOUNTER (INPATIENT)
Age: 75
LOS: 3 days | Discharge: HOSPICE | DRG: 871 | End: 2020-03-31
Attending: EMERGENCY MEDICINE | Admitting: FAMILY MEDICINE
Payer: MEDICARE

## 2020-01-01 ENCOUNTER — APPOINTMENT (OUTPATIENT)
Dept: ULTRASOUND IMAGING | Age: 75
DRG: 871 | End: 2020-01-01
Payer: MEDICARE

## 2020-01-01 ENCOUNTER — ANESTHESIA EVENT (OUTPATIENT)
Dept: ENDOSCOPY | Age: 75
End: 2020-01-01
Payer: MEDICARE

## 2020-01-01 ENCOUNTER — HOSPITAL ENCOUNTER (OUTPATIENT)
Age: 75
Discharge: HOME | End: 2020-03-21

## 2020-01-01 ENCOUNTER — APPOINTMENT (OUTPATIENT)
Dept: GENERAL RADIOLOGY | Age: 75
DRG: 871 | End: 2020-01-01
Payer: MEDICARE

## 2020-01-01 ENCOUNTER — HOSPITAL ENCOUNTER (INPATIENT)
Age: 75
LOS: 6 days | Discharge: HOME | DRG: 871 | End: 2020-03-26
Attending: EMERGENCY MEDICINE | Admitting: INTERNAL MEDICINE
Payer: MEDICARE

## 2020-01-01 ENCOUNTER — APPOINTMENT (OUTPATIENT)
Dept: CT IMAGING | Age: 75
DRG: 871 | End: 2020-01-01
Payer: MEDICARE

## 2020-01-01 VITALS
BODY MASS INDEX: 24.62 KG/M2 | SYSTOLIC BLOOD PRESSURE: 142 MMHG | HEIGHT: 70 IN | TEMPERATURE: 97.2 F | OXYGEN SATURATION: 95 % | DIASTOLIC BLOOD PRESSURE: 95 MMHG | RESPIRATION RATE: 22 BRPM | HEART RATE: 106 BPM | WEIGHT: 172 LBS

## 2020-01-01 VITALS
RESPIRATION RATE: 20 BRPM | DIASTOLIC BLOOD PRESSURE: 72 MMHG | OXYGEN SATURATION: 99 % | SYSTOLIC BLOOD PRESSURE: 110 MMHG

## 2020-01-01 VITALS
DIASTOLIC BLOOD PRESSURE: 79 MMHG | HEART RATE: 91 BPM | BODY MASS INDEX: 25.75 KG/M2 | HEIGHT: 70 IN | WEIGHT: 179.9 LBS | SYSTOLIC BLOOD PRESSURE: 134 MMHG | RESPIRATION RATE: 18 BRPM | OXYGEN SATURATION: 93 % | TEMPERATURE: 98.1 F

## 2020-01-01 VITALS
HEART RATE: 55 BPM | DIASTOLIC BLOOD PRESSURE: 69 MMHG | TEMPERATURE: 98 F | RESPIRATION RATE: 18 BRPM | BODY MASS INDEX: 26.48 KG/M2 | WEIGHT: 178.8 LBS | OXYGEN SATURATION: 98 % | SYSTOLIC BLOOD PRESSURE: 170 MMHG | HEIGHT: 69 IN

## 2020-01-01 DIAGNOSIS — C61 PROSTATE CANCER (HCC): ICD-10-CM

## 2020-01-01 DIAGNOSIS — Z66 DNR (DO NOT RESUSCITATE): ICD-10-CM

## 2020-01-01 DIAGNOSIS — Z71.89 DNR (DO NOT RESUSCITATE) DISCUSSION: ICD-10-CM

## 2020-01-01 DIAGNOSIS — I48.91 ATRIAL FIBRILLATION WITH RVR (HCC): ICD-10-CM

## 2020-01-01 DIAGNOSIS — N28.89 RENAL MASS: ICD-10-CM

## 2020-01-01 DIAGNOSIS — N39.0 URINARY TRACT INFECTION WITHOUT HEMATURIA, SITE UNSPECIFIED: Primary | ICD-10-CM

## 2020-01-01 DIAGNOSIS — J90 PLEURAL EFFUSION: ICD-10-CM

## 2020-01-01 DIAGNOSIS — R16.0 LIVER MASS: ICD-10-CM

## 2020-01-01 DIAGNOSIS — K62.89 RECTAL MASS: ICD-10-CM

## 2020-01-01 DIAGNOSIS — C79.9 METASTATIC DISEASE (HCC): Primary | ICD-10-CM

## 2020-01-01 DIAGNOSIS — G89.3 CANCER RELATED PAIN: ICD-10-CM

## 2020-01-01 DIAGNOSIS — J18.9 PNEUMONIA DUE TO ORGANISM: ICD-10-CM

## 2020-01-01 DIAGNOSIS — Z51.5 HOSPICE CARE: ICD-10-CM

## 2020-01-01 DIAGNOSIS — R79.89 ELEVATED PROCALCITONIN: ICD-10-CM

## 2020-01-01 LAB
ALBUMIN SERPL-MCNC: 2.7 G/DL (ref 3.5–5.2)
ALBUMIN SERPL-MCNC: 2.7 G/DL (ref 3.5–5.2)
ALBUMIN SERPL-MCNC: 2.8 G/DL (ref 3.5–5.2)
ALBUMIN SERPL-MCNC: 2.9 G/DL (ref 3.5–5.2)
ALBUMIN SERPL-MCNC: 3.3 G/DL (ref 3.5–5.2)
ALP BLD-CCNC: 60 U/L (ref 40–129)
ALP BLD-CCNC: 65 U/L (ref 40–129)
ALP BLD-CCNC: 67 U/L (ref 40–129)
ALP BLD-CCNC: 72 U/L (ref 40–129)
ALP BLD-CCNC: 75 U/L (ref 40–129)
ALT SERPL-CCNC: 10 U/L (ref 0–40)
ALT SERPL-CCNC: 11 U/L (ref 0–40)
ALT SERPL-CCNC: 17 U/L (ref 0–40)
ALT SERPL-CCNC: 8 U/L (ref 0–40)
ALT SERPL-CCNC: 9 U/L (ref 0–40)
AMMONIA: 32 UMOL/L (ref 16–60)
AMMONIA: 43 UMOL/L (ref 16–60)
AMPHETAMINE SCREEN, URINE: NOT DETECTED
ANION GAP SERPL CALCULATED.3IONS-SCNC: 13 MMOL/L (ref 7–16)
ANION GAP SERPL CALCULATED.3IONS-SCNC: 16 MMOL/L (ref 7–16)
ANION GAP SERPL CALCULATED.3IONS-SCNC: 16 MMOL/L (ref 7–16)
ANION GAP SERPL CALCULATED.3IONS-SCNC: 17 MMOL/L (ref 7–16)
ANION GAP SERPL CALCULATED.3IONS-SCNC: 18 MMOL/L (ref 7–16)
ANION GAP SERPL CALCULATED.3IONS-SCNC: 23 MMOL/L (ref 7–16)
ANISOCYTOSIS: ABNORMAL
APPEARANCE FLUID: CLEAR
AST SERPL-CCNC: 13 U/L (ref 0–39)
AST SERPL-CCNC: 14 U/L (ref 0–39)
AST SERPL-CCNC: 17 U/L (ref 0–39)
AST SERPL-CCNC: 20 U/L (ref 0–39)
AST SERPL-CCNC: 25 U/L (ref 0–39)
BACTERIA: ABNORMAL /HPF
BARBITURATE SCREEN URINE: NOT DETECTED
BASOPHILS ABSOLUTE: 0.01 E9/L (ref 0–0.2)
BASOPHILS ABSOLUTE: 0.01 E9/L (ref 0–0.2)
BASOPHILS ABSOLUTE: 0.02 E9/L (ref 0–0.2)
BASOPHILS ABSOLUTE: 0.02 E9/L (ref 0–0.2)
BASOPHILS RELATIVE PERCENT: 0.1 % (ref 0–2)
BASOPHILS RELATIVE PERCENT: 0.1 % (ref 0–2)
BASOPHILS RELATIVE PERCENT: 0.2 % (ref 0–2)
BASOPHILS RELATIVE PERCENT: 0.2 % (ref 0–2)
BENZODIAZEPINE SCREEN, URINE: NOT DETECTED
BILIRUB SERPL-MCNC: 0.4 MG/DL (ref 0–1.2)
BILIRUB SERPL-MCNC: 0.4 MG/DL (ref 0–1.2)
BILIRUB SERPL-MCNC: 0.5 MG/DL (ref 0–1.2)
BILIRUB SERPL-MCNC: 0.6 MG/DL (ref 0–1.2)
BILIRUB SERPL-MCNC: 0.7 MG/DL (ref 0–1.2)
BILIRUBIN DIRECT: <0.2 MG/DL (ref 0–0.3)
BILIRUBIN DIRECT: <0.2 MG/DL (ref 0–0.3)
BILIRUBIN URINE: NEGATIVE
BILIRUBIN, INDIRECT: ABNORMAL MG/DL (ref 0–1)
BILIRUBIN, INDIRECT: ABNORMAL MG/DL (ref 0–1)
BLOOD CULTURE, ROUTINE: NORMAL
BLOOD, URINE: ABNORMAL
BODY FLUID CULTURE, STERILE: NORMAL
BUN BLDV-MCNC: 11 MG/DL (ref 8–23)
BUN BLDV-MCNC: 14 MG/DL (ref 8–23)
BUN BLDV-MCNC: 24 MG/DL (ref 8–23)
BUN BLDV-MCNC: 27 MG/DL (ref 8–23)
BUN BLDV-MCNC: 28 MG/DL (ref 8–23)
BUN BLDV-MCNC: 31 MG/DL (ref 8–23)
BURR CELLS: ABNORMAL
C DIFF TOXIN/ANTIGEN: NORMAL
CALCIUM SERPL-MCNC: 7.4 MG/DL (ref 8.6–10.2)
CALCIUM SERPL-MCNC: 8.3 MG/DL (ref 8.6–10.2)
CALCIUM SERPL-MCNC: 8.4 MG/DL (ref 8.6–10.2)
CALCIUM SERPL-MCNC: 8.4 MG/DL (ref 8.6–10.2)
CALCIUM SERPL-MCNC: 8.6 MG/DL (ref 8.6–10.2)
CALCIUM SERPL-MCNC: 9.4 MG/DL (ref 8.6–10.2)
CANNABINOID SCREEN URINE: NOT DETECTED
CELL COUNT FLUID TYPE: NORMAL
CHLORIDE BLD-SCNC: 104 MMOL/L (ref 98–107)
CHLORIDE BLD-SCNC: 105 MMOL/L (ref 98–107)
CHLORIDE BLD-SCNC: 106 MMOL/L (ref 98–107)
CHLORIDE BLD-SCNC: 95 MMOL/L (ref 98–107)
CHLORIDE BLD-SCNC: 95 MMOL/L (ref 98–107)
CHLORIDE BLD-SCNC: 97 MMOL/L (ref 98–107)
CLARITY: ABNORMAL
CLARITY: CLEAR
CLARITY: CLEAR
CO2: 13 MMOL/L (ref 22–29)
CO2: 18 MMOL/L (ref 22–29)
CO2: 19 MMOL/L (ref 22–29)
CO2: 21 MMOL/L (ref 22–29)
CO2: 21 MMOL/L (ref 22–29)
CO2: 22 MMOL/L (ref 22–29)
COCAINE METABOLITE SCREEN URINE: NOT DETECTED
COLOR FLUID: YELLOW
COLOR: YELLOW
CREAT SERPL-MCNC: 0.7 MG/DL (ref 0.7–1.2)
CREAT SERPL-MCNC: 0.9 MG/DL (ref 0.7–1.2)
CREAT SERPL-MCNC: 1.2 MG/DL (ref 0.7–1.2)
CREAT SERPL-MCNC: 1.2 MG/DL (ref 0.7–1.2)
CREAT SERPL-MCNC: 1.3 MG/DL (ref 0.7–1.2)
CREAT SERPL-MCNC: 1.3 MG/DL (ref 0.7–1.2)
CULTURE, BLOOD 2: NORMAL
EKG ATRIAL RATE: 122 BPM
EKG ATRIAL RATE: 81 BPM
EKG P AXIS: 29 DEGREES
EKG P AXIS: 41 DEGREES
EKG P-R INTERVAL: 140 MS
EKG P-R INTERVAL: 148 MS
EKG Q-T INTERVAL: 340 MS
EKG Q-T INTERVAL: 408 MS
EKG QRS DURATION: 100 MS
EKG QRS DURATION: 102 MS
EKG QTC CALCULATION (BAZETT): 473 MS
EKG QTC CALCULATION (BAZETT): 484 MS
EKG R AXIS: -12 DEGREES
EKG R AXIS: -6 DEGREES
EKG T AXIS: 21 DEGREES
EKG T AXIS: 75 DEGREES
EKG VENTRICULAR RATE: 122 BPM
EKG VENTRICULAR RATE: 81 BPM
EOSINOPHILS ABSOLUTE: 0 E9/L (ref 0.05–0.5)
EOSINOPHILS ABSOLUTE: 0.05 E9/L (ref 0.05–0.5)
EOSINOPHILS RELATIVE PERCENT: 0 % (ref 0–6)
EOSINOPHILS RELATIVE PERCENT: 0.5 % (ref 0–6)
FENTANYL SCREEN, URINE: POSITIVE
FERRITIN: 442 NG/ML
FLUID TYPE: NORMAL
GFR AFRICAN AMERICAN: >60
GFR NON-AFRICAN AMERICAN: 54 ML/MIN/1.73
GFR NON-AFRICAN AMERICAN: 54 ML/MIN/1.73
GFR NON-AFRICAN AMERICAN: 59 ML/MIN/1.73
GFR NON-AFRICAN AMERICAN: 59 ML/MIN/1.73
GFR NON-AFRICAN AMERICAN: >60 ML/MIN/1.73
GFR NON-AFRICAN AMERICAN: >60 ML/MIN/1.73
GLUCOSE BLD-MCNC: 123 MG/DL (ref 74–99)
GLUCOSE BLD-MCNC: 131 MG/DL (ref 74–99)
GLUCOSE BLD-MCNC: 139 MG/DL (ref 74–99)
GLUCOSE BLD-MCNC: 149 MG/DL (ref 74–99)
GLUCOSE BLD-MCNC: 152 MG/DL (ref 74–99)
GLUCOSE BLD-MCNC: 198 MG/DL (ref 74–99)
GLUCOSE URINE: NEGATIVE MG/DL
GLUCOSE, FLUID: 182 MG/DL
GRAM STAIN RESULT: NORMAL
HCT VFR BLD CALC: 29.6 % (ref 37–54)
HCT VFR BLD CALC: 31.3 % (ref 37–54)
HCT VFR BLD CALC: 34.3 % (ref 37–54)
HCT VFR BLD CALC: 35.2 % (ref 37–54)
HCT VFR BLD CALC: 35.4 % (ref 37–54)
HEMOGLOBIN: 10.7 G/DL (ref 12.5–16.5)
HEMOGLOBIN: 10.9 G/DL (ref 12.5–16.5)
HEMOGLOBIN: 11 G/DL (ref 12.5–16.5)
HEMOGLOBIN: 9.2 G/DL (ref 12.5–16.5)
HEMOGLOBIN: 9.7 G/DL (ref 12.5–16.5)
HYPOCHROMIA: ABNORMAL
IMMATURE GRANULOCYTES #: 0.06 E9/L
IMMATURE GRANULOCYTES #: 0.07 E9/L
IMMATURE GRANULOCYTES #: 0.17 E9/L
IMMATURE GRANULOCYTES #: 0.2 E9/L
IMMATURE GRANULOCYTES %: 0.5 % (ref 0–5)
IMMATURE GRANULOCYTES %: 0.7 % (ref 0–5)
IMMATURE GRANULOCYTES %: 1.6 % (ref 0–5)
IMMATURE GRANULOCYTES %: 1.8 % (ref 0–5)
INR BLD: 1.2
IRON: 31 MCG/DL (ref 59–158)
KETONES, URINE: 15 MG/DL
KETONES, URINE: ABNORMAL MG/DL
KETONES, URINE: NEGATIVE MG/DL
LACTATE DEHYDROGENASE: 290 U/L (ref 135–225)
LACTIC ACID, SEPSIS: 0.9 MMOL/L (ref 0.5–1.9)
LACTIC ACID, SEPSIS: 1.4 MMOL/L (ref 0.5–1.9)
LACTIC ACID: 1.3 MMOL/L (ref 0.5–2.2)
LD, FLUID: 225 U/L
LEUKOCYTE ESTERASE, URINE: ABNORMAL
LIPASE: 11 U/L (ref 13–60)
LYMPHOCYTES ABSOLUTE: 0.21 E9/L (ref 1.5–4)
LYMPHOCYTES ABSOLUTE: 0.28 E9/L (ref 1.5–4)
LYMPHOCYTES ABSOLUTE: 0.38 E9/L (ref 1.5–4)
LYMPHOCYTES ABSOLUTE: 0.53 E9/L (ref 1.5–4)
LYMPHOCYTES RELATIVE PERCENT: 2.2 % (ref 20–42)
LYMPHOCYTES RELATIVE PERCENT: 2.2 % (ref 20–42)
LYMPHOCYTES RELATIVE PERCENT: 3 % (ref 20–42)
LYMPHOCYTES RELATIVE PERCENT: 5.6 % (ref 20–42)
Lab: ABNORMAL
MAGNESIUM: 1.5 MG/DL (ref 1.6–2.6)
MCH RBC QN AUTO: 27.7 PG (ref 26–35)
MCH RBC QN AUTO: 28 PG (ref 26–35)
MCH RBC QN AUTO: 28.1 PG (ref 26–35)
MCH RBC QN AUTO: 28.4 PG (ref 26–35)
MCH RBC QN AUTO: 28.5 PG (ref 26–35)
MCHC RBC AUTO-ENTMCNC: 30.2 % (ref 32–34.5)
MCHC RBC AUTO-ENTMCNC: 31 % (ref 32–34.5)
MCHC RBC AUTO-ENTMCNC: 31.1 % (ref 32–34.5)
MCHC RBC AUTO-ENTMCNC: 31.3 % (ref 32–34.5)
MCHC RBC AUTO-ENTMCNC: 31.8 % (ref 32–34.5)
MCV RBC AUTO: 89.6 FL (ref 80–99.9)
MCV RBC AUTO: 90.2 FL (ref 80–99.9)
MCV RBC AUTO: 90.7 FL (ref 80–99.9)
MCV RBC AUTO: 90.7 FL (ref 80–99.9)
MCV RBC AUTO: 91.7 FL (ref 80–99.9)
METHADONE SCREEN, URINE: NOT DETECTED
MONOCYTE, FLUID: 90 %
MONOCYTES ABSOLUTE: 0.56 E9/L (ref 0.1–0.95)
MONOCYTES ABSOLUTE: 0.61 E9/L (ref 0.1–0.95)
MONOCYTES ABSOLUTE: 0.68 E9/L (ref 0.1–0.95)
MONOCYTES ABSOLUTE: 0.99 E9/L (ref 0.1–0.95)
MONOCYTES RELATIVE PERCENT: 5.4 % (ref 2–12)
MONOCYTES RELATIVE PERCENT: 5.9 % (ref 2–12)
MONOCYTES RELATIVE PERCENT: 6.4 % (ref 2–12)
MONOCYTES RELATIVE PERCENT: 7.6 % (ref 2–12)
NEUTROPHIL, FLUID: 10 %
NEUTROPHILS ABSOLUTE: 11.37 E9/L (ref 1.8–7.3)
NEUTROPHILS ABSOLUTE: 11.63 E9/L (ref 1.8–7.3)
NEUTROPHILS ABSOLUTE: 8.19 E9/L (ref 1.8–7.3)
NEUTROPHILS ABSOLUTE: 8.7 E9/L (ref 1.8–7.3)
NEUTROPHILS RELATIVE PERCENT: 86 % (ref 43–80)
NEUTROPHILS RELATIVE PERCENT: 89.6 % (ref 43–80)
NEUTROPHILS RELATIVE PERCENT: 89.8 % (ref 43–80)
NEUTROPHILS RELATIVE PERCENT: 90.6 % (ref 43–80)
NITRITE, URINE: POSITIVE
NUCLEATED CELLS FLUID: 153 /UL
OPIATE SCREEN URINE: NOT DETECTED
ORGANISM: ABNORMAL
ORGANISM: ABNORMAL
OVALOCYTES: ABNORMAL
OXYCODONE URINE: NOT DETECTED
PDW BLD-RTO: 17.1 FL (ref 11.5–15)
PDW BLD-RTO: 17.7 FL (ref 11.5–15)
PDW BLD-RTO: 17.9 FL (ref 11.5–15)
PDW BLD-RTO: 17.9 FL (ref 11.5–15)
PDW BLD-RTO: 18.2 FL (ref 11.5–15)
PH UA: 5.5 (ref 5–9)
PH UA: 5.5 (ref 5–9)
PH UA: 6 (ref 5–9)
PHENCYCLIDINE SCREEN URINE: NOT DETECTED
PLATELET # BLD: 249 E9/L (ref 130–450)
PLATELET # BLD: 254 E9/L (ref 130–450)
PLATELET # BLD: 295 E9/L (ref 130–450)
PLATELET # BLD: 341 E9/L (ref 130–450)
PLATELET # BLD: 358 E9/L (ref 130–450)
PMV BLD AUTO: 8.4 FL (ref 7–12)
PMV BLD AUTO: 8.5 FL (ref 7–12)
PMV BLD AUTO: 8.6 FL (ref 7–12)
PMV BLD AUTO: 8.8 FL (ref 7–12)
PMV BLD AUTO: 9 FL (ref 7–12)
POIKILOCYTES: ABNORMAL
POLYCHROMASIA: ABNORMAL
POTASSIUM REFLEX MAGNESIUM: 3.2 MMOL/L (ref 3.5–5)
POTASSIUM REFLEX MAGNESIUM: 4.5 MMOL/L (ref 3.5–5)
POTASSIUM SERPL-SCNC: 3.5 MMOL/L (ref 3.5–5)
POTASSIUM SERPL-SCNC: 3.6 MMOL/L (ref 3.5–5)
POTASSIUM SERPL-SCNC: 3.6 MMOL/L (ref 3.5–5)
POTASSIUM SERPL-SCNC: 4 MMOL/L (ref 3.5–5)
PRO-BNP: 3947 PG/ML (ref 0–450)
PRO-BNP: 954 PG/ML (ref 0–450)
PROCALCITONIN: >100 NG/ML (ref 0–0.08)
PROCALCITONIN: >100 NG/ML (ref 0–0.08)
PROTEIN FLUID: 3.3 G/DL
PROTEIN UA: 100 MG/DL
PROTEIN UA: 100 MG/DL
PROTEIN UA: 30 MG/DL
PROTHROMBIN TIME: 13.9 SEC (ref 9.3–12.4)
RBC # BLD: 3.28 E12/L (ref 3.8–5.8)
RBC # BLD: 3.45 E12/L (ref 3.8–5.8)
RBC # BLD: 3.83 E12/L (ref 3.8–5.8)
RBC # BLD: 3.86 E12/L (ref 3.8–5.8)
RBC # BLD: 3.88 E12/L (ref 3.8–5.8)
RBC FLUID: <2000 /UL
RBC UA: >20 /HPF (ref 0–2)
RBC UA: ABNORMAL /HPF (ref 0–2)
RBC UA: ABNORMAL /HPF (ref 0–2)
SCHISTOCYTES: ABNORMAL
SODIUM BLD-SCNC: 133 MMOL/L (ref 132–146)
SODIUM BLD-SCNC: 134 MMOL/L (ref 132–146)
SODIUM BLD-SCNC: 135 MMOL/L (ref 132–146)
SODIUM BLD-SCNC: 138 MMOL/L (ref 132–146)
SODIUM BLD-SCNC: 139 MMOL/L (ref 132–146)
SODIUM BLD-SCNC: 140 MMOL/L (ref 132–146)
SPECIFIC GRAVITY UA: 1.02 (ref 1–1.03)
TEAR DROP CELLS: ABNORMAL
TEAR DROP CELLS: ABNORMAL
TOTAL PROTEIN: 5.5 G/DL (ref 6.4–8.3)
TOTAL PROTEIN: 5.7 G/DL (ref 6.4–8.3)
TOTAL PROTEIN: 5.8 G/DL (ref 6.4–8.3)
TOTAL PROTEIN: 5.8 G/DL (ref 6.4–8.3)
TOTAL PROTEIN: 5.9 G/DL (ref 6.4–8.3)
TOTAL PROTEIN: 6.3 G/DL (ref 6.4–8.3)
TROPONIN: <0.01 NG/ML (ref 0–0.03)
TROPONIN: <0.01 NG/ML (ref 0–0.03)
URINE CULTURE, ROUTINE: ABNORMAL
URINE CULTURE, ROUTINE: ABNORMAL
UROBILINOGEN, URINE: 0.2 E.U./DL
VANCOMYCIN TROUGH: 15.3 MCG/ML (ref 5–16)
VANCOMYCIN TROUGH: 28.7 MCG/ML (ref 5–16)
WBC # BLD: 12.7 E9/L (ref 4.5–11.5)
WBC # BLD: 13 E9/L (ref 4.5–11.5)
WBC # BLD: 8.6 E9/L (ref 4.5–11.5)
WBC # BLD: 9.5 E9/L (ref 4.5–11.5)
WBC # BLD: 9.6 E9/L (ref 4.5–11.5)
WBC UA: >20 /HPF (ref 0–5)
WBC UA: >20 /HPF (ref 0–5)
WBC UA: ABNORMAL /HPF (ref 0–5)

## 2020-01-01 PROCEDURE — 84145 PROCALCITONIN (PCT): CPT

## 2020-01-01 PROCEDURE — 96374 THER/PROPH/DIAG INJ IV PUSH: CPT

## 2020-01-01 PROCEDURE — 6370000000 HC RX 637 (ALT 250 FOR IP): Performed by: INTERNAL MEDICINE

## 2020-01-01 PROCEDURE — 85610 PROTHROMBIN TIME: CPT

## 2020-01-01 PROCEDURE — 83540 ASSAY OF IRON: CPT

## 2020-01-01 PROCEDURE — 6360000002 HC RX W HCPCS: Performed by: INTERNAL MEDICINE

## 2020-01-01 PROCEDURE — 96375 TX/PRO/DX INJ NEW DRUG ADDON: CPT

## 2020-01-01 PROCEDURE — 83880 ASSAY OF NATRIURETIC PEPTIDE: CPT

## 2020-01-01 PROCEDURE — 2580000003 HC RX 258: Performed by: ANESTHESIOLOGY

## 2020-01-01 PROCEDURE — 80076 HEPATIC FUNCTION PANEL: CPT

## 2020-01-01 PROCEDURE — 36415 COLL VENOUS BLD VENIPUNCTURE: CPT

## 2020-01-01 PROCEDURE — 6370000000 HC RX 637 (ALT 250 FOR IP): Performed by: NURSE PRACTITIONER

## 2020-01-01 PROCEDURE — 83690 ASSAY OF LIPASE: CPT

## 2020-01-01 PROCEDURE — 84484 ASSAY OF TROPONIN QUANT: CPT

## 2020-01-01 PROCEDURE — 82947 ASSAY GLUCOSE BLOOD QUANT: CPT

## 2020-01-01 PROCEDURE — 3700000000 HC ANESTHESIA ATTENDED CARE: Performed by: INTERNAL MEDICINE

## 2020-01-01 PROCEDURE — 81001 URINALYSIS AUTO W/SCOPE: CPT

## 2020-01-01 PROCEDURE — 2580000003 HC RX 258: Performed by: INTERNAL MEDICINE

## 2020-01-01 PROCEDURE — 2700000000 HC OXYGEN THERAPY PER DAY

## 2020-01-01 PROCEDURE — 6370000000 HC RX 637 (ALT 250 FOR IP): Performed by: FAMILY MEDICINE

## 2020-01-01 PROCEDURE — 87088 URINE BACTERIA CULTURE: CPT

## 2020-01-01 PROCEDURE — 97530 THERAPEUTIC ACTIVITIES: CPT

## 2020-01-01 PROCEDURE — 94640 AIRWAY INHALATION TREATMENT: CPT

## 2020-01-01 PROCEDURE — 6360000002 HC RX W HCPCS: Performed by: EMERGENCY MEDICINE

## 2020-01-01 PROCEDURE — 87324 CLOSTRIDIUM AG IA: CPT

## 2020-01-01 PROCEDURE — 87449 NOS EACH ORGANISM AG IA: CPT

## 2020-01-01 PROCEDURE — 99285 EMERGENCY DEPT VISIT HI MDM: CPT

## 2020-01-01 PROCEDURE — 87070 CULTURE OTHR SPECIMN AEROBIC: CPT

## 2020-01-01 PROCEDURE — 80053 COMPREHEN METABOLIC PANEL: CPT

## 2020-01-01 PROCEDURE — 82140 ASSAY OF AMMONIA: CPT

## 2020-01-01 PROCEDURE — 93971 EXTREMITY STUDY: CPT

## 2020-01-01 PROCEDURE — 94660 CPAP INITIATION&MGMT: CPT

## 2020-01-01 PROCEDURE — 83605 ASSAY OF LACTIC ACID: CPT

## 2020-01-01 PROCEDURE — 2580000003 HC RX 258: Performed by: FAMILY MEDICINE

## 2020-01-01 PROCEDURE — G0378 HOSPITAL OBSERVATION PER HR: HCPCS

## 2020-01-01 PROCEDURE — 94664 DEMO&/EVAL PT USE INHALER: CPT

## 2020-01-01 PROCEDURE — 71045 X-RAY EXAM CHEST 1 VIEW: CPT

## 2020-01-01 PROCEDURE — 6360000002 HC RX W HCPCS: Performed by: FAMILY MEDICINE

## 2020-01-01 PROCEDURE — 1200000000 HC SEMI PRIVATE

## 2020-01-01 PROCEDURE — 71275 CT ANGIOGRAPHY CHEST: CPT

## 2020-01-01 PROCEDURE — 87077 CULTURE AEROBIC IDENTIFY: CPT

## 2020-01-01 PROCEDURE — 7100000011 HC PHASE II RECOVERY - ADDTL 15 MIN: Performed by: INTERNAL MEDICINE

## 2020-01-01 PROCEDURE — 70450 CT HEAD/BRAIN W/O DYE: CPT

## 2020-01-01 PROCEDURE — 80048 BASIC METABOLIC PNL TOTAL CA: CPT

## 2020-01-01 PROCEDURE — 99231 SBSQ HOSP IP/OBS SF/LOW 25: CPT | Performed by: NURSE PRACTITIONER

## 2020-01-01 PROCEDURE — 80307 DRUG TEST PRSMV CHEM ANLYZR: CPT

## 2020-01-01 PROCEDURE — 96365 THER/PROPH/DIAG IV INF INIT: CPT

## 2020-01-01 PROCEDURE — 87040 BLOOD CULTURE FOR BACTERIA: CPT

## 2020-01-01 PROCEDURE — 87186 SC STD MICRODIL/AGAR DIL: CPT

## 2020-01-01 PROCEDURE — 02HV33Z INSERTION OF INFUSION DEVICE INTO SUPERIOR VENA CAVA, PERCUTANEOUS APPROACH: ICD-10-PCS | Performed by: FAMILY MEDICINE

## 2020-01-01 PROCEDURE — 89051 BODY FLUID CELL COUNT: CPT

## 2020-01-01 PROCEDURE — 80202 ASSAY OF VANCOMYCIN: CPT

## 2020-01-01 PROCEDURE — 83615 LACTATE (LD) (LDH) ENZYME: CPT

## 2020-01-01 PROCEDURE — 88112 CYTOPATH CELL ENHANCE TECH: CPT

## 2020-01-01 PROCEDURE — 6360000004 HC RX CONTRAST MEDICATION: Performed by: RADIOLOGY

## 2020-01-01 PROCEDURE — 6370000000 HC RX 637 (ALT 250 FOR IP): Performed by: STUDENT IN AN ORGANIZED HEALTH CARE EDUCATION/TRAINING PROGRAM

## 2020-01-01 PROCEDURE — 3609009900 HC COLONOSCOPY W/CONTROL BLEEDING ANY METHOD: Performed by: INTERNAL MEDICINE

## 2020-01-01 PROCEDURE — 93005 ELECTROCARDIOGRAM TRACING: CPT | Performed by: PHYSICIAN ASSISTANT

## 2020-01-01 PROCEDURE — 96361 HYDRATE IV INFUSION ADD-ON: CPT

## 2020-01-01 PROCEDURE — 97166 OT EVAL MOD COMPLEX 45 MIN: CPT

## 2020-01-01 PROCEDURE — 85025 COMPLETE CBC W/AUTO DIFF WBC: CPT

## 2020-01-01 PROCEDURE — 99232 SBSQ HOSP IP/OBS MODERATE 35: CPT | Performed by: NURSE PRACTITIONER

## 2020-01-01 PROCEDURE — 0W993ZZ DRAINAGE OF RIGHT PLEURAL CAVITY, PERCUTANEOUS APPROACH: ICD-10-PCS | Performed by: INTERNAL MEDICINE

## 2020-01-01 PROCEDURE — 97530 THERAPEUTIC ACTIVITIES: CPT | Performed by: PHYSICAL THERAPIST

## 2020-01-01 PROCEDURE — 96367 TX/PROPH/DG ADDL SEQ IV INF: CPT

## 2020-01-01 PROCEDURE — 74177 CT ABD & PELVIS W/CONTRAST: CPT

## 2020-01-01 PROCEDURE — 32555 ASPIRATE PLEURA W/ IMAGING: CPT

## 2020-01-01 PROCEDURE — 97535 SELF CARE MNGMENT TRAINING: CPT

## 2020-01-01 PROCEDURE — 99291 CRITICAL CARE FIRST HOUR: CPT

## 2020-01-01 PROCEDURE — C1751 CATH, INF, PER/CENT/MIDLINE: HCPCS

## 2020-01-01 PROCEDURE — 7100000010 HC PHASE II RECOVERY - FIRST 15 MIN: Performed by: INTERNAL MEDICINE

## 2020-01-01 PROCEDURE — 84155 ASSAY OF PROTEIN SERUM: CPT

## 2020-01-01 PROCEDURE — 96366 THER/PROPH/DIAG IV INF ADDON: CPT

## 2020-01-01 PROCEDURE — 3700000001 HC ADD 15 MINUTES (ANESTHESIA): Performed by: INTERNAL MEDICINE

## 2020-01-01 PROCEDURE — 94760 N-INVAS EAR/PLS OXIMETRY 1: CPT

## 2020-01-01 PROCEDURE — 2580000003 HC RX 258: Performed by: RADIOLOGY

## 2020-01-01 PROCEDURE — 94761 N-INVAS EAR/PLS OXIMETRY MLT: CPT

## 2020-01-01 PROCEDURE — 83735 ASSAY OF MAGNESIUM: CPT

## 2020-01-01 PROCEDURE — 93010 ELECTROCARDIOGRAM REPORT: CPT | Performed by: INTERNAL MEDICINE

## 2020-01-01 PROCEDURE — 97162 PT EVAL MOD COMPLEX 30 MIN: CPT

## 2020-01-01 PROCEDURE — 84157 ASSAY OF PROTEIN OTHER: CPT

## 2020-01-01 PROCEDURE — 3609008400 HC SIGMOIDOSCOPY DIAGNOSTIC: Performed by: INTERNAL MEDICINE

## 2020-01-01 PROCEDURE — 36410 VNPNXR 3YR/> PHY/QHP DX/THER: CPT

## 2020-01-01 PROCEDURE — 93005 ELECTROCARDIOGRAM TRACING: CPT | Performed by: STUDENT IN AN ORGANIZED HEALTH CARE EDUCATION/TRAINING PROGRAM

## 2020-01-01 PROCEDURE — 2709999900 HC NON-CHARGEABLE SUPPLY: Performed by: INTERNAL MEDICINE

## 2020-01-01 PROCEDURE — 6360000002 HC RX W HCPCS: Performed by: NURSE ANESTHETIST, CERTIFIED REGISTERED

## 2020-01-01 PROCEDURE — 2580000003 HC RX 258: Performed by: STUDENT IN AN ORGANIZED HEALTH CARE EDUCATION/TRAINING PROGRAM

## 2020-01-01 PROCEDURE — 99223 1ST HOSP IP/OBS HIGH 75: CPT | Performed by: NURSE PRACTITIONER

## 2020-01-01 PROCEDURE — 96376 TX/PRO/DX INJ SAME DRUG ADON: CPT

## 2020-01-01 PROCEDURE — 88305 TISSUE EXAM BY PATHOLOGIST: CPT

## 2020-01-01 PROCEDURE — 2580000003 HC RX 258: Performed by: EMERGENCY MEDICINE

## 2020-01-01 PROCEDURE — 87205 SMEAR GRAM STAIN: CPT

## 2020-01-01 PROCEDURE — 2720000010 HC SURG SUPPLY STERILE: Performed by: INTERNAL MEDICINE

## 2020-01-01 PROCEDURE — 82728 ASSAY OF FERRITIN: CPT

## 2020-01-01 PROCEDURE — 85027 COMPLETE CBC AUTOMATED: CPT

## 2020-01-01 PROCEDURE — 2580000003 HC RX 258: Performed by: PHYSICIAN ASSISTANT

## 2020-01-01 PROCEDURE — 2500000003 HC RX 250 WO HCPCS: Performed by: FAMILY MEDICINE

## 2020-01-01 RX ORDER — FENTANYL 25 UG/H
1 PATCH TRANSDERMAL
Status: ON HOLD | COMMUNITY
End: 2020-01-01 | Stop reason: HOSPADM

## 2020-01-01 RX ORDER — POTASSIUM CHLORIDE 20 MEQ/1
20 TABLET, EXTENDED RELEASE ORAL 2 TIMES DAILY WITH MEALS
Status: DISCONTINUED | OUTPATIENT
Start: 2020-01-01 | End: 2020-01-01 | Stop reason: HOSPADM

## 2020-01-01 RX ORDER — DEXTROSE MONOHYDRATE 50 MG/ML
100 INJECTION, SOLUTION INTRAVENOUS PRN
Status: DISCONTINUED | OUTPATIENT
Start: 2020-01-01 | End: 2020-01-01 | Stop reason: HOSPADM

## 2020-01-01 RX ORDER — KETOROLAC TROMETHAMINE 30 MG/ML
15 INJECTION, SOLUTION INTRAMUSCULAR; INTRAVENOUS ONCE
Status: COMPLETED | OUTPATIENT
Start: 2020-01-01 | End: 2020-01-01

## 2020-01-01 RX ORDER — HYDROXYZINE PAMOATE 25 MG/1
25 CAPSULE ORAL 3 TIMES DAILY PRN
Status: DISCONTINUED | OUTPATIENT
Start: 2020-01-01 | End: 2020-01-01 | Stop reason: HOSPADM

## 2020-01-01 RX ORDER — GLUCOSAMINE HCL 500 MG
3000 TABLET ORAL 2 TIMES DAILY
COMMUNITY
End: 2020-01-01

## 2020-01-01 RX ORDER — FENTANYL CITRATE 50 UG/ML
25 INJECTION, SOLUTION INTRAMUSCULAR; INTRAVENOUS
Status: DISCONTINUED | OUTPATIENT
Start: 2020-01-01 | End: 2020-01-01 | Stop reason: HOSPADM

## 2020-01-01 RX ORDER — AMLODIPINE BESYLATE 5 MG/1
5 TABLET ORAL DAILY
Status: ON HOLD | COMMUNITY
End: 2020-01-01 | Stop reason: HOSPADM

## 2020-01-01 RX ORDER — ACETAMINOPHEN 325 MG/1
650 TABLET ORAL EVERY 6 HOURS PRN
Status: DISCONTINUED | OUTPATIENT
Start: 2020-01-01 | End: 2020-01-01 | Stop reason: HOSPADM

## 2020-01-01 RX ORDER — SODIUM CHLORIDE 0.9 % (FLUSH) 0.9 %
10 SYRINGE (ML) INJECTION PRN
Status: DISCONTINUED | OUTPATIENT
Start: 2020-01-01 | End: 2020-01-01 | Stop reason: HOSPADM

## 2020-01-01 RX ORDER — POTASSIUM CHLORIDE 20 MEQ/1
20 TABLET, EXTENDED RELEASE ORAL 2 TIMES DAILY WITH MEALS
Qty: 60 TABLET | Refills: 3 | Status: SHIPPED | OUTPATIENT
Start: 2020-01-01 | End: 2020-01-01

## 2020-01-01 RX ORDER — OXYBUTYNIN CHLORIDE 10 MG/1
10 TABLET, EXTENDED RELEASE ORAL DAILY PRN
Status: DISCONTINUED | OUTPATIENT
Start: 2020-01-01 | End: 2020-01-01 | Stop reason: HOSPADM

## 2020-01-01 RX ORDER — POLYETHYLENE GLYCOL 3350 17 G/17G
17 POWDER, FOR SOLUTION ORAL DAILY PRN
Qty: 527 G | Refills: 1 | Status: SHIPPED | OUTPATIENT
Start: 2020-01-01 | End: 2020-01-01

## 2020-01-01 RX ORDER — FENTANYL 25 UG/H
1 PATCH TRANSDERMAL
Qty: 5 PATCH | Refills: 0 | Status: SHIPPED | OUTPATIENT
Start: 2020-01-01 | End: 2020-04-07

## 2020-01-01 RX ORDER — HEPARIN SODIUM (PORCINE) LOCK FLUSH IV SOLN 100 UNIT/ML 100 UNIT/ML
1 SOLUTION INTRAVENOUS PRN
Status: DISCONTINUED | OUTPATIENT
Start: 2020-01-01 | End: 2020-01-01 | Stop reason: HOSPADM

## 2020-01-01 RX ORDER — AMIODARONE HYDROCHLORIDE 200 MG/1
200 TABLET ORAL DAILY
Status: DISCONTINUED | OUTPATIENT
Start: 2020-01-01 | End: 2020-01-01 | Stop reason: HOSPADM

## 2020-01-01 RX ORDER — NICOTINE POLACRILEX 4 MG
15 LOZENGE BUCCAL PRN
Status: DISCONTINUED | OUTPATIENT
Start: 2020-01-01 | End: 2020-01-01 | Stop reason: HOSPADM

## 2020-01-01 RX ORDER — HYDROCODONE BITARTRATE AND ACETAMINOPHEN 5; 325 MG/1; MG/1
1 TABLET ORAL EVERY 6 HOURS PRN
Status: DISCONTINUED | OUTPATIENT
Start: 2020-01-01 | End: 2020-01-01

## 2020-01-01 RX ORDER — HYDRALAZINE HYDROCHLORIDE 20 MG/ML
5 INJECTION INTRAMUSCULAR; INTRAVENOUS EVERY 6 HOURS PRN
Status: DISCONTINUED | OUTPATIENT
Start: 2020-01-01 | End: 2020-01-01

## 2020-01-01 RX ORDER — PROPOFOL 10 MG/ML
INJECTION, EMULSION INTRAVENOUS PRN
Status: DISCONTINUED | OUTPATIENT
Start: 2020-01-01 | End: 2020-01-01 | Stop reason: SDUPTHER

## 2020-01-01 RX ORDER — SODIUM CHLORIDE 9 MG/ML
INJECTION, SOLUTION INTRAVENOUS CONTINUOUS
Status: DISCONTINUED | OUTPATIENT
Start: 2020-01-01 | End: 2020-01-01 | Stop reason: HOSPADM

## 2020-01-01 RX ORDER — HEPARIN SODIUM (PORCINE) LOCK FLUSH IV SOLN 100 UNIT/ML 100 UNIT/ML
1 SOLUTION INTRAVENOUS EVERY 12 HOURS SCHEDULED
Status: DISCONTINUED | OUTPATIENT
Start: 2020-01-01 | End: 2020-01-01 | Stop reason: HOSPADM

## 2020-01-01 RX ORDER — LIDOCAINE 4 G/G
1 PATCH TOPICAL DAILY
Status: DISCONTINUED | OUTPATIENT
Start: 2020-01-01 | End: 2020-01-01 | Stop reason: HOSPADM

## 2020-01-01 RX ORDER — SODIUM CHLORIDE 9 MG/ML
INJECTION, SOLUTION INTRAVENOUS CONTINUOUS
Status: DISCONTINUED | OUTPATIENT
Start: 2020-01-01 | End: 2020-01-01

## 2020-01-01 RX ORDER — FENTANYL 12 UG/H
1 PATCH TRANSDERMAL
Status: DISCONTINUED | OUTPATIENT
Start: 2020-01-01 | End: 2020-01-01 | Stop reason: HOSPADM

## 2020-01-01 RX ORDER — FUROSEMIDE 10 MG/ML
20 INJECTION INTRAMUSCULAR; INTRAVENOUS ONCE
Status: COMPLETED | OUTPATIENT
Start: 2020-01-01 | End: 2020-01-01

## 2020-01-01 RX ORDER — MORPHINE SULFATE 2 MG/ML
2 INJECTION, SOLUTION INTRAMUSCULAR; INTRAVENOUS EVERY 4 HOURS PRN
Status: DISCONTINUED | OUTPATIENT
Start: 2020-01-01 | End: 2020-01-01 | Stop reason: HOSPADM

## 2020-01-01 RX ORDER — OYSTER SHELL CALCIUM WITH VITAMIN D 500; 200 MG/1; [IU]/1
1 TABLET, FILM COATED ORAL 2 TIMES DAILY
COMMUNITY
End: 2020-01-01

## 2020-01-01 RX ORDER — ONDANSETRON 2 MG/ML
4 INJECTION INTRAMUSCULAR; INTRAVENOUS EVERY 6 HOURS PRN
Status: DISCONTINUED | OUTPATIENT
Start: 2020-01-01 | End: 2020-01-01 | Stop reason: HOSPADM

## 2020-01-01 RX ORDER — DEXTROSE MONOHYDRATE 25 G/50ML
12.5 INJECTION, SOLUTION INTRAVENOUS PRN
Status: DISCONTINUED | OUTPATIENT
Start: 2020-01-01 | End: 2020-01-01 | Stop reason: HOSPADM

## 2020-01-01 RX ORDER — MORPHINE SULFATE 20 MG/ML
2.5 SOLUTION ORAL
Qty: 30 ML | Refills: 0 | Status: SHIPPED | OUTPATIENT
Start: 2020-01-01 | End: 2020-04-20

## 2020-01-01 RX ORDER — POTASSIUM CHLORIDE 20 MEQ/1
20 TABLET, EXTENDED RELEASE ORAL DAILY
Status: ON HOLD | COMMUNITY
End: 2020-01-01 | Stop reason: HOSPADM

## 2020-01-01 RX ORDER — ACETAMINOPHEN 650 MG/1
650 SUPPOSITORY RECTAL EVERY 6 HOURS PRN
Status: DISCONTINUED | OUTPATIENT
Start: 2020-01-01 | End: 2020-01-01 | Stop reason: HOSPADM

## 2020-01-01 RX ORDER — LABETALOL HYDROCHLORIDE 5 MG/ML
5 INJECTION, SOLUTION INTRAVENOUS EVERY 4 HOURS PRN
Status: DISCONTINUED | OUTPATIENT
Start: 2020-01-01 | End: 2020-01-01 | Stop reason: HOSPADM

## 2020-01-01 RX ORDER — PROPOFOL 10 MG/ML
INJECTION, EMULSION INTRAVENOUS CONTINUOUS PRN
Status: DISCONTINUED | OUTPATIENT
Start: 2020-01-01 | End: 2020-01-01 | Stop reason: SDUPTHER

## 2020-01-01 RX ORDER — POLYETHYLENE GLYCOL 3350 17 G/17G
17 POWDER, FOR SOLUTION ORAL DAILY PRN
Status: DISCONTINUED | OUTPATIENT
Start: 2020-01-01 | End: 2020-01-01 | Stop reason: HOSPADM

## 2020-01-01 RX ORDER — SODIUM CHLORIDE 9 MG/ML
500 INJECTION, SOLUTION INTRAVENOUS CONTINUOUS
Status: DISCONTINUED | OUTPATIENT
Start: 2020-01-01 | End: 2020-01-01

## 2020-01-01 RX ORDER — FENTANYL 25 UG/H
1 PATCH TRANSDERMAL
Status: DISCONTINUED | OUTPATIENT
Start: 2020-01-01 | End: 2020-01-01 | Stop reason: HOSPADM

## 2020-01-01 RX ORDER — M-VIT,TX,IRON,MINS/CALC/FOLIC 27MG-0.4MG
1 TABLET ORAL DAILY
COMMUNITY
End: 2020-01-01

## 2020-01-01 RX ORDER — SODIUM CHLORIDE 0.9 % (FLUSH) 0.9 %
10 SYRINGE (ML) INJECTION EVERY 12 HOURS SCHEDULED
Status: DISCONTINUED | OUTPATIENT
Start: 2020-01-01 | End: 2020-01-01 | Stop reason: HOSPADM

## 2020-01-01 RX ORDER — FENTANYL 12 UG/H
1 PATCH TRANSDERMAL
Qty: 5 PATCH | Refills: 0 | Status: SHIPPED | OUTPATIENT
Start: 2020-01-01 | End: 2020-01-01

## 2020-01-01 RX ORDER — LORAZEPAM 0.5 MG/1
0.5 TABLET ORAL EVERY 4 HOURS PRN
Qty: 20 TABLET | Refills: 0 | Status: SHIPPED | OUTPATIENT
Start: 2020-01-01 | End: 2020-04-30

## 2020-01-01 RX ORDER — MONTELUKAST SODIUM 10 MG/1
10 TABLET ORAL NIGHTLY
Status: DISCONTINUED | OUTPATIENT
Start: 2020-01-01 | End: 2020-01-01 | Stop reason: HOSPADM

## 2020-01-01 RX ORDER — ACETAMINOPHEN 325 MG/1
650 TABLET ORAL EVERY 4 HOURS PRN
Status: DISCONTINUED | OUTPATIENT
Start: 2020-01-01 | End: 2020-01-01 | Stop reason: SDUPTHER

## 2020-01-01 RX ORDER — CALCIUM CARBONATE 500(1250)
1 TABLET ORAL 2 TIMES DAILY
Status: DISCONTINUED | OUTPATIENT
Start: 2020-01-01 | End: 2020-01-01 | Stop reason: HOSPADM

## 2020-01-01 RX ORDER — 0.9 % SODIUM CHLORIDE 0.9 %
1000 INTRAVENOUS SOLUTION INTRAVENOUS ONCE
Status: COMPLETED | OUTPATIENT
Start: 2020-01-01 | End: 2020-01-01

## 2020-01-01 RX ORDER — SODIUM CHLORIDE, SODIUM LACTATE, POTASSIUM CHLORIDE, CALCIUM CHLORIDE 600; 310; 30; 20 MG/100ML; MG/100ML; MG/100ML; MG/100ML
INJECTION, SOLUTION INTRAVENOUS CONTINUOUS
Status: DISCONTINUED | OUTPATIENT
Start: 2020-01-01 | End: 2020-01-01 | Stop reason: HOSPADM

## 2020-01-01 RX ORDER — METOPROLOL TARTRATE 50 MG/1
100 TABLET, FILM COATED ORAL 2 TIMES DAILY
Status: DISCONTINUED | OUTPATIENT
Start: 2020-01-01 | End: 2020-01-01 | Stop reason: HOSPADM

## 2020-01-01 RX ORDER — MORPHINE SULFATE 20 MG/ML
2.5 SOLUTION ORAL
Status: DISCONTINUED | OUTPATIENT
Start: 2020-01-01 | End: 2020-01-01 | Stop reason: HOSPADM

## 2020-01-01 RX ORDER — POTASSIUM CHLORIDE 20 MEQ/1
20 TABLET, EXTENDED RELEASE ORAL DAILY
Status: DISCONTINUED | OUTPATIENT
Start: 2020-01-01 | End: 2020-01-01 | Stop reason: HOSPADM

## 2020-01-01 RX ORDER — AMLODIPINE BESYLATE 5 MG/1
5 TABLET ORAL DAILY
Status: DISCONTINUED | OUTPATIENT
Start: 2020-01-01 | End: 2020-01-01 | Stop reason: HOSPADM

## 2020-01-01 RX ORDER — ACETAMINOPHEN 325 MG/1
650 TABLET ORAL EVERY 4 HOURS PRN
Status: ON HOLD | COMMUNITY
End: 2020-01-01 | Stop reason: HOSPADM

## 2020-01-01 RX ORDER — DOXYCYCLINE HYCLATE 100 MG
100 TABLET ORAL 2 TIMES DAILY
Status: ON HOLD | COMMUNITY
Start: 2020-01-01 | End: 2020-01-01 | Stop reason: HOSPADM

## 2020-01-01 RX ORDER — PHENOL 1.4 %
1 AEROSOL, SPRAY (ML) MUCOUS MEMBRANE 2 TIMES DAILY
Status: ON HOLD | COMMUNITY
End: 2020-01-01 | Stop reason: HOSPADM

## 2020-01-01 RX ORDER — ASCORBIC ACID 500 MG
500 TABLET ORAL DAILY
Status: DISCONTINUED | OUTPATIENT
Start: 2020-01-01 | End: 2020-01-01 | Stop reason: HOSPADM

## 2020-01-01 RX ORDER — SODIUM CHLORIDE 0.9 % (FLUSH) 0.9 %
10 SYRINGE (ML) INJECTION
Status: COMPLETED | OUTPATIENT
Start: 2020-01-01 | End: 2020-01-01

## 2020-01-01 RX ORDER — ONDANSETRON 4 MG/1
4 TABLET, FILM COATED ORAL EVERY 8 HOURS PRN
Qty: 90 TABLET | Refills: 0 | Status: SHIPPED | OUTPATIENT
Start: 2020-01-01

## 2020-01-01 RX ORDER — CHLORAL HYDRATE 500 MG
3000 CAPSULE ORAL DAILY
Status: ON HOLD | COMMUNITY
End: 2020-01-01 | Stop reason: HOSPADM

## 2020-01-01 RX ORDER — MIRTAZAPINE 15 MG/1
15 TABLET, FILM COATED ORAL NIGHTLY
COMMUNITY

## 2020-01-01 RX ORDER — FENTANYL 25 UG/H
1 PATCH TRANSDERMAL
Qty: 5 PATCH | Refills: 0 | Status: ON HOLD | OUTPATIENT
Start: 2020-01-01 | End: 2020-01-01 | Stop reason: SDUPTHER

## 2020-01-01 RX ORDER — METOPROLOL TARTRATE 50 MG/1
50 TABLET, FILM COATED ORAL 2 TIMES DAILY
Status: DISCONTINUED | OUTPATIENT
Start: 2020-01-01 | End: 2020-01-01 | Stop reason: HOSPADM

## 2020-01-01 RX ORDER — IBUPROFEN 400 MG/1
400 TABLET ORAL ONCE
Status: COMPLETED | OUTPATIENT
Start: 2020-01-01 | End: 2020-01-01

## 2020-01-01 RX ORDER — LIDOCAINE 4 G/G
1 PATCH TOPICAL DAILY
Qty: 30 PATCH | Refills: 1 | Status: SHIPPED | OUTPATIENT
Start: 2020-01-01 | End: 2020-01-01

## 2020-01-01 RX ORDER — PROMETHAZINE HYDROCHLORIDE 25 MG/1
12.5 TABLET ORAL EVERY 6 HOURS PRN
Status: DISCONTINUED | OUTPATIENT
Start: 2020-01-01 | End: 2020-01-01 | Stop reason: HOSPADM

## 2020-01-01 RX ORDER — LORAZEPAM 0.5 MG/1
0.5 TABLET ORAL EVERY 4 HOURS PRN
Status: DISCONTINUED | OUTPATIENT
Start: 2020-01-01 | End: 2020-01-01 | Stop reason: HOSPADM

## 2020-01-01 RX ORDER — FENTANYL 25 UG/H
1 PATCH TRANSDERMAL
Status: DISCONTINUED | OUTPATIENT
Start: 2020-01-01 | End: 2020-01-01 | Stop reason: DRUGHIGH

## 2020-01-01 RX ORDER — VITAMIN B COMPLEX
1 CAPSULE ORAL DAILY
COMMUNITY
End: 2020-01-01

## 2020-01-01 RX ORDER — ASCORBIC ACID 500 MG
500 TABLET ORAL DAILY
Status: ON HOLD | COMMUNITY
End: 2020-01-01 | Stop reason: HOSPADM

## 2020-01-01 RX ORDER — HYDROCODONE BITARTRATE AND ACETAMINOPHEN 5; 325 MG/1; MG/1
1 TABLET ORAL EVERY 6 HOURS PRN
Status: DISCONTINUED | OUTPATIENT
Start: 2020-01-01 | End: 2020-01-01 | Stop reason: HOSPADM

## 2020-01-01 RX ADMIN — SODIUM CHLORIDE: 9 INJECTION, SOLUTION INTRAVENOUS at 13:06

## 2020-01-01 RX ADMIN — PIPERACILLIN AND TAZOBACTAM 3.38 G: 3; .375 INJECTION, POWDER, FOR SOLUTION INTRAVENOUS at 09:19

## 2020-01-01 RX ADMIN — POTASSIUM CHLORIDE 20 MEQ: 20 TABLET, EXTENDED RELEASE ORAL at 18:04

## 2020-01-01 RX ADMIN — Medication 500 MG: at 09:19

## 2020-01-01 RX ADMIN — POTASSIUM CHLORIDE 20 MEQ: 20 TABLET, EXTENDED RELEASE ORAL at 09:43

## 2020-01-01 RX ADMIN — ONDANSETRON 4 MG: 2 INJECTION INTRAMUSCULAR; INTRAVENOUS at 18:37

## 2020-01-01 RX ADMIN — SODIUM CHLORIDE, PRESERVATIVE FREE 10 ML: 5 INJECTION INTRAVENOUS at 09:44

## 2020-01-01 RX ADMIN — IPRATROPIUM BROMIDE 0.5 MG: 0.5 SOLUTION RESPIRATORY (INHALATION) at 20:52

## 2020-01-01 RX ADMIN — PIPERACILLIN AND TAZOBACTAM 3.38 G: 3; .375 INJECTION, POWDER, FOR SOLUTION INTRAVENOUS at 20:30

## 2020-01-01 RX ADMIN — SODIUM CHLORIDE 500 ML: 9 INJECTION, SOLUTION INTRAVENOUS at 14:40

## 2020-01-01 RX ADMIN — METOPROLOL TARTRATE 100 MG: 50 TABLET, FILM COATED ORAL at 08:53

## 2020-01-01 RX ADMIN — SODIUM CHLORIDE: 9 INJECTION, SOLUTION INTRAVENOUS at 16:57

## 2020-01-01 RX ADMIN — METOPROLOL TARTRATE 100 MG: 50 TABLET, FILM COATED ORAL at 08:28

## 2020-01-01 RX ADMIN — METOPROLOL TARTRATE 100 MG: 50 TABLET, FILM COATED ORAL at 21:35

## 2020-01-01 RX ADMIN — IPRATROPIUM BROMIDE 0.5 MG: 0.5 SOLUTION RESPIRATORY (INHALATION) at 09:23

## 2020-01-01 RX ADMIN — IPRATROPIUM BROMIDE 0.5 MG: 0.5 SOLUTION RESPIRATORY (INHALATION) at 13:36

## 2020-01-01 RX ADMIN — MONTELUKAST SODIUM 10 MG: 10 TABLET ORAL at 21:22

## 2020-01-01 RX ADMIN — AMIODARONE HYDROCHLORIDE 200 MG: 200 TABLET ORAL at 08:53

## 2020-01-01 RX ADMIN — METOPROLOL TARTRATE 25 MG: 25 TABLET, FILM COATED ORAL at 08:55

## 2020-01-01 RX ADMIN — CEFEPIME HYDROCHLORIDE 2 G: 2 INJECTION, POWDER, FOR SOLUTION INTRAVENOUS at 13:35

## 2020-01-01 RX ADMIN — AMIODARONE HYDROCHLORIDE 200 MG: 200 TABLET ORAL at 08:55

## 2020-01-01 RX ADMIN — ENOXAPARIN SODIUM 40 MG: 40 INJECTION SUBCUTANEOUS at 17:26

## 2020-01-01 RX ADMIN — Medication 500 MG: at 09:43

## 2020-01-01 RX ADMIN — METOPROLOL TARTRATE 100 MG: 50 TABLET, FILM COATED ORAL at 22:22

## 2020-01-01 RX ADMIN — METOPROLOL TARTRATE 25 MG: 25 TABLET, FILM COATED ORAL at 13:38

## 2020-01-01 RX ADMIN — PIPERACILLIN AND TAZOBACTAM 3.38 G: 3; .375 INJECTION, POWDER, FOR SOLUTION INTRAVENOUS at 09:42

## 2020-01-01 RX ADMIN — ENOXAPARIN SODIUM 40 MG: 40 INJECTION SUBCUTANEOUS at 09:43

## 2020-01-01 RX ADMIN — PIPERACILLIN AND TAZOBACTAM 3.38 G: 3; .375 INJECTION, POWDER, FOR SOLUTION INTRAVENOUS at 09:03

## 2020-01-01 RX ADMIN — SODIUM CHLORIDE, POTASSIUM CHLORIDE, SODIUM LACTATE AND CALCIUM CHLORIDE: 600; 310; 30; 20 INJECTION, SOLUTION INTRAVENOUS at 14:41

## 2020-01-01 RX ADMIN — ACETAMINOPHEN 650 MG: 325 TABLET ORAL at 00:04

## 2020-01-01 RX ADMIN — SODIUM CHLORIDE: 9 INJECTION, SOLUTION INTRAVENOUS at 15:20

## 2020-01-01 RX ADMIN — VANCOMYCIN HYDROCHLORIDE 750 MG: 10 INJECTION, POWDER, LYOPHILIZED, FOR SOLUTION INTRAVENOUS at 18:22

## 2020-01-01 RX ADMIN — SODIUM CHLORIDE, PRESERVATIVE FREE 10 ML: 5 INJECTION INTRAVENOUS at 08:56

## 2020-01-01 RX ADMIN — AMLODIPINE BESYLATE 5 MG: 5 TABLET ORAL at 13:31

## 2020-01-01 RX ADMIN — IPRATROPIUM BROMIDE 0.5 MG: 0.5 SOLUTION RESPIRATORY (INHALATION) at 11:06

## 2020-01-01 RX ADMIN — ACETAMINOPHEN 650 MG: 325 TABLET ORAL at 21:22

## 2020-01-01 RX ADMIN — IPRATROPIUM BROMIDE 0.5 MG: 0.5 SOLUTION RESPIRATORY (INHALATION) at 16:28

## 2020-01-01 RX ADMIN — MONTELUKAST SODIUM 10 MG: 10 TABLET ORAL at 20:29

## 2020-01-01 RX ADMIN — SODIUM CHLORIDE: 9 INJECTION, SOLUTION INTRAVENOUS at 22:14

## 2020-01-01 RX ADMIN — IBUPROFEN 400 MG: 400 TABLET, FILM COATED ORAL at 12:05

## 2020-01-01 RX ADMIN — MONTELUKAST SODIUM 10 MG: 10 TABLET ORAL at 21:41

## 2020-01-01 RX ADMIN — ENOXAPARIN SODIUM 40 MG: 40 INJECTION SUBCUTANEOUS at 09:28

## 2020-01-01 RX ADMIN — LABETALOL HYDROCHLORIDE 5 MG: 5 INJECTION INTRAVENOUS at 03:31

## 2020-01-01 RX ADMIN — ENOXAPARIN SODIUM 40 MG: 40 INJECTION SUBCUTANEOUS at 08:31

## 2020-01-01 RX ADMIN — IPRATROPIUM BROMIDE 0.5 MG: 0.5 SOLUTION RESPIRATORY (INHALATION) at 09:12

## 2020-01-01 RX ADMIN — IPRATROPIUM BROMIDE 0.5 MG: 0.5 SOLUTION RESPIRATORY (INHALATION) at 17:08

## 2020-01-01 RX ADMIN — Medication 500 MG: at 09:04

## 2020-01-01 RX ADMIN — SODIUM CHLORIDE: 9 INJECTION, SOLUTION INTRAVENOUS at 05:13

## 2020-01-01 RX ADMIN — Medication 10 ML: at 23:08

## 2020-01-01 RX ADMIN — IPRATROPIUM BROMIDE 0.5 MG: 0.5 SOLUTION RESPIRATORY (INHALATION) at 13:53

## 2020-01-01 RX ADMIN — ONDANSETRON 4 MG: 2 INJECTION INTRAMUSCULAR; INTRAVENOUS at 17:42

## 2020-01-01 RX ADMIN — VANCOMYCIN HYDROCHLORIDE 750 MG: 10 INJECTION, POWDER, LYOPHILIZED, FOR SOLUTION INTRAVENOUS at 17:43

## 2020-01-01 RX ADMIN — SODIUM CHLORIDE, PRESERVATIVE FREE 10 ML: 5 INJECTION INTRAVENOUS at 21:41

## 2020-01-01 RX ADMIN — Medication 500 MG: at 08:31

## 2020-01-01 RX ADMIN — Medication 500 MG: at 08:55

## 2020-01-01 RX ADMIN — ACETAMINOPHEN 650 MG: 325 TABLET ORAL at 09:43

## 2020-01-01 RX ADMIN — ONDANSETRON 4 MG: 2 INJECTION INTRAMUSCULAR; INTRAVENOUS at 09:56

## 2020-01-01 RX ADMIN — IPRATROPIUM BROMIDE 0.5 MG: 0.5 SOLUTION RESPIRATORY (INHALATION) at 08:47

## 2020-01-01 RX ADMIN — AZITHROMYCIN DIHYDRATE 500 MG: 500 INJECTION, POWDER, LYOPHILIZED, FOR SOLUTION INTRAVENOUS at 14:06

## 2020-01-01 RX ADMIN — POTASSIUM CHLORIDE 20 MEQ: 20 TABLET, EXTENDED RELEASE ORAL at 08:31

## 2020-01-01 RX ADMIN — METOPROLOL TARTRATE 100 MG: 50 TABLET, FILM COATED ORAL at 23:52

## 2020-01-01 RX ADMIN — CEFEPIME 2 G: 2 INJECTION, POWDER, FOR SOLUTION INTRAVENOUS at 12:46

## 2020-01-01 RX ADMIN — ACETAMINOPHEN 650 MG: 325 TABLET ORAL at 00:23

## 2020-01-01 RX ADMIN — SODIUM CHLORIDE: 9 INJECTION, SOLUTION INTRAVENOUS at 05:14

## 2020-01-01 RX ADMIN — SODIUM CHLORIDE, PRESERVATIVE FREE 10 ML: 5 INJECTION INTRAVENOUS at 09:19

## 2020-01-01 RX ADMIN — IPRATROPIUM BROMIDE 0.5 MG: 0.5 SOLUTION RESPIRATORY (INHALATION) at 11:54

## 2020-01-01 RX ADMIN — CEFEPIME HYDROCHLORIDE 2 G: 2 INJECTION, POWDER, FOR SOLUTION INTRAVENOUS at 01:41

## 2020-01-01 RX ADMIN — SODIUM CHLORIDE, PRESERVATIVE FREE 10 ML: 5 INJECTION INTRAVENOUS at 21:16

## 2020-01-01 RX ADMIN — POTASSIUM CHLORIDE 20 MEQ: 20 TABLET, EXTENDED RELEASE ORAL at 17:44

## 2020-01-01 RX ADMIN — SODIUM CHLORIDE: 9 INJECTION, SOLUTION INTRAVENOUS at 07:30

## 2020-01-01 RX ADMIN — LABETALOL HYDROCHLORIDE 5 MG: 5 INJECTION INTRAVENOUS at 08:49

## 2020-01-01 RX ADMIN — SODIUM CHLORIDE: 9 INJECTION, SOLUTION INTRAVENOUS at 13:26

## 2020-01-01 RX ADMIN — AMIODARONE HYDROCHLORIDE 200 MG: 200 TABLET ORAL at 08:29

## 2020-01-01 RX ADMIN — SODIUM CHLORIDE: 9 INJECTION, SOLUTION INTRAVENOUS at 04:56

## 2020-01-01 RX ADMIN — KETOROLAC TROMETHAMINE 15 MG: 30 INJECTION, SOLUTION INTRAMUSCULAR at 11:34

## 2020-01-01 RX ADMIN — IPRATROPIUM BROMIDE 0.5 MG: 0.5 SOLUTION RESPIRATORY (INHALATION) at 12:16

## 2020-01-01 RX ADMIN — VANCOMYCIN HYDROCHLORIDE 750 MG: 10 INJECTION, POWDER, LYOPHILIZED, FOR SOLUTION INTRAVENOUS at 06:03

## 2020-01-01 RX ADMIN — MONTELUKAST SODIUM 10 MG: 10 TABLET ORAL at 21:16

## 2020-01-01 RX ADMIN — CEFEPIME 2 G: 2 INJECTION, POWDER, FOR SOLUTION INTRAVENOUS at 00:58

## 2020-01-01 RX ADMIN — AMLODIPINE BESYLATE 5 MG: 5 TABLET ORAL at 23:52

## 2020-01-01 RX ADMIN — ACETAMINOPHEN 650 MG: 325 TABLET ORAL at 05:43

## 2020-01-01 RX ADMIN — AMIODARONE HYDROCHLORIDE 200 MG: 200 TABLET ORAL at 09:19

## 2020-01-01 RX ADMIN — SODIUM CHLORIDE, PRESERVATIVE FREE 10 ML: 5 INJECTION INTRAVENOUS at 09:04

## 2020-01-01 RX ADMIN — SODIUM CHLORIDE: 9 INJECTION, SOLUTION INTRAVENOUS at 21:40

## 2020-01-01 RX ADMIN — CEFEPIME 2 G: 2 INJECTION, POWDER, FOR SOLUTION INTRAVENOUS at 01:00

## 2020-01-01 RX ADMIN — Medication 10 ML: at 08:53

## 2020-01-01 RX ADMIN — VANCOMYCIN HYDROCHLORIDE 750 MG: 10 INJECTION, POWDER, LYOPHILIZED, FOR SOLUTION INTRAVENOUS at 05:35

## 2020-01-01 RX ADMIN — POTASSIUM CHLORIDE 20 MEQ: 20 TABLET, EXTENDED RELEASE ORAL at 17:46

## 2020-01-01 RX ADMIN — IPRATROPIUM BROMIDE 0.5 MG: 0.5 SOLUTION RESPIRATORY (INHALATION) at 17:06

## 2020-01-01 RX ADMIN — PIPERACILLIN AND TAZOBACTAM 3.38 G: 3; .375 INJECTION, POWDER, FOR SOLUTION INTRAVENOUS at 09:29

## 2020-01-01 RX ADMIN — IPRATROPIUM BROMIDE 0.5 MG: 0.5 SOLUTION RESPIRATORY (INHALATION) at 08:08

## 2020-01-01 RX ADMIN — SODIUM CHLORIDE: 9 INJECTION, SOLUTION INTRAVENOUS at 09:04

## 2020-01-01 RX ADMIN — IPRATROPIUM BROMIDE 0.5 MG: 0.5 SOLUTION RESPIRATORY (INHALATION) at 16:59

## 2020-01-01 RX ADMIN — IPRATROPIUM BROMIDE 0.5 MG: 0.5 SOLUTION RESPIRATORY (INHALATION) at 10:46

## 2020-01-01 RX ADMIN — SODIUM CHLORIDE: 9 INJECTION, SOLUTION INTRAVENOUS at 04:33

## 2020-01-01 RX ADMIN — IPRATROPIUM BROMIDE 0.5 MG: 0.5 SOLUTION RESPIRATORY (INHALATION) at 13:16

## 2020-01-01 RX ADMIN — CEFEPIME 2 G: 2 INJECTION, POWDER, FOR SOLUTION INTRAVENOUS at 01:43

## 2020-01-01 RX ADMIN — Medication 10 ML: at 10:31

## 2020-01-01 RX ADMIN — PIPERACILLIN AND TAZOBACTAM 3.38 G: 3; .375 INJECTION, POWDER, FOR SOLUTION INTRAVENOUS at 01:02

## 2020-01-01 RX ADMIN — VANCOMYCIN HYDROCHLORIDE 750 MG: 10 INJECTION, POWDER, LYOPHILIZED, FOR SOLUTION INTRAVENOUS at 06:00

## 2020-01-01 RX ADMIN — IPRATROPIUM BROMIDE 0.5 MG: 0.5 SOLUTION RESPIRATORY (INHALATION) at 20:40

## 2020-01-01 RX ADMIN — ONDANSETRON 4 MG: 2 INJECTION INTRAMUSCULAR; INTRAVENOUS at 08:38

## 2020-01-01 RX ADMIN — VANCOMYCIN HYDROCHLORIDE 750 MG: 10 INJECTION, POWDER, LYOPHILIZED, FOR SOLUTION INTRAVENOUS at 05:40

## 2020-01-01 RX ADMIN — IOPAMIDOL 110 ML: 755 INJECTION, SOLUTION INTRAVENOUS at 13:10

## 2020-01-01 RX ADMIN — SODIUM CHLORIDE, PRESERVATIVE FREE 10 ML: 5 INJECTION INTRAVENOUS at 19:49

## 2020-01-01 RX ADMIN — IPRATROPIUM BROMIDE 0.5 MG: 0.5 SOLUTION RESPIRATORY (INHALATION) at 20:18

## 2020-01-01 RX ADMIN — Medication 500 MG: at 09:28

## 2020-01-01 RX ADMIN — VANCOMYCIN HYDROCHLORIDE 1.5 G: 10 INJECTION, POWDER, LYOPHILIZED, FOR SOLUTION INTRAVENOUS at 18:42

## 2020-01-01 RX ADMIN — PIPERACILLIN AND TAZOBACTAM 3.38 G: 3; .375 INJECTION, POWDER, FOR SOLUTION INTRAVENOUS at 17:43

## 2020-01-01 RX ADMIN — Medication 500 MG: at 19:49

## 2020-01-01 RX ADMIN — POTASSIUM CHLORIDE 20 MEQ: 20 TABLET, EXTENDED RELEASE ORAL at 09:21

## 2020-01-01 RX ADMIN — FUROSEMIDE 20 MG: 10 INJECTION, SOLUTION INTRAVENOUS at 13:26

## 2020-01-01 RX ADMIN — SODIUM CHLORIDE: 9 INJECTION, SOLUTION INTRAVENOUS at 22:35

## 2020-01-01 RX ADMIN — AMIODARONE HYDROCHLORIDE 200 MG: 200 TABLET ORAL at 09:04

## 2020-01-01 RX ADMIN — IPRATROPIUM BROMIDE 0.5 MG: 0.5 SOLUTION RESPIRATORY (INHALATION) at 16:26

## 2020-01-01 RX ADMIN — ENOXAPARIN SODIUM 40 MG: 40 INJECTION SUBCUTANEOUS at 09:05

## 2020-01-01 RX ADMIN — ACETAMINOPHEN 650 MG: 325 TABLET ORAL at 05:17

## 2020-01-01 RX ADMIN — VANCOMYCIN HYDROCHLORIDE 750 MG: 10 INJECTION, POWDER, LYOPHILIZED, FOR SOLUTION INTRAVENOUS at 17:14

## 2020-01-01 RX ADMIN — SODIUM CHLORIDE: 9 INJECTION, SOLUTION INTRAVENOUS at 09:45

## 2020-01-01 RX ADMIN — AMIODARONE HYDROCHLORIDE 200 MG: 200 TABLET ORAL at 08:31

## 2020-01-01 RX ADMIN — CEFEPIME 2 G: 2 INJECTION, POWDER, FOR SOLUTION INTRAVENOUS at 13:18

## 2020-01-01 RX ADMIN — SODIUM CHLORIDE, PRESERVATIVE FREE 10 ML: 5 INJECTION INTRAVENOUS at 08:32

## 2020-01-01 RX ADMIN — SODIUM CHLORIDE: 9 INJECTION, SOLUTION INTRAVENOUS at 17:22

## 2020-01-01 RX ADMIN — ACETAMINOPHEN 650 MG: 325 TABLET ORAL at 21:41

## 2020-01-01 RX ADMIN — PROPOFOL 100 MCG/KG/MIN: 10 INJECTION, EMULSION INTRAVENOUS at 16:42

## 2020-01-01 RX ADMIN — PIPERACILLIN AND TAZOBACTAM 3.38 G: 3; .375 INJECTION, POWDER, FOR SOLUTION INTRAVENOUS at 01:49

## 2020-01-01 RX ADMIN — SODIUM CHLORIDE: 9 INJECTION, SOLUTION INTRAVENOUS at 05:40

## 2020-01-01 RX ADMIN — Medication 10 ML: at 20:19

## 2020-01-01 RX ADMIN — PIPERACILLIN AND TAZOBACTAM 3.38 G: 3; .375 INJECTION, POWDER, FOR SOLUTION INTRAVENOUS at 17:45

## 2020-01-01 RX ADMIN — PIPERACILLIN AND TAZOBACTAM 3.38 G: 3; .375 INJECTION, POWDER, FOR SOLUTION INTRAVENOUS at 04:21

## 2020-01-01 RX ADMIN — AMLODIPINE BESYLATE 5 MG: 5 TABLET ORAL at 08:53

## 2020-01-01 RX ADMIN — SODIUM CHLORIDE: 9 INJECTION, SOLUTION INTRAVENOUS at 05:33

## 2020-01-01 RX ADMIN — POTASSIUM CHLORIDE 20 MEQ: 20 TABLET, EXTENDED RELEASE ORAL at 17:43

## 2020-01-01 RX ADMIN — ONDANSETRON 4 MG: 2 INJECTION INTRAMUSCULAR; INTRAVENOUS at 17:03

## 2020-01-01 RX ADMIN — SODIUM CHLORIDE, POTASSIUM CHLORIDE, SODIUM LACTATE AND CALCIUM CHLORIDE: 600; 310; 30; 20 INJECTION, SOLUTION INTRAVENOUS at 16:39

## 2020-01-01 RX ADMIN — IPRATROPIUM BROMIDE 0.5 MG: 0.5 SOLUTION RESPIRATORY (INHALATION) at 20:07

## 2020-01-01 RX ADMIN — ACETAMINOPHEN 650 MG: 325 TABLET ORAL at 21:20

## 2020-01-01 RX ADMIN — SODIUM CHLORIDE: 9 INJECTION, SOLUTION INTRAVENOUS at 23:07

## 2020-01-01 RX ADMIN — SODIUM CHLORIDE, PRESERVATIVE FREE 10 ML: 5 INJECTION INTRAVENOUS at 17:26

## 2020-01-01 RX ADMIN — ACETAMINOPHEN 650 MG: 325 TABLET ORAL at 20:29

## 2020-01-01 RX ADMIN — AMLODIPINE BESYLATE 5 MG: 5 TABLET ORAL at 08:29

## 2020-01-01 RX ADMIN — PIPERACILLIN AND TAZOBACTAM 3.38 G: 3; .375 INJECTION, POWDER, FOR SOLUTION INTRAVENOUS at 01:13

## 2020-01-01 RX ADMIN — ACETAMINOPHEN 650 MG: 325 TABLET ORAL at 22:22

## 2020-01-01 RX ADMIN — PIPERACILLIN AND TAZOBACTAM 3.38 G: 3; .375 INJECTION, POWDER, FOR SOLUTION INTRAVENOUS at 00:43

## 2020-01-01 RX ADMIN — CEFEPIME 2 G: 2 INJECTION, POWDER, FOR SOLUTION INTRAVENOUS at 13:11

## 2020-01-01 RX ADMIN — AMIODARONE HYDROCHLORIDE 200 MG: 200 TABLET ORAL at 10:31

## 2020-01-01 RX ADMIN — ENOXAPARIN SODIUM 40 MG: 40 INJECTION SUBCUTANEOUS at 09:19

## 2020-01-01 RX ADMIN — ACETAMINOPHEN 650 MG: 325 TABLET ORAL at 13:31

## 2020-01-01 RX ADMIN — CEFEPIME HYDROCHLORIDE 2 G: 2 INJECTION, POWDER, FOR SOLUTION INTRAVENOUS at 12:56

## 2020-01-01 RX ADMIN — SODIUM CHLORIDE: 9 INJECTION, SOLUTION INTRAVENOUS at 23:16

## 2020-01-01 RX ADMIN — SODIUM CHLORIDE: 9 INJECTION, SOLUTION INTRAVENOUS at 04:52

## 2020-01-01 RX ADMIN — SODIUM CHLORIDE, PRESERVATIVE FREE 10 ML: 5 INJECTION INTRAVENOUS at 20:31

## 2020-01-01 RX ADMIN — VANCOMYCIN HYDROCHLORIDE 750 MG: 10 INJECTION, POWDER, LYOPHILIZED, FOR SOLUTION INTRAVENOUS at 05:52

## 2020-01-01 RX ADMIN — SODIUM CHLORIDE: 9 INJECTION, SOLUTION INTRAVENOUS at 14:05

## 2020-01-01 RX ADMIN — PROPOFOL 50 MG: 10 INJECTION, EMULSION INTRAVENOUS at 16:42

## 2020-01-01 RX ADMIN — MONTELUKAST SODIUM 10 MG: 10 TABLET ORAL at 21:24

## 2020-01-01 RX ADMIN — ONDANSETRON 4 MG: 2 INJECTION INTRAMUSCULAR; INTRAVENOUS at 17:51

## 2020-01-01 RX ADMIN — POTASSIUM CHLORIDE 20 MEQ: 20 TABLET, EXTENDED RELEASE ORAL at 17:01

## 2020-01-01 RX ADMIN — VANCOMYCIN HYDROCHLORIDE 750 MG: 10 INJECTION, POWDER, LYOPHILIZED, FOR SOLUTION INTRAVENOUS at 17:31

## 2020-01-01 RX ADMIN — SODIUM CHLORIDE: 9 INJECTION, SOLUTION INTRAVENOUS at 13:30

## 2020-01-01 RX ADMIN — POTASSIUM CHLORIDE 20 MEQ: 20 TABLET, EXTENDED RELEASE ORAL at 08:55

## 2020-01-01 RX ADMIN — IPRATROPIUM BROMIDE 0.5 MG: 0.5 SOLUTION RESPIRATORY (INHALATION) at 16:19

## 2020-01-01 RX ADMIN — FENTANYL CITRATE 25 MCG: 0.05 INJECTION, SOLUTION INTRAMUSCULAR; INTRAVENOUS at 17:45

## 2020-01-01 RX ADMIN — ACETAMINOPHEN 650 MG: 325 TABLET ORAL at 16:39

## 2020-01-01 RX ADMIN — CEFEPIME 2 G: 2 INJECTION, POWDER, FOR SOLUTION INTRAVENOUS at 00:01

## 2020-01-01 RX ADMIN — SODIUM CHLORIDE, PRESERVATIVE FREE 10 ML: 5 INJECTION INTRAVENOUS at 09:28

## 2020-01-01 RX ADMIN — IOPAMIDOL 75 ML: 755 INJECTION, SOLUTION INTRAVENOUS at 16:41

## 2020-01-01 RX ADMIN — Medication 500 MG: at 20:29

## 2020-01-01 RX ADMIN — Medication 500 MG: at 21:24

## 2020-01-01 RX ADMIN — AMIODARONE HYDROCHLORIDE 200 MG: 200 TABLET ORAL at 09:43

## 2020-01-01 RX ADMIN — IPRATROPIUM BROMIDE 0.5 MG: 0.5 SOLUTION RESPIRATORY (INHALATION) at 20:35

## 2020-01-01 RX ADMIN — POTASSIUM CHLORIDE 20 MEQ: 1500 TABLET, EXTENDED RELEASE ORAL at 08:28

## 2020-01-01 RX ADMIN — CEFEPIME HYDROCHLORIDE 2 G: 2 INJECTION, POWDER, FOR SOLUTION INTRAVENOUS at 13:38

## 2020-01-01 RX ADMIN — FUROSEMIDE 20 MG: 10 INJECTION, SOLUTION INTRAVENOUS at 13:37

## 2020-01-01 RX ADMIN — AMIODARONE HYDROCHLORIDE 200 MG: 200 TABLET ORAL at 09:28

## 2020-01-01 RX ADMIN — SODIUM CHLORIDE: 9 INJECTION, SOLUTION INTRAVENOUS at 17:17

## 2020-01-01 RX ADMIN — POTASSIUM CHLORIDE 20 MEQ: 20 TABLET, EXTENDED RELEASE ORAL at 09:05

## 2020-01-01 RX ADMIN — POTASSIUM CHLORIDE 20 MEQ: 1500 TABLET, EXTENDED RELEASE ORAL at 08:53

## 2020-01-01 RX ADMIN — Medication 10 ML: at 13:11

## 2020-01-01 RX ADMIN — METOPROLOL TARTRATE 25 MG: 25 TABLET, FILM COATED ORAL at 19:49

## 2020-01-01 RX ADMIN — Medication 500 MG: at 21:16

## 2020-01-01 RX ADMIN — METOPROLOL TARTRATE 100 MG: 50 TABLET, FILM COATED ORAL at 20:19

## 2020-01-01 RX ADMIN — POTASSIUM CHLORIDE 20 MEQ: 1500 TABLET, EXTENDED RELEASE ORAL at 10:30

## 2020-01-01 RX ADMIN — SODIUM CHLORIDE 1000 ML: 9 INJECTION, SOLUTION INTRAVENOUS at 12:06

## 2020-01-01 RX ADMIN — Medication 10 ML: at 08:49

## 2020-01-01 RX ADMIN — AMLODIPINE BESYLATE 5 MG: 5 TABLET ORAL at 10:31

## 2020-01-01 RX ADMIN — ACETAMINOPHEN 650 MG: 325 TABLET ORAL at 04:49

## 2020-01-01 RX ADMIN — SODIUM CHLORIDE: 9 INJECTION, SOLUTION INTRAVENOUS at 17:44

## 2020-01-01 RX ADMIN — SODIUM CHLORIDE, PRESERVATIVE FREE 10 ML: 5 INJECTION INTRAVENOUS at 21:24

## 2020-01-01 RX ADMIN — PIPERACILLIN AND TAZOBACTAM 3.38 G: 3; .375 INJECTION, POWDER, FOR SOLUTION INTRAVENOUS at 17:26

## 2020-01-01 RX ADMIN — METOPROLOL TARTRATE 100 MG: 50 TABLET, FILM COATED ORAL at 10:30

## 2020-01-01 RX ADMIN — ENOXAPARIN SODIUM 40 MG: 40 INJECTION SUBCUTANEOUS at 08:55

## 2020-01-01 RX ADMIN — Medication 500 MG: at 21:21

## 2020-01-01 RX ADMIN — ONDANSETRON 4 MG: 2 INJECTION INTRAMUSCULAR; INTRAVENOUS at 09:01

## 2020-01-01 RX ADMIN — MONTELUKAST SODIUM 10 MG: 10 TABLET ORAL at 19:49

## 2020-01-01 RX ADMIN — IPRATROPIUM BROMIDE 0.5 MG: 0.5 SOLUTION RESPIRATORY (INHALATION) at 20:20

## 2020-01-01 RX ADMIN — PIPERACILLIN AND TAZOBACTAM 3.38 G: 3; .375 INJECTION, POWDER, FOR SOLUTION INTRAVENOUS at 17:22

## 2020-01-01 ASSESSMENT — PAIN DESCRIPTION - PROGRESSION
CLINICAL_PROGRESSION: NOT CHANGED
CLINICAL_PROGRESSION: NOT CHANGED
CLINICAL_PROGRESSION: GRADUALLY WORSENING
CLINICAL_PROGRESSION: NOT CHANGED

## 2020-01-01 ASSESSMENT — PAIN SCALES - GENERAL
PAINLEVEL_OUTOF10: 4
PAINLEVEL_OUTOF10: 5
PAINLEVEL_OUTOF10: 6
PAINLEVEL_OUTOF10: 0
PAINLEVEL_OUTOF10: 0
PAINLEVEL_OUTOF10: 2
PAINLEVEL_OUTOF10: 4
PAINLEVEL_OUTOF10: 0
PAINLEVEL_OUTOF10: 7
PAINLEVEL_OUTOF10: 0
PAINLEVEL_OUTOF10: 5
PAINLEVEL_OUTOF10: 0
PAINLEVEL_OUTOF10: 3
PAINLEVEL_OUTOF10: 4
PAINLEVEL_OUTOF10: 4
PAINLEVEL_OUTOF10: 0
PAINLEVEL_OUTOF10: 0
PAINLEVEL_OUTOF10: 5
PAINLEVEL_OUTOF10: 6
PAINLEVEL_OUTOF10: 5
PAINLEVEL_OUTOF10: 0
PAINLEVEL_OUTOF10: 5
PAINLEVEL_OUTOF10: 2
PAINLEVEL_OUTOF10: 8

## 2020-01-01 ASSESSMENT — PAIN DESCRIPTION - FREQUENCY
FREQUENCY: INTERMITTENT
FREQUENCY: CONTINUOUS
FREQUENCY: CONTINUOUS
FREQUENCY: INTERMITTENT

## 2020-01-01 ASSESSMENT — PAIN DESCRIPTION - ORIENTATION
ORIENTATION: RIGHT
ORIENTATION: LEFT;RIGHT
ORIENTATION: RIGHT

## 2020-01-01 ASSESSMENT — PAIN DESCRIPTION - PAIN TYPE
TYPE: CHRONIC PAIN
TYPE: ACUTE PAIN
TYPE: ACUTE PAIN
TYPE: CHRONIC PAIN

## 2020-01-01 ASSESSMENT — PAIN - FUNCTIONAL ASSESSMENT
PAIN_FUNCTIONAL_ASSESSMENT: PREVENTS OR INTERFERES SOME ACTIVE ACTIVITIES AND ADLS
PAIN_FUNCTIONAL_ASSESSMENT: ACTIVITIES ARE NOT PREVENTED
PAIN_FUNCTIONAL_ASSESSMENT: 0-10

## 2020-01-01 ASSESSMENT — ENCOUNTER SYMPTOMS
BLOOD IN STOOL: 0
CONSTIPATION: 0
SORE THROAT: 0
CHEST TIGHTNESS: 0
VOMITING: 0
WHEEZING: 0
COUGH: 0
SHORTNESS OF BREATH: 0
ABDOMINAL PAIN: 0
NAUSEA: 0
BACK PAIN: 0
DIARRHEA: 0
RHINORRHEA: 0

## 2020-01-01 ASSESSMENT — PAIN DESCRIPTION - DESCRIPTORS
DESCRIPTORS: ACHING;DISCOMFORT;SHARP;NAGGING
DESCRIPTORS: ACHING;DISCOMFORT;SHARP;SORE
DESCRIPTORS: ACHING
DESCRIPTORS: ACHING;DISCOMFORT

## 2020-01-01 ASSESSMENT — PAIN DESCRIPTION - LOCATION
LOCATION: BACK
LOCATION: RIB CAGE
LOCATION: BACK
LOCATION: GENERALIZED
LOCATION: RIB CAGE

## 2020-01-01 ASSESSMENT — PAIN DESCRIPTION - ONSET
ONSET: ON-GOING

## 2020-01-31 NOTE — PROGRESS NOTES
[facility]        Date: 1/31/2020    Date of surgery: 2/3/20   Arrival Time: Hospital will call you between 5pm and 7pm with your final arrival time for surgery    1. Do not eat or drink anything after 8 hours  prior to surgery. This includes no water, chewing gum, mints or ice chips. 2. Take the following medications with a small sip of water on the morning of Surgery: Amiodarone, Amlodipine and Metoprolol     3. Diabetics may take evening dose of insulin but none after midnight. If you feel symptomatic or low blood sugar morning of surgery drink 1-2 ounces of apple juice only. 4. Aspirin, Ibuprofen, Advil, Naproxen, Vitamin E and other Anti-inflammatory products should be stopped  before surgery  as directed by your physician. Take Tylenol only unless instructed otherwise by your surgeon. 5. Check with your Doctor regarding stopping Plavix, Coumadin, Lovenox, Eliquis, Effient, or other blood thinners. 6. Do not smoke,use illicit drugs and do not drink any alcoholic beverages 24 hours prior to surgery. 7. You may brush your teeth the morning of surgery. DO NOT SWALLOW WATER    8. You MUST make arrangements for a responsible adult to take you home after your surgery. You will not be allowed to leave alone or drive yourself home. It is strongly suggested someone stay with you the first 24 hrs. Your surgery will be cancelled if you do not have a ride home. 9. PEDIATRIC PATIENTS ONLY:  A parent/legal guardian must accompany a child scheduled for surgery and plan to stay at the hospital until the child is discharged. Please do not bring other children with you.     10. Please wear simple, loose fitting clothing to the hospital.  Do not bring valuables (money, credit cards, checkbooks, etc.) Do not wear any makeup (including no eye makeup) or nail polish on your fingers or toes. 11. DO NOT wear any jewelry or piercings on day of surgery. All body piercing jewelry must be removed. 12. Shower the night before surgery with _x__Antibacterial soap /FLACO WIPES________    13. TOTAL JOINT REPLACEMENT/HYSTERECTOMY PATIENTS ONLY---Remember to bring Blood Bank bracelet to the hospital on the day of surgery. 14. If you have a Living Will and Durable Power of  for Healthcare, please bring in a copy. 15. If appropriate bring crutches, inspirex, WALKER, CANE etc... 12. Notify your Surgeon if you develop any illness between now and surgery time, cough, cold, fever, sore throat, nausea, vomiting, etc.  Please notify your surgeon if you experience dizziness, shortness of breath or blurred vision between now & the time of your surgery. 17. If you have ___dentures, they will be removed before going to the OR; we will provide you a container. If you wear ___contact lenses or _x__glasses, they will be removed; please bring a case for them. 18. To provide excellent care visitors will be limited to 2 in the room at any given time. 19. Please bring picture ID and insurance card. 20. Sleep apnea patients need to bring CPAP AND SETTINGS to hospital on day of surgery. 21. During flu season no children under the age of 15 are permitted in the hospital for the safety of all patients. 22. Other                  Please call AMBULATORY CARE if you have any further questions.    [facility]     [address]

## 2020-02-03 NOTE — PROCEDURES
Hamlet Morrison is a 76 y.o. male patient. No diagnosis found. Past Medical History:   Diagnosis Date    Arthritis     Asthma     GIB (gastrointestinal bleeding)     History of blood transfusion     Hypertension     Paroxysmal atrial flutter ([facility] 4/21/2019    Primary prostate cancer with metastasis from prostate to other site [facility]) 03/2018    already had metastasized.  Sleep apnea      Blood pressure 126/60, pulse 50, temperature 97.4 °F (36.3 °C), temperature source Temporal, resp. rate 16, height 5' 9\" (1.753 m), weight 178 lb 12.8 oz (81.1 kg), SpO2 98 %. Procedures     Procedures  Flex Sigmoidoscopy      Indication: Radiation proctosigmoiditis, Acute on Chronic anemia     Sedation:  MAC      EBL: None         Endoscope was advanced through anus to  ~20 cm solid stool was encountered                           Preparation is poor  Patient tolerated procedure well.     Sigmoid colon have few small widely scattered diverticula and extensive and friable radiation sigmoiditis to 25cm treated with APC at 40W.   Rectum direct views showed extensive friable radiation proctitis treated with APC at 40W.     IMPRESSION AND PLAN:      1. 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 This area was treated with APC at 40W with adequate hemostasis.      2. Consider repeat flex sigmoidoscopy if pt continues to have hematochezia         Pt was seen and procedure was performed with Dr. Ivana Caldwell  present for the entire procedure. Halley Castaneda DO   GI Fellow   2/3/2020     I was present for entire duration of procedure and participated in key and non-key portions. Discussed findings with the fellow and agree with recommendations above.     Eunice Hensley DO  2/5/2020  2:37 PM

## 2020-02-03 NOTE — ANESTHESIA PRE PROCEDURE
Department of Anesthesiology  Preprocedure Note       Name:  Erick Cash   Age:  76 y.o.  :  1945                                          MRN:  [id number]         Date:  2/3/2020      Surgeon: Bonnie Kirk):  Osmin Andino DO    Procedure: Inocencia Mcnair (CPT 42049 [id number]) (N/A )    Medications prior to admission:   Prior to Admission medications    Medication Sig Start Date End Date Taking?  Authorizing Provider   b complex vitamins capsule Take 1 capsule by mouth daily    Historical Provider, MD   vitamin C (ASCORBIC ACID) 500 MG tablet Take 500 mg by mouth 2 times daily    Historical Provider, MD   Cholecalciferol (VITAMIN D3) 50 MCG ( UT) CAPS Take by mouth Daily    Historical Provider, MD   calcium-vitamin D (CALCIUM 500/D) 500-200 MG-UNIT per tablet Take 1 tablet by mouth daily    Historical Provider, MD   Multiple Vitamins-Minerals (THERAPEUTIC MULTIVITAMIN-MINERALS) tablet Take 1 tablet by mouth daily    Historical Provider, MD   Probiotic Product (PROBIOTIC DAILY PO) Take by mouth daily    Historical Provider, MD   hydrocortisone (ANUSOL-HC) 25 MG suppository Place 1 suppository rectally 2 times daily 11/3/19   Rajinder Gimenez Masters, APRN - CNP   amLODIPine (NORVASC) 5 MG tablet Take 5 mg by mouth daily     Historical Provider, MD   metoprolol tartrate (LOPRESSOR) 50 MG tablet Take 100 mg by mouth 2 times daily    Historical Provider, MD   amiodarone (CORDARONE) 200 MG tablet Take 200 mg by mouth daily    Historical Provider, MD   ondansetron (ZOFRAN) 4 MG tablet Take 4 mg by mouth every 12 hours as needed for Nausea or Vomiting    Historical Provider, MD   denosumab (XGEVA) 120 MG/1.7ML SOLN SC injection Inject 120 mg into the skin every 30 days Last Injection: 10/16/2019  Next Injection: 2019    Historical Provider, MD   oxybutynin (DITROPAN-XL) 10 MG extended release tablet Take 10 mg by mouth daily as needed (Bladder Spasms)    Historical Provider, MD   leuprolide (LUPRON) Hypertension     Paroxysmal atrial flutter ([facility] 4/21/2019    Primary prostate cancer with metastasis from prostate to other site [facility]) 03/2018    already had metastasized.  Sleep apnea        Past Surgical History:        Procedure Laterality Date    CHOLECYSTECTOMY      COLON SURGERY      scar tissue removed     COLONOSCOPY N/A 4/24/2019    COLONOSCOPY DIAGNOSTIC performed by Aga Freed MD at [address] [facility] COLONOSCOPY N/A 11/1/2019    COLONOSCOPY CONTROL HEMORRHAGE performed by Osmin Andino DO at [address] N/A 12/9/2019    FLEXIBLE SIGMOIDOSCOPY, CONTROL HEMORRHAGE performed by Osmin Andino DO [address]  12/09/2019    SINUS SURGERY         Social History:    Social History     Tobacco Use    Smoking status: Never Smoker    Smokeless tobacco: Never Used   Substance Use Topics    Alcohol use: No     Comment:                                  Counseling given: Not Answered      Vital Signs (Current): There were no vitals filed for this visit.                                            BP Readings from Last 3 Encounters:   02/03/20 126/60   12/09/19 132/67   12/09/19 (!) 108/54       NPO Status:  Pt educated to remain NPO after 0000 11/1/19                                                                               BMI:   Wt Readings from Last 3 Encounters:   02/03/20 178 lb 12.8 oz (81.1 kg)   12/09/19 184 lb (83.5 kg)   10/30/19 182 lb (82.6 kg)     There is no height or weight on file to calculate BMI.    CBC:   Lab Results   Component Value Date    WBC 5.1 11/03/2019    RBC 2.83 11/03/2019    HGB 8.6 11/03/2019    HCT 27.3 11/03/2019    MCV 96.5 11/03/2019    RDW 16.2 11/03/2019     11/03/2019       CMP:   Lab Results   Component Value Date     10/31/2019    K 3.6 10/31/2019     10/31/2019    CO2 24 10/31/2019    BUN 10 10/31/2019    CREATININE 0.9 10/31/2019    GFRAA >60 10/31/2019    LABGLOM >60 10/31/2019    GLUCOSE 117 10/31/2019    PROT 5.6 10/30/2019    CALCIUM 8.0 10/31/2019    BILITOT 0.4 10/30/2019    ALKPHOS 40 10/30/2019    AST 15 10/30/2019    ALT 5 10/30/2019       POC Tests:   No results for input(s): POCGLU, POCNA, POCK, POCCL, POCBUN, POCHEMO, POCHCT in the last 72 hours. Coags:   Lab Results   Component Value Date    PROTIME 11.6 10/30/2019    INR 1.0 10/30/2019    APTT 34.9 10/30/2019       HCG (If Applicable): No results found for: PREGTESTUR, PREGSERUM, HCG, HCGQUANT     ABGs: No results found for: PHART, PO2ART, OND5PFC, NQE2IHX, BEART, L1FEOURZ     Type & Screen (If Applicable):  No results found for: LABABO, LABRH     12 Lead EKG 10/30/19 HR 49  Narrative   Performed by: [facility]   Sinus bradycardia  Otherwise normal ECG  No previous ECGs available     CXR 10/30/19  FINDINGS: Portable AP upright view the chest.       Atherosclerosis. Mildly enlarged heart. Normal caliber pulmonary   vessels. Small band of parenchymal opacity laterally at the left lung   base.           Impression   1. Mild cardiomegaly.    2. Small left base airspace disease possibly atelectasis or fibrosis             Anesthesia Evaluation  Patient summary reviewed and Nursing notes reviewed no history of anesthetic complications:   Airway: Mallampati: II  TM distance: >3 FB   Neck ROM: full  Mouth opening: > = 3 FB Dental:      Comment: Patient denies loose, missing, or chipped teeth    Pulmonary:normal exam  breath sounds clear to auscultation  (+) sleep apnea:  asthma:                            Cardiovascular:  Exercise tolerance: good (>4 METS),   (+) hypertension: moderate, valvular problems/murmurs: MR, dysrhythmias (PAF - last recurrence 2 weeks ago): atrial flutter,     (-)  MILLER    ECG reviewed  Rhythm: regular  Rate: normal           Beta Blocker:  Dose within 24 Hrs         Neuro/Psych:   Negative Neuro/Psych ROS              GI/Hepatic/Renal:   (+) GERD:, bowel prep,

## 2020-02-03 NOTE — H&P
Pt was seen and evaluated in preop holding. Pt complains of continued hematochezia. Pt denies any abdominal pain.        Vitals:    02/03/20 1417   BP: 126/60   Pulse: 50   Resp: 16   Temp: 97.4 °F (36.3 °C)   SpO2: 98%       Plan   Proceed with Flex sigmoidoscopy to evaluate radiation proctosigmoiditis.      Juan Carlos Lima   GI Fellow   3:15 PM

## 2020-02-03 NOTE — ANESTHESIA POSTPROCEDURE EVALUATION
Department of Anesthesiology  Postprocedure Note    Patient: Eduarda Villalba  MRN: [id number]  YOB: 1945  Date of evaluation: 2/3/2020  Time:  6:30 PM     Procedure Summary     Date:  02/03/20 Room / Location:  [address] [place] [address] 01 / [facility]    Anesthesia Start:  0636 Anesthesia Stop:  3630    Procedures:       [address] (CPT 11527 [id number]) (N/A )      COLONOSCOPY CONTROL HEMORRHAGE Diagnosis:  (RADIATION PROCTITIS)    Surgeon:  Dawson Patel DO Responsible Provider:  Herlinda Thorpe MD    Anesthesia Type:  MAC ASA Status:  3          Anesthesia Type: MAC    Alden Phase I: Alden Score: 10    Alden Phase II: Alden Score: 10    Last vitals: Reviewed and per EMR flowsheets.        Anesthesia Post Evaluation    Patient location during evaluation: PACU  Patient participation: complete - patient participated  Level of consciousness: awake and alert  Airway patency: patent  Nausea & Vomiting: no vomiting and no nausea  Complications: no  Cardiovascular status: hemodynamically stable  Respiratory status: acceptable  Hydration status: stable

## 2020-03-20 PROBLEM — N39.0 UTI (URINARY TRACT INFECTION): Status: ACTIVE | Noted: 2020-01-01

## 2020-03-20 NOTE — ED PROVIDER NOTES
Patient is a 68-year-old male with past medical history significant for hepatic carcinoma who presents the emergency department for complaints of fever and fatigue. States that he has had fatigue for 1 week. He is coming from nursing home. States that at nursing home he had a temperature of 101.5 °F.  On presentation emergency department he is 100.7 degrees Fahrenheit. Patient states that he is noticed increased fatigue for a while. Patient also seems to be a poor historian. States that he was started on antibiotic a week ago. There is results in our computer showing that he was diagnosed with a urinary tract infection yesterday. No record of antibiotics having been started yet. He denies any chest pain, shortness of breath, abdominal pain, nausea/vomiting. No other symptoms at this time. Patient is laying in bed and appears jaundiced. Review of Systems   Constitutional: Positive for fatigue and fever. Negative for diaphoresis. HENT: Negative for congestion, rhinorrhea and sore throat. Eyes: Negative for visual disturbance. Respiratory: Negative for cough, chest tightness, shortness of breath and wheezing. Cardiovascular: Negative for chest pain, palpitations and leg swelling. Gastrointestinal: Negative for abdominal pain, blood in stool, constipation, diarrhea, nausea and vomiting. Endocrine: Negative for polyuria. Genitourinary: Negative for decreased urine volume, discharge and dysuria. Musculoskeletal: Negative for back pain, neck pain and neck stiffness. Skin: Negative for rash. Neurological: Negative for syncope, weakness, light-headedness and headaches. Hematological: Negative for adenopathy. Psychiatric/Behavioral: Negative for confusion. Physical Exam  Vitals signs and nursing note reviewed. Constitutional:       General: He is not in acute distress. Appearance: Normal appearance. He is well-developed and normal weight.  He is not ill-appearing, Cranial Nerves: No cranial nerve deficit. Sensory: No sensory deficit. Psychiatric:         Mood and Affect: Mood normal.         Behavior: Behavior normal.          Procedures     MDM         EKG: This EKG is signed and interpreted by me. Rate: 115  Rhythm: Atrial fibrillation  Interpretation: Atrial fibrillation with rapid ventricular response. Right axis deviation. No ST elevation or depression. T wave inversions in inferior leads. No sign of acute ischemia. Comparison: changes compared to previous EKG         --------------------------------------------- PAST HISTORY ---------------------------------------------  Past Medical History:  has a past medical history of Arthritis, Asthma, GIB (gastrointestinal bleeding), History of blood transfusion, Hypertension, Paroxysmal atrial flutter ([facility] [address] Primary prostate cancer with metastasis from prostate to other site [facility]), and Sleep apnea. Past Surgical History:  has a past surgical history that includes Cholecystectomy; sinus surgery; Colonoscopy (N/A, 4/24/2019); Colonoscopy (N/A, 11/1/2019); Colon surgery; Sigmoidoscopy (12/09/2019); proctosigmoidoscopy (N/A, 12/9/2019); Prostate biopsy; liver biopsy; proctosigmoidoscopy (N/A, 2/3/2020); and Colonoscopy (2/3/2020). Social History:  reports that he has never smoked. He has never used smokeless tobacco. He reports that he does not drink alcohol or use drugs. Family History: family history is not on file. The patients home medications have been reviewed. Allergies: Adhesive tape;  Iodine; Irbesartan; Lisinopril; and Shellfish-derived products    -------------------------------------------------- RESULTS -------------------------------------------------    LABS:  Results for orders placed or performed during the hospital encounter of 03/20/20   CBC Auto Differential   Result Value Ref Range    WBC 13.0 (H) 4.5 - 11.5 E9/L    RBC 3.83 3.80 - 5.80 E12/L    Hemoglobin 10.9 (L) 12.5 - 16.5 g/dL    Hematocrit 34.3 (L) 37.0 - 54.0 %    MCV 89.6 80.0 - 99.9 fL    MCH 28.5 26.0 - 35.0 pg    MCHC 31.8 (L) 32.0 - 34.5 %    RDW 17.7 (H) 11.5 - 15.0 fL    Platelets 644 178 - 393 E9/L    MPV 8.4 7.0 - 12.0 fL    Neutrophils % 89.6 (H) 43.0 - 80.0 %    Immature Granulocytes % 0.5 0.0 - 5.0 %    Lymphocytes % 2.2 (L) 20.0 - 42.0 %    Monocytes % 7.6 2.0 - 12.0 %    Eosinophils % 0.0 0.0 - 6.0 %    Basophils % 0.1 0.0 - 2.0 %    Neutrophils Absolute 11.63 (H) 1.80 - 7.30 E9/L    Immature Granulocytes # 0.06 E9/L    Lymphocytes Absolute 0.28 (L) 1.50 - 4.00 E9/L    Monocytes Absolute 0.99 (H) 0.10 - 0.95 E9/L    Eosinophils Absolute 0.00 (L) 0.05 - 0.50 E9/L    Basophils Absolute 0.01 0.00 - 0.20 E9/L    Anisocytosis 1+     Polychromasia 1+     Poikilocytes 2+     [place] Cells 1+     Ovalocytes 1+    Basic Metabolic Panel   Result Value Ref Range    Sodium 133 132 - 146 mmol/L    Potassium 4.0 3.5 - 5.0 mmol/L    Chloride 95 (L) 98 - 107 mmol/L    CO2 21 (L) 22 - 29 mmol/L    Anion Gap 17 (H) 7 - 16 mmol/L    Glucose 139 (H) 74 - 99 mg/dL    BUN 31 (H) 8 - 23 mg/dL    CREATININE 1.3 (H) 0.7 - 1.2 mg/dL    GFR Non-African American 54 >=60 mL/min/1.73    GFR African American >60     Calcium 9.4 8.6 - 10.2 mg/dL   Hepatic Function Panel   Result Value Ref Range    Total Protein 6.3 (L) 6.4 - 8.3 g/dL    Alb 3.3 (L) 3.5 - 5.2 g/dL    Alkaline Phosphatase 72 40 - 129 U/L    ALT 9 0 - 40 U/L    AST 25 0 - 39 U/L    Total Bilirubin 0.7 0.0 - 1.2 mg/dL    Bilirubin, Direct <0.2 0.0 - 0.3 mg/dL    Bilirubin, Indirect see below 0.0 - 1.0 mg/dL   Ammonia   Result Value Ref Range    Ammonia 32.0 16.0 - 60.0 umol/L   Lactate, Sepsis   Result Value Ref Range    Lactic Acid, Sepsis 1.4 0.5 - 1.9 mmol/L   Lactate, Sepsis   Result Value Ref Range    Lactic Acid, Sepsis 0.9 0.5 - 1.9 mmol/L   Lipase   Result Value Ref Range    Lipase 11 (L) 13 - 60 U/L   Brain Natriuretic Peptide   Result Value Ref Range    Pro- (H) 0 - 450 pg/mL   Troponin   Result Value Ref Range    Troponin <0.01 0.00 - 0.03 ng/mL   Urinalysis   Result Value Ref Range    Color, UA Yellow Straw/Yellow    Clarity, UA Clear Clear    Glucose, Ur Negative Negative mg/dL    Bilirubin Urine Negative Negative    Ketones, Urine TRACE (A) Negative mg/dL    Specific Gravity, UA 1.025 1.005 - 1.030    Blood, Urine MODERATE (A) Negative    pH, UA 5.5 5.0 - 9.0    Protein,  (A) Negative mg/dL    Urobilinogen, Urine 0.2 <2.0 E.U./dL    Nitrite, Urine POSITIVE (A) Negative    Leukocyte Esterase, Urine MODERATE (A) Negative   Procalcitonin   Result Value Ref Range    Procalcitonin >100.00 (H) 0.00 - 0.08 ng/mL   Microscopic Urinalysis   Result Value Ref Range    WBC, UA >20 (A) 0 - 5 /HPF    RBC, UA 5-10 (A) 0 - 2 /HPF    Bacteria, UA MANY (A) None Seen /HPF   EKG 12 Lead   Result Value Ref Range    Ventricular Rate 81 BPM    Atrial Rate 81 BPM    P-R Interval 148 ms    QRS Duration 102 ms    Q-T Interval 408 ms    QTc Calculation (Bazett) 473 ms    P Axis 29 degrees    R Axis -6 degrees    T Axis 21 degrees       RADIOLOGY:  CT ABDOMEN PELVIS W IV CONTRAST Additional Contrast? None   Final Result   Diffuse metastatic malignancy in the abdomen and pelvis with the   multiple hypodense infiltrative hepatic lesions with the rim of soft   tissue mass surrounding the liver concerning for diffuse hepatic   malignancy. Large necrotic mass in the left kidney concerning for renal cell   carcinoma with the moderately enlarged retroperitoneal and inguinal   lymphadenopathy. A large lobulated rectal mass concerning for malignancy. There is also   enlarged prostate gland concerning for malignancy with possible   malignancy involving the urinary bladder which is  thickened with   hydronephrosis. Infiltrates and pleural effusion right lung base with a masslike   density in the right lung base which may represent focal consolidation   versus malignancy. Consider PET CT scan. Extensive skeletal metastasis. ALERT:  THIS IS AN ABNORMAL REPORT      XR CHEST PORTABLE   Final Result   Patchy and masslike infiltrates and pleural effusions in the right   lung base concerning for complicated pneumonia. Underlying malignancy   is not excluded. Surveillance preferably by CT scan is recommended. ------------------------- NURSING NOTES AND VITALS REVIEWED ---------------------------  Date / Time Roomed:  3/20/2020 11:23 AM  ED Bed Assignment:  15/15    The nursing notes within the ED encounter and vital signs as below have been reviewed. Patient Vitals for the past 24 hrs:   BP Temp Temp src Pulse Resp SpO2   03/20/20 1403 111/69 98.7 °F (37.1 °C) Oral 83 18 96 %   03/20/20 1126 122/77 100.7 °F (38.2 °C) Oral 80 18 93 %       Oxygen Saturation Interpretation: Normal    ------------------------------------------ PROGRESS NOTES ------------------------------------------  Re-evaluation(s):    Counseling:  I have spoken with the patient and discussed todays results, in addition to providing specific details for the plan of care and counseling regarding the diagnosis and prognosis. Their questions are answered at this time and they are agreeable with the plan of admission.    --------------------------------- ADDITIONAL PROVIDER NOTES ---------------------------------  Consultations:  Spoke with Dr. Tye Zamora. Discussed case. They will admit the patient. This patient's ED course included: a personal history and physicial examination, re-evaluation prior to disposition, multiple bedside re-evaluations, IV medications, cardiac monitoring, continuous pulse oximetry and complex medical decision making and emergency management     Discussed imaging and laboratory results with patient. Patient has urinary tract infection. Start antibiotics. CT scan also is concerning for lower lobe pneumonia. This could also be a mass in his lung.   Patient has multiple masses throughout his abdomen. He is due for chemotherapy within the next few days. Patient's ammonia was within normal limits. He agreed with further management emergency department. Procalcitonin was greater than 100. Contacted admitting physician and they agreed with further management. Patient's heart rate improved in the emergency department with fluids. Temperature also improved with treatment. This patient has remained hemodynamically stable during their ED course. Diagnosis:  1. Urinary tract infection without hematuria, site unspecified    2. Pneumonia due to organism    3. Renal mass    4. Liver mass    5. Prostate cancer ([facility] [address]    6. Elevated procalcitonin    7. Atrial fibrillation with RVR (HCC)        Disposition:  Patient's disposition: Admit to med/surg floor  Patient's condition is serious.                [place] DO Farooq  Resident  03/20/20 9381

## 2020-03-20 NOTE — PROGRESS NOTES
Bib Tsai was ordered enzalutamide Lalitha Arboleda) 160mg which is a nonformulary medication. The patient has indicated that the home supply of this medication will be brought in to the hospital for inpatient use. If the medication has not been administered by 1400 on the following day from the time the order was placed, a pharmacist will follow-up with the nurse of the patient to assess the capability of the patient to bring in the medication. If it is determined that the patient cannot supply the medication and it is not available to be dispensed from the pharmacy, a call will be placed to the ordering provider to discuss alternative options.

## 2020-03-20 NOTE — PROGRESS NOTES
Consult was perfect served to [facility]. Dr Jr Jalloh is covering. Waiting on return call.

## 2020-03-20 NOTE — ED NOTES
Bed: 15  Expected date:   Expected time:   Means of arrival:   Comments:  ems     Gregoria Thrasher RN  03/20/20 1313

## 2020-03-20 NOTE — PROGRESS NOTES
Dr. Oc Chris returned call re consult. She wanted to make note that patient has had multiple scopes with Dr. Cheo Erwin.

## 2020-03-20 NOTE — H&P
HEMORRHAGE performed by Rani Mendoza DO at [address]  2/3/2020    COLONOSCOPY CONTROL HEMORRHAGE performed by Rani Mendoza DO at [address] [facility] LIVER BIOPSY      PROCTOSIGMOIDOSCOPY N/A 12/9/2019    FLEXIBLE SIGMOIDOSCOPY, CONTROL HEMORRHAGE performed by Rani Mendoza DO at [address] N/A 2/3/2020    [address] ([address] [id number]) performed by Rani Mendoza DO at [address]  12/09/2019    SINUS SURGERY         Medications Prior to Admission:      Prior to Admission medications    Medication Sig Start Date End Date Taking? Authorizing Provider   mirtazapine (REMERON) 15 MG tablet Take 15 mg by mouth nightly   Yes Historical Provider, MD   amLODIPine (NORVASC) 5 MG tablet Take 5 mg by mouth daily   Yes Historical Provider, MD   Omega-3 Fatty Acids (FISH OIL) 1000 MG CAPS Take 3,000 mg by mouth daily   Yes Historical Provider, MD   calcium carbonate 600 MG TABS tablet Take 1 tablet by mouth 2 times daily   Yes Historical Provider, MD   doxycycline hyclate (VIBRA-TABS) 100 MG tablet Take 100 mg by mouth 2 times daily 3/19/20 3/28/20 Yes Historical Provider, MD   fentaNYL (DURAGESIC) 25 MCG/HR Place 1 patch onto the skin every 72 hours.    Yes Historical Provider, MD   acetaminophen (TYLENOL) 325 MG tablet Take 650 mg by mouth every 4 hours as needed for Pain   Yes Historical Provider, MD   vitamin C (ASCORBIC ACID) 500 MG tablet Take 500 mg by mouth daily    Yes Historical Provider, MD   Cholecalciferol (VITAMIN D3) 75 MCG (3000 UT) TABS Take 3,000 Units by mouth 2 times daily    Yes Historical Provider, MD   metoprolol (LOPRESSOR) 100 MG tablet Take 100 mg by mouth 2 times daily    Yes Historical Provider, MD   amiodarone (CORDARONE) 200 MG tablet Take 200 mg by mouth daily   Yes Historical Provider, MD   ondansetron (ZOFRAN) 4 MG tablet Take 4 mg by mouth daily as needed for Nausea or Vomiting Yes Historical Provider, MD   oxybutynin (DITROPAN-XL) 10 MG extended release tablet Take 10 mg by mouth daily as needed (Bladder Spasms)   Yes Historical Provider, MD   tamsulosin (FLOMAX) 0.4 MG capsule Take 0.8 mg by mouth nightly    Yes Historical Provider, MD   enzalutamide (XTANDI) 40 MG capsule Take 160 mg by mouth Daily with supper    Yes Historical Provider, MD   montelukast (SINGULAIR) 10 MG tablet Take 10 mg by mouth nightly   Yes Historical Provider, MD       Allergies:  Adhesive tape; Iodine; Irbesartan; Lisinopril; and Shellfish-derived products    Social History:    TOBACCO:   reports that he has never smoked. He has never used smokeless tobacco.  ETOH:   reports no history of alcohol use. Family History:    Reviewed in detail and negative for DM, CAD, Cancer, CVA. Positive as follows\"  No family history on file. REVIEW OF SYSTEMS:   Pertinent positives as noted in the HPI. All other systems reviewed and negative. PHYSICAL EXAM:  /69   Pulse 83   Temp 98.7 °F (37.1 °C) (Oral)   Resp 18   SpO2 96%   General appearance: Acutely ill. HEENT: Normal cephalic, atraumatic without obvious deformity. Pupils equal, round, and reactive to light. Extra ocular muscles intact. Conjunctivae/corneas clear. Neck: Supple, with full range of motion. No jugular venous distention. Trachea midline. Respiratory:  Normal respiratory effort. Clear to auscultation, bilaterally without Rales/Wheezes/Rhonchi. Cardiovascular S1/S2    Abdomen: Soft, non-tender, non-distended with normal bowel sounds. Musculoskeletal: No clubbing, cyanosis or edema bilaterally. Skin: Normal skin color. No rashes or lesions.   Neurologic: Awake, cooperative    Reviewed EKG and CXR personally    CBC:   Recent Labs     03/20/20  1154   WBC 13.0*   RBC 3.83   HGB 10.9*   HCT 34.3*   MCV 89.6   RDW 17.7*        BMP:   Recent Labs     03/20/20  1154      K 4.0   CL 95*   CO2 21*   BUN 31*   CREATININE 1.3* LFT:  Recent Labs     20  1154   PROT 6.3*   ALKPHOS 72   ALT 9   AST 25   BILITOT 0.7   LIPASE 11*     CE:  Recent Labs     20  1154   TROPONINI <0.01     PT/INR: No results for input(s): INR, APTT in the last 72 hours. BNP: No results for input(s): BNP in the last 72 hours. ESR: No results found for: SEDRATE  CRP: No results found for: CRP  D Dimer: No results found for: DDIMER  Folate and B12:   Lab Results   Component Value Date    [id number] 404 2019   ,   Lab Results   Component Value Date    FOLATE 16.5 2019     Lactic Acid:   Lab Results   Component Value Date    LACTA 1.2 10/30/2019     Thyroid Studies:   Lab Results   Component Value Date    TSH 3.340 10/30/2019    Q1ABIKU 8.5 10/30/2019       Oupatient labs:  Lab Results   Component Value Date    TSH 3.340 10/30/2019    INR 1.0 10/30/2019       Urinalysis:    Lab Results   Component Value Date    NITRU POSITIVE 2020    WBCUA >20 2020    BACTERIA MANY 2020    RBCUA 5-10 2020    BLOODU MODERATE 2020    SPECGRAV 1.025 2020    GLUCOSEU Negative 2020       Imaging:  Ct Abdomen Pelvis W Iv Contrast Additional Contrast? None    Result Date: 3/20/2020  Patient MRN:  [id number] : 1945 Age: 76 years Gender: Male Order Date:  3/20/2020 11:45 AM EXAM: CT ABDOMEN PELVIS W IV CONTRAST Number of images 355. Contrast. Isovue-370, 110 mL intravenously. Technique: Low-dose CT  acquisition technique included one of following options; 1 . Automated exposure control, 2. Adjustment of MA and or KV according to patient's size or 3. Use of iterative reconstruction. INDICATION:  ab pain. hx of liver cancer. ab pain. hx of liver cancer. COMPARISON: Previous CT scan 2019 and chest radiograph 3/20/2020 FINDINGS: The lung bases demonstrate cardiomegaly with coronary artery calcification. There is atelectasis and large pleural effusion in the right lung base.  There is a focal masslike infiltrative density in the right perihilar region measuring 4.6 x 3.3 cm which may represent a malignant mass versus focal consolidation. Note is made of a large infiltrative mass involving the posterior right hepatic lobe measuring 6 x 10 cm. Additional multiple hypodense lesions are identified in the left and right hepatic lobe measuring up to 4.5 x 2.2 cm with a rim of irregular thick soft tissue density surrounding the right hepatic lobe laterally. Gallbladder is absent. Spleen, pancreas, and adrenals are normal. A previous noted heterogeneous mass involving the superior pole of the left kidney currently measures 6.2 x 6.3 cm concerning for malignancy. Multiple additional cystic lesions are identified in the kidneys with the largest one in the right kidney measuring 6 x 7 cm. There is mild hydronephrosis. Moderately enlarged retroperitoneal lymphadenopathy are noted with the lymph nodes measuring up to 1.3 cm. Pelvis. The prostate gland is prominent and heterogeneous measuring 5.7 x 5.5 cm with  mass effect on the urinary bladder which is partially collapsed with a Milner catheter with  diffuse mural thickening. There is a large soft tissue mass involving the rectum extending to the right ischiorectal fossa measuring 6 x 6.5 cm. Moderately enlarged lymph nodes are identified in the inguinal region with lymph nodes measuring up to 1.6 cm. Note is made of extensive sclerotic lesions in the lumbosacral spine with involvement of the iliac bones concerning for bone metastasis. Diffuse metastatic malignancy in the abdomen and pelvis with the multiple hypodense infiltrative hepatic lesions with the rim of soft tissue mass surrounding the liver concerning for diffuse hepatic malignancy. Large necrotic mass in the left kidney concerning for renal cell carcinoma with the moderately enlarged retroperitoneal and inguinal lymphadenopathy. A large lobulated rectal mass concerning for malignancy.  There is also enlarged prostate gland concerning for malignancy with possible malignancy involving the urinary bladder which is  thickened with hydronephrosis. Infiltrates and pleural effusion right lung base with a masslike density in the right lung base which may represent focal consolidation versus malignancy. Consider PET CT scan. Extensive skeletal metastasis. ALERT:  THIS IS AN ABNORMAL REPORT    Xr Chest Portable    Result Date: 3/20/2020  Patient MRN:  [id number] : 1945 Age: 76 years Gender: Male Order Date:  3/20/2020 11:45 AM EXAM: XR CHEST PORTABLE one image INDICATION:  possible sepsis possible sepsis COMPARISON: 2019 FINDINGS: There is borderline cardiac size. There is patchy and masslike infiltrates in the right perihilar region and right lung base with a large right pleural effusion. A  small left pleural effusion is present. Patchy and masslike infiltrates and pleural effusions in the right lung base concerning for complicated pneumonia. Underlying malignancy is not excluded. Surveillance preferably by CT scan is recommended.       ASSESSMENT:    Pneumonia, possible relation to gram-negatives  Possible associated postobstructive pneumonia  Possible sepsis  Urinary tract infection  Hepatic carcinoma by history  Renal mass  Loculated rectal mass        PLAN:    Piperacillin/tazobactam IV  Vancomycin IV pharmacy to dose  Blood cultures  Urine culture  ID consultation  Pulmonary consultation  Procalcitonin>100      Diet: No diet orders on file  Code Status: Prior    PT/OT Eval Status: [] Ordered [] Evaluation noted [x] Not applicable  DVT Prophylaxis: [x]Lovenox []Heparin []PCD [] [address] []Encouraged ambulation    Disposition: [x]Med/Surg [] Intermediate [] ICU/CCU  Admit status: [] Observation [x] Inpatient     +++++++++++++++++++++++++++++++++++++++++++++++++  Lore Camarillo MD, Hospitalist  +++++++++++++++++++++++++++++++++++++++++++++++++  NOTE: This report was transcribed using voice recognition software. Every effort was made to ensure accuracy; however, inadvertent computerized transcription errors may be present.

## 2020-03-20 NOTE — CONSULTS
GENERAL SURGERY  CONSULT NOTE  3/20/2020    Physician Consulted: Dr. Yani Powell  Reason for Consult: Rectal mass  Referring Physician: Dr. Negrito Longoria    JEAN-PAUL Puga is a 76 y.o. male who was admitted today from [facility] for fevers and fatigue. He was found to have pneumonia and UTI. Douglas Noel He is got a past medical history of metastatic prostate cancer. Per notes he had a colonoscopy on 11/1 that showed extensive radiation proctosigmoiditis which was treated with APC. CT scan today shows multiple hepatic lesions, large necrotic mass in the left kidney, lobulated rectal mass, enlarged prostate and purulent effusions on the right and possible lung mass. Per significant other he had a colonoscopy 2 weeks ago at [facility] by Dr. Monica Landau. He follows with Dr. Joy Butler last appointment was Monday during which time he received Keytruda and Oneita Hof. Per significant other this renal mass is previously been evaluated but did not show cancer. Past Medical History:   Diagnosis Date    Arthritis     Asthma     GIB (gastrointestinal bleeding)     History of blood transfusion     Hypertension     Paroxysmal atrial flutter ([address] 4/21/2019    Primary prostate cancer with metastasis from prostate to other site [facility]) 03/2018    already had metastasized.      Sleep apnea        Past Surgical History:   Procedure Laterality Date    CHOLECYSTECTOMY      COLON SURGERY      scar tissue removed     COLONOSCOPY N/A 4/24/2019    COLONOSCOPY DIAGNOSTIC performed by Redd Schaffer MD at [address] COLONOSCOPY N/A 11/1/2019    COLONOSCOPY CONTROL HEMORRHAGE performed by Saundra Worley DO at [facility]  2/3/2020    COLONOSCOPY CONTROL HEMORRHAGE performed by Saundra Worley DO at [address] N/A 12/9/2019    FLEXIBLE SIGMOIDOSCOPY, CONTROL HEMORRHAGE performed by Saundra Worley DO at [facility] N/A 2/3/2020    SIGMOIDOSCOPY [address] (CPT [id number]) performed by Tico Quintanilla DO at [address] [facility] [address]  12/09/2019    SINUS SURGERY         Medications Prior to Admission    Prior to Admission medications    Medication Sig Start Date End Date Taking? Authorizing Provider   mirtazapine (REMERON) 15 MG tablet Take 15 mg by mouth nightly   Yes Historical Provider, MD   amLODIPine (NORVASC) 5 MG tablet Take 5 mg by mouth daily   Yes Historical Provider, MD   Omega-3 Fatty Acids (FISH OIL) 1000 MG CAPS Take 3,000 mg by mouth daily   Yes Historical Provider, MD   calcium carbonate 600 MG TABS tablet Take 1 tablet by mouth 2 times daily   Yes Historical Provider, MD   doxycycline hyclate (VIBRA-TABS) 100 MG tablet Take 100 mg by mouth 2 times daily 3/19/20 3/28/20 Yes Historical Provider, MD   fentaNYL (DURAGESIC) 25 MCG/HR Place 1 patch onto the skin every 72 hours.    Yes Historical Provider, MD   acetaminophen (TYLENOL) 325 MG tablet Take 650 mg by mouth every 4 hours as needed for Pain   Yes Historical Provider, MD   vitamin C (ASCORBIC ACID) 500 MG tablet Take 500 mg by mouth daily    Yes Historical Provider, MD   Cholecalciferol (VITAMIN D3) 75 MCG (3000 UT) TABS Take 3,000 Units by mouth 2 times daily    Yes Historical Provider, MD   metoprolol (LOPRESSOR) 100 MG tablet Take 100 mg by mouth 2 times daily    Yes Historical Provider, MD   amiodarone (CORDARONE) 200 MG tablet Take 200 mg by mouth daily   Yes Historical Provider, MD   ondansetron (ZOFRAN) 4 MG tablet Take 4 mg by mouth daily as needed for Nausea or Vomiting    Yes Historical Provider, MD   oxybutynin (DITROPAN-XL) 10 MG extended release tablet Take 10 mg by mouth daily as needed (Bladder Spasms)   Yes Historical Provider, MD   tamsulosin (FLOMAX) 0.4 MG capsule Take 0.8 mg by mouth nightly    Yes Historical Provider, MD   enzalutamide (XTANDI) 40 MG capsule Take 160 mg by mouth Daily with supper    Yes Historical Provider, MD montelukast (SINGULAIR) 10 MG tablet Take 10 mg by mouth nightly   Yes Historical Provider, MD       Allergies   Allergen Reactions    Adhesive Tape Other (See Comments)     \"Tears Skin Off\"    Iodine      Patient states he has no allergy to this Shellfish allergy     Irbesartan     Lisinopril Other (See Comments)     \"Cough\"    Shellfish-Derived Products Diarrhea and Nausea And Vomiting       No family history on file. Social History     Tobacco Use    Smoking status: Never Smoker    Smokeless tobacco: Never Used   Substance Use Topics    Alcohol use: No     Comment:      Drug use: No         Review of Systems: pertinent ROS listed in HPI, all others negative       PHYSICAL EXAM:    Vitals:    03/20/20 1616   BP: 138/72   Pulse: 85   Resp: 20   Temp: 96.7 °F (35.9 °C)   SpO2: 98%       GENERAL:  NAD. A&Ox1. HEAD:  Normocephalic. Atraumatic. EYES:   No scleral icterus. PERRL. LUNGS: On 2 L nasal cannula  CARDIOVASCULAR: RR  ABDOMEN:  Soft, non-distended, non-tender. No guarding, rigidity, rebound. EXTREMITIES:   MAEx4. Atraumatic. No LE edema. SKIN:  Warm and dry  NEUROLOGIC:  GCS 14  RECTAL: No hemorrhoids noted, brown stool, mass felt on the anterior to 2:00 portion of the rectum/prostate, mildly tender      ASSESSMENT/PLAN:  76 y.o. male with metastatic prostate cancer and metastatic neuroendocrine cancer    Patient follows with Dr. Aramis Aggarwal and Dr. Светлана Arteaga colonoscopy records from Dr. Aramis Aggarwal office from 2 weeks ago  Obtain cancer records from Dr. Mae Mahnomen  No signs of obstruction, no plans for acute surgical intervention    Plan discussed with Dr. Boy Stewart.     Deepak Adrian MD  Resident, PGY-2  3/20/2020  7:27 PM

## 2020-03-20 NOTE — CARE COORDINATION
Social Work [address] [place] [address] Planning:    Pt presents to the ED with fever (101.5), elevated WBC, and UTI. Pt is from [address] [place] [address] and has hx of cancer with metastasis. Per facility liaison, pt was at a \"skilled\" level of care, not a bed hold, but able to return. SW to follow.

## 2020-03-21 NOTE — PROGRESS NOTES
Hospital Medicine Progress Note      Date of Admission: 3/20/2020  Hospital day # 1    Course: Follow-up on UTI, pneumonia, sepsis gross, metastatic cancer    Subjective      She feels weak and tired      Exam:    /79   Pulse 85   Temp 97.3 °F (36.3 °C) (Temporal)   Resp 18   Ht 5' 10\" (1.778 m)   Wt 172 lb (78 kg)   SpO2 95%   BMI 24.68 kg/m²     General appearance: No apparent distress, appears stated age and cooperative. HEENT: Pupils equal, round, and reactive to light. Conjunctivae/corneas clear. Neck: Supple. No jugular venous distention. Trachea midline. Respiratory:  Normal respiratory effort. Clear to auscultation, bilaterally without Rales/Wheezes/Rhonchi. Cardiovascular: Regular rate and rhythm with normal S1/S2 without murmurs, rubs or gallops. Abdomen: Soft, non-tender, non-distended with normal bowel sounds. Musculoskeletal: No clubbing, cyanosis or edema bilaterally. Brisk capillary refill. 2+ lower extremity pulses (dorsalis pedis).    Skin:  No rashes    Neurologic: awake, alert and following commands     Medications:  Reviewed    Infusion Medications    sodium chloride 125 mL/hr at 03/20/20 2235     Scheduled Medications    amiodarone  200 mg Oral Daily    calcium elemental  1 tablet Oral BID    enzalutamide  160 mg Oral Dinner    montelukast  10 mg Oral Nightly    vitamin C  500 mg Oral Daily    sodium chloride flush  10 mL Intravenous 2 times per day    enoxaparin  40 mg Subcutaneous Daily    ipratropium  0.5 mg Nebulization Q4H WA    piperacillin-tazobactam  3.375 g Intravenous Q8H    And    sodium chloride   Intravenous Q8H    vancomycin  750 mg Intravenous Q12H     PRN Meds: oxybutynin, sodium chloride flush, acetaminophen **OR** acetaminophen, polyethylene glycol, promethazine **OR** ondansetron    I/O    Intake/Output Summary (Last 24 hours) at 3/21/2020 0823  Last data filed at 3/21/2020 0636  Gross per 24 hour   Intake 1300 ml   Output 1300 ml   Net 0 ml Labs:   Recent Labs     03/20/20  1154   WBC 13.0*   HGB 10.9*   HCT 34.3*          Recent Labs     03/20/20  1154      K 4.0   CL 95*   CO2 21*   BUN 31*   CREATININE 1.3*   CALCIUM 9.4       Recent Labs     03/20/20  1154   PROT 6.3*   ALKPHOS 72   ALT 9   AST 25   BILITOT 0.7   LIPASE 11*       No results for input(s): INR in the last 72 hours. Recent Labs     03/20/20  1154   TROPONINI <0.01       Other labs:  Lab Results   Component Value Date    TSH 3.340 10/30/2019    INR 1.0 10/30/2019       Radiology:  Imaging studies reviewed today. ASSESSMENT:    Pneumonia, possible relation to gram-negatives  R Pleural effusion  Possible sepsis  Urinary tract infection catheter related  Hepatic carcinoma by history  Renal mass  Rectal mass by CT         PLAN:    S/p Thoracentesis 3/21/20  Piperacillin/tazobactam IV  Vancomycin IV pharmacy to dose  Continue to follow Blood cultures  Follow with Urine culture  ID following  Pulmonary following  Procalcitonin>100        Diet: DIET GENERAL;  Code Status: Full Code    PT/OT Eval Status: [] Ordered [] Evaluation noted [x] Not applicable    DVT Prophylaxis: [x]Lovenox []Heparin []PCD [] [address] []Encouraged ambulation []N/A    Likely disposition when able:  [x]Home [] Home with [facility] [] SNF/SWETHA [] Acute Rehab [] LTAC []Other    +++++++++++++++++++++++++++++++++++++++++++++++++  Eyad Soriano MD, Hospitalist  +++++++++++++++++++++++++++++++++++++++++++++++++  NOTE: This report was transcribed using voice recognition software.  Every effort was made to ensure accuracy; however, inadvertent computerized transcription errors may be present.

## 2020-03-21 NOTE — PROCEDURES
Thoracentesis  Humera Andre 76 y.o. male [id number] 3/21/2020    Indication: Pleural Effusion    Performed by:Samanta Chacon    Consent: Verbal consent obtained. Written consent obtained. Risks and benefits: risks, benefits and alternatives were discussed including bleeding and pneumothorax  Consent given by: the patient  Patient understanding: patient states understanding of the procedure being performed  Time out: Immediately prior to procedure a \"time out\" was called to verify the correct  Patient was made up in sitting position and ultrasound was done and site of maximum fluid collection was marked on right  Preparation: Patient was prepped and draped in the usual sterile fashion. Local anesthesia used: yes  Local anesthetic: lidocaine 2% without epinephrine  Once there was free fluid return needle was changed with Arrow's thoracentesis tray's soft catheter and close to 175cc of straw colored fluid drained from the on right pleural space. Patient tolerated the procedure well  Complications: None apparent  Chest Xray post procedure ordered to rule out pneumothorax. Following Labs Sent;  LDH, Glucose, total Protein,  Cell count and Diff  Cup #1: Gram Stain and Cultures and Sensitivity  Cup #2 : Cytology      Alexis Denson MD, F.C.C.P.

## 2020-03-21 NOTE — PROGRESS NOTES
GENERAL SURGERY  DAILY PROGRESS NOTE    Date:3/21/2020       WNWB:[id number]  Patient Name:Jerod Samuel     YOB: 1945     Age:74 y.o. Subjective:  Denies pain    Objective:  /79   Pulse 85   Temp 97.3 °F (36.3 °C) (Temporal)   Resp 18   Ht 5' 10\" (1.778 m)   Wt 172 lb (78 kg)   SpO2 95%   BMI 24.68 kg/m²   Temp (24hrs), Av.3 °F (37.4 °C), Min:96.7 °F (35.9 °C), Max:103 °F (39.4 °C)      I/O (24Hr): Intake/Output Summary (Last 24 hours) at 3/21/2020 0906  Last data filed at 3/21/2020 0636  Gross per 24 hour   Intake 1300 ml   Output 1300 ml   Net 0 ml       GENERAL:  No acute distress. Very difficult to arouse. LUNGS:  No cough. Nonlabored breathing on [facility]  CARDIOVASC:  Normal rate, no cyanosis. ABDOMEN:  Soft, non-distended, non-tender. No guarding / rigidity / rebound.      Assessment:  76 y.o. male with metastatic prostate cancer and metastatic neuroendocrine cancer    Plan:  - recommend oncology and palliative consults  - recommend transfer to intermediate floor    Electronically signed by Lenin Steiner MD on 3/21/2020 at 9:06 AM

## 2020-03-21 NOTE — PROGRESS NOTES
Per  [facility], patient should improve after thoracentesis done and shouldn't need a monitor bed at this time.

## 2020-03-21 NOTE — PROGRESS NOTES
Patient  Robby Venegas   Date of admission 3/20/20    CC: fatigue, fever, dry cough    HPI: Robby Venegas is a 76y.o. year old male with known history of metastatic prostate cancer who now presents with about 2 weeks of progressive fatigue, and some dry cough as well as progressive shortness of breath. He also developed fever about 2 days prior to admission and has been admitted with UTI. Evaluation to date shows moderate right pleural effusion, probable right lung mass, probable multiple metastasis in liver, left renal mass, moderately enlarged retroperitoneal nodes, prominent prostate and a large soft tissue mass involving the rectum. He is a non-smoker and has history of sleep apnea on PAP therapy. Prior to Admission medications    Medication Sig Start Date End Date Taking? Authorizing Provider   mirtazapine (REMERON) 15 MG tablet Take 15 mg by mouth nightly   Yes Historical Provider, MD   amLODIPine (NORVASC) 5 MG tablet Take 5 mg by mouth daily   Yes Historical Provider, MD   Omega-3 Fatty Acids (FISH OIL) 1000 MG CAPS Take 3,000 mg by mouth daily   Yes Historical Provider, MD   calcium carbonate 600 MG TABS tablet Take 1 tablet by mouth 2 times daily   Yes Historical Provider, MD   doxycycline hyclate (VIBRA-TABS) 100 MG tablet Take 100 mg by mouth 2 times daily 3/19/20 3/28/20 Yes Historical Provider, MD   fentaNYL (DURAGESIC) 25 MCG/HR Place 1 patch onto the skin every 72 hours.    Yes Historical Provider, MD   acetaminophen (TYLENOL) 325 MG tablet Take 650 mg by mouth every 4 hours as needed for Pain   Yes Historical Provider, MD   vitamin C (ASCORBIC ACID) 500 MG tablet Take 500 mg by mouth daily    Yes Historical Provider, MD   Cholecalciferol (VITAMIN D3) 75 MCG (3000 UT) TABS Take 3,000 Units by mouth 2 times daily    Yes Historical Provider, MD   metoprolol (LOPRESSOR) 100 MG tablet Take 100 mg by mouth 2 times daily    Yes Historical Provider, MD   amiodarone (CORDARONE) 200 MG tablet Take 200 mg by mouth daily   Yes Historical Provider, MD   ondansetron (ZOFRAN) 4 MG tablet Take 4 mg by mouth daily as needed for Nausea or Vomiting    Yes Historical Provider, MD   oxybutynin (DITROPAN-XL) 10 MG extended release tablet Take 10 mg by mouth daily as needed (Bladder Spasms)   Yes Historical Provider, MD   tamsulosin (FLOMAX) 0.4 MG capsule Take 0.8 mg by mouth nightly    Yes Historical Provider, MD   enzalutamide (XTANDI) 40 MG capsule Take 160 mg by mouth Daily with supper    Yes Historical Provider, MD   montelukast (SINGULAIR) 10 MG tablet Take 10 mg by mouth nightly   Yes Historical Provider, MD        has a past medical history of Arthritis, Asthma, GIB (gastrointestinal bleeding), History of blood transfusion, Hypertension, Paroxysmal atrial flutter ([facility] Primary prostate cancer with metastasis from prostate to other site [facility]), and Sleep apnea. has a past surgical history that includes Cholecystectomy; sinus surgery; Colonoscopy (N/A, 4/24/2019); Colonoscopy (N/A, 11/1/2019); Colon surgery; Sigmoidoscopy (12/09/2019); proctosigmoidoscopy (N/A, 12/9/2019); Prostate biopsy; liver biopsy; proctosigmoidoscopy (N/A, 2/3/2020); and Colonoscopy (2/3/2020). reports that he has never smoked. He has never used smokeless tobacco. He reports that he does not drink alcohol or use drugs. Family history of asthma in brother    Allergies Adhesive tape; Iodine; Irbesartan; Lisinopril; and Shellfish-derived products    ROS:  General. + fever prior to admission  Neuro:- c/o headache or focal weakness  Integumentary: no complaint or rash or puruitis  Respiratory: + short of breath on minimal exertion. + dry cough. No chest pain, mucus production, or wheezing  Cardiovascular: No anginal type chest pain or edema.  + heart murmur   GI: No nausea or emesis.   Has had recent constipation  : denies dysuria, difficulty initiating stream  Endocrine: no heat or cold intolerance  Musculosckelatal: no myalgias, arthragias  Psychiatric: no complaint of anxiety or depression    Vitals:    03/21/20 0821   BP: 139/79   Pulse: 85   Resp: 18   Temp: 97.3 °F (36.3 °C)   SpO2: 95%       Intake/Output Summary (Last 24 hours) at 3/21/2020 1024  Last data filed at 3/21/2020 0636  Gross per 24 hour   Intake 1300 ml   Output 1300 ml   Net 0 ml       Exam/Objective:   WDWN male in no acute distress  Skin: warm, dry, without rash  HEENT: PERRL, face symmetric, normal slighlty dry oral mucosa, normal dental repair. No neck mass, adenopathy, or thyromegaly  Chest: symmetric with decreased air entry right base  decrease resonance to percussion also in right base  Lungs: without rales, rhonchi, or wheezes  Cardiac: S1, S2 normal.  III/VI systolic murmur rt and Lt upper sternal border  Abdomen: distended, tympatnic, soft, non-tender, active bowel sounds. Extremities: No clubbing, cyanosis, or edema  Neuro: CN II - XII grossly intact. Alert, moves all extremities symmetrically.  Oriented to person, time place  Psych:  Normal affect and insight    CBC with Differential:    Lab Results   Component Value Date    WBC 9.6 03/21/2020    RBC 3.28 03/21/2020    HGB 9.2 03/21/2020    HCT 29.6 03/21/2020     03/21/2020    MCV 90.2 03/21/2020    MCH 28.0 03/21/2020    MCHC 31.1 03/21/2020    RDW 17.9 03/21/2020    LYMPHOPCT 2.2 03/20/2020    MONOPCT 7.6 03/20/2020    MYELOPCT 1.7 04/21/2019    BASOPCT 0.1 03/20/2020    MONOSABS 0.99 03/20/2020    LYMPHSABS 0.28 03/20/2020    EOSABS 0.00 03/20/2020    BASOSABS 0.01 03/20/2020     CMP:    Lab Results   Component Value Date     03/21/2020    K 3.2 03/21/2020    CL 95 03/21/2020    CO2 21 03/21/2020    BUN 28 03/21/2020    CREATININE 1.2 03/21/2020    GFRAA >60 03/21/2020    LABGLOM 59 03/21/2020    GLUCOSE 198 03/21/2020    PROT 5.8 03/21/2020    LABALBU 2.8 03/21/2020    CALCIUM 8.4 03/21/2020    BILITOT 0.6 03/21/2020    ALKPHOS 67 03/21/2020    AST 13 03/21/2020    ALT 8 03/21/2020 PT/INR:    Lab Results   Component Value Date    PROTIME 11.6 10/30/2019    INR 1.0 10/30/2019     Results for Karan Haines (MRN [id number]) as of 3/21/2020 10:26   Ref. Range 3/20/2020 11:54   Color, UA Latest Ref Range: Straw/Yellow  Yellow   Clarity, UA Latest Ref Range: Clear  Clear   Glucose, UA Latest Ref Range: Negative mg/dL Negative   Bilirubin, Urine Latest Ref Range: Negative  Negative   Ketones, Urine Latest Ref Range: Negative mg/dL TRACE (A)   Specific Adams, UA Latest Ref Range: 1.005 - 1.030  1.025   Blood, Urine Latest Ref Range: Negative  MODERATE (A)   pH, UA Latest Ref Range: 5.0 - 9.0  5.5   Protein, UA Latest Ref Range: Negative mg/dL 100 (A)   Urobilinogen, Urine Latest Ref Range: <2.0 E.U./dL 0.2   Nitrite, Urine Latest Ref Range: Negative  POSITIVE (A)   Leukocyte Esterase, Urine Latest Ref Range: Negative  MODERATE (A)   WBC, UA Latest Ref Range: 0 - 5 /HPF >20 (A)   RBC, UA Latest Ref Range: 0 - 2 /HPF 5-10 (A)   Bacteria, UA Latest Ref Range: None Seen /HPF MANY (A)       IMPRESSION:  1. Sizable right pleural effusion with right hilar mass effect - strong likelihood of malignancy  2. Shortness of breath due to effusion and possible partial airway compression  3. UTI  4. Metastatic prostate cancer    Plan:   1. Diagnostic, therapeutic thoracentesis   I have discussed procedure, benefits and risks (including pain, bleeding, infection, pneumothorax) in detail with patient who is agreeable to proceeding.      Yissel Nayak MD, FACP, FCCP, FAASM

## 2020-03-21 NOTE — CONSULTS
[facility]     Consult Note                                 [address] [facility] [address], [place], [address] [place]                   Phone [phone]     Fax [phone]        Date:   3/21/2020  Patient name:  Susan Packer  Date of admission:  3/20/2020 11:23 AM  MRN:   [id number]  YOB: 1945    Reason for Consult: Pneumonia/UTI    CC:   Chief Complaint   Patient presents with    Fever     fevers and fatigue x 1 week per [facility] temp 101.5 Hx Liver CA    Fatigue       HISTORY OF PRESENT ILLNESS:                The patient is a 76 y.o. male with known history of metastatic prostate cancer who is coming from skilled nursing home with chief complaints of not feeling good. History is limited at this time as patient is having a lot of fatigue and most of the history obtained is from medical records. He was having cough, shortness of breath febrile, he was diagnosed with UTI at the nursing home, CT scan of the abdomen concerning for metastatic malignancy in the abdomen, pelvis, hypodense lesion in the liver, large necrotic mass in the left kidney, large lobulated rectal mass, infiltrates and pleural effusion right lung with a masslike lesion in the right lung. Pulmonology has evaluated the patient ended right-sided thoracenteses does not show enough neutrophils concerning for empyema        Past Medical History:   has a past medical history of Arthritis, Asthma, GIB (gastrointestinal bleeding), History of blood transfusion, Hypertension, Paroxysmal atrial flutter ([address] Primary prostate cancer with metastasis from prostate to other site [facility]), and Sleep apnea. Past Surgical History:   has a past surgical history that includes Cholecystectomy; sinus surgery; Colonoscopy (N/A, 4/24/2019); Colonoscopy (N/A, 11/1/2019);  Colon surgery; Sigmoidoscopy (12/09/2019); proctosigmoidoscopy (N/A, 12/9/2019); Prostate biopsy; liver biopsy; proctosigmoidoscopy (N/A, 2/3/2020); and Colonoscopy (2/3/2020). Home Medications:    Prior to Admission medications    Medication Sig Start Date End Date Taking? Authorizing Provider   mirtazapine (REMERON) 15 MG tablet Take 15 mg by mouth nightly   Yes Historical Provider, MD   amLODIPine (NORVASC) 5 MG tablet Take 5 mg by mouth daily   Yes Historical Provider, MD   Omega-3 Fatty Acids (FISH OIL) 1000 MG CAPS Take 3,000 mg by mouth daily   Yes Historical Provider, MD   calcium carbonate 600 MG TABS tablet Take 1 tablet by mouth 2 times daily   Yes Historical Provider, MD   doxycycline hyclate (VIBRA-TABS) 100 MG tablet Take 100 mg by mouth 2 times daily 3/19/20 3/28/20 Yes Historical Provider, MD   fentaNYL (DURAGESIC) 25 MCG/HR Place 1 patch onto the skin every 72 hours.    Yes Historical Provider, MD   acetaminophen (TYLENOL) 325 MG tablet Take 650 mg by mouth every 4 hours as needed for Pain   Yes Historical Provider, MD   vitamin C (ASCORBIC ACID) 500 MG tablet Take 500 mg by mouth daily    Yes Historical Provider, MD   Cholecalciferol (VITAMIN D3) 75 MCG (3000 UT) TABS Take 3,000 Units by mouth 2 times daily    Yes Historical Provider, MD   metoprolol (LOPRESSOR) 100 MG tablet Take 100 mg by mouth 2 times daily    Yes Historical Provider, MD   amiodarone (CORDARONE) 200 MG tablet Take 200 mg by mouth daily   Yes Historical Provider, MD   ondansetron (ZOFRAN) 4 MG tablet Take 4 mg by mouth daily as needed for Nausea or Vomiting    Yes Historical Provider, MD   oxybutynin (DITROPAN-XL) 10 MG extended release tablet Take 10 mg by mouth daily as needed (Bladder Spasms)   Yes Historical Provider, MD   tamsulosin (FLOMAX) 0.4 MG capsule Take 0.8 mg by mouth nightly    Yes Historical Provider, MD   enzalutamide (XTANDI) 40 MG capsule Take 160 mg by mouth Daily with supper    Yes Historical Provider, MD   montelukast (SINGULAIR) 10 MG tablet Take 10 mg by mouth nightly   Yes Historical Provider, MD       Allergies:  Adhesive tape; Iodine; Irbesartan; Lisinopril; and Shellfish-derived products    Social History:   reports that he has never smoked. He has never used smokeless tobacco. He reports that he does not drink alcohol or use drugs. Family History: family history is not on file. REVIEW OF SYSTEMS:    12 point ROS has been done and pertinent neg and positive has been included in HPI and rest are non contributory      PHYSICAL EXAM:    /79   Pulse 85   Temp 97.3 °F (36.3 °C) (Temporal)   Resp 18   Ht 5' 10\" (1.778 m)   Wt 172 lb (78 kg)   SpO2 95%   BMI 24.68 kg/m²    VENT SETTINGS:        General appearance: Alert, Awake, Oriented times 3, no distress  Skin: Warm and dry  Eyes: Pink palpebral conjunctivae. PERRL  Ears: External ears normal.  Nose/Sinuses: Nares normal. Septum midline. Mucosa normal. No sinus tenderness. Oropharynx: Oropharynx clear with no exudates seen  Neck: Neck supple. No jugular venous distension, lymphadenopathy or thyromegaly Trachea midline  Lungs: Lungs clear to auscultation bilaterally. No rhonchi, crackles or wheezes  Heart: S1 S2  , grade 2 x 6 systolic murmur loudest at the apex   abdomen: Abdomen soft, non-tender. BS normal. No masses, organomegaly  Extremities: No edema, Peripheral pulses palpable  Musculoskeletal: Muscular strength appears intact.  No joint effusion, tenderness, swelling or warmth      DATA:    Labs:     Last 3 CBC:  Recent Labs     03/20/20  1154 03/21/20  0644   WBC 13.0* 9.6   RBC 3.83 3.28*   HGB 10.9* 9.2*    249   MPV 8.4 8.5       Last 3 BMP  Recent Labs     03/20/20  1154 03/21/20  0644    134   K 4.0 3.2*   CL 95* 95*   CO2 21* 21*   BUN 31* 28*   CREATININE 1.3* 1.2   GLUCOSE 139* 198*   CALCIUM 9.4 8.4*       LIVER PROFILE:  Recent Labs     03/20/20  1154 03/21/20  0644   AST 25 13   ALT 9 8   LABALBU 3.3* 2.8*       URINARY CATHETER OUTPUT ([facility]):  Urethral Catheter Straight-tip 16 fr-Output (mL): 1300 mL  Microbiology :  No results for input(s): BC in the last 72 hours. No results for input(s): [place] in the last 72 hours. Recent Labs     03/20/20  2135   LABURIN >100,000 CFU/ml  Identification and sensitivity to follow       No results for input(s): CULTRESP in the last 72 hours. No results for input(s): WNDABS in the last 72 hours. Radiology :  US THORACENTESIS   Final Result   Ultrasound guidance for right thoracentesis. CT ABDOMEN PELVIS W IV CONTRAST Additional Contrast? None   Final Result   Diffuse metastatic malignancy in the abdomen and pelvis with the   multiple hypodense infiltrative hepatic lesions with the rim of soft   tissue mass surrounding the liver concerning for diffuse hepatic   malignancy. Large necrotic mass in the left kidney concerning for renal cell   carcinoma with the moderately enlarged retroperitoneal and inguinal   lymphadenopathy. A large lobulated rectal mass concerning for malignancy. There is also   enlarged prostate gland concerning for malignancy with possible   malignancy involving the urinary bladder which is  thickened with   hydronephrosis. Infiltrates and pleural effusion right lung base with a masslike   density in the right lung base which may represent focal consolidation   versus malignancy. Consider PET CT scan. Extensive skeletal metastasis. ALERT:  THIS IS AN ABNORMAL REPORT      XR CHEST PORTABLE   Final Result   Patchy and masslike infiltrates and pleural effusions in the right   lung base concerning for complicated pneumonia. Underlying malignancy   is not excluded. Surveillance preferably by CT scan is recommended.       XR CHEST PORTABLE    (Results Pending)           Assessment and Plan:      · UTI, catheter related  · Concern for metastatic malignancy-involving lungs and liver  · Right-sided pleural effusion status post thoracentesis done on 3/21/2020  · Questionable pneumonia    Plan  -Keep on vancomycin and Zosyn  -Follow blood, urine cultures as well as results of pleural fluid analysis  -Pulmonary follow-up                Claudette Gamble MD

## 2020-03-21 NOTE — PROGRESS NOTES
Dr. Danilo Rollins informed via perfect serve that patient's potassium was 3.2 today. Dr also informed that patient has on a 25 MCG Fentanyl patch dated for the 25 th and he was scheduled for a Lupron injection.

## 2020-03-21 NOTE — PROGRESS NOTES
Pharmacy Consultation Note  (Antibiotic Dosing and Monitoring)    Initial consult date: 3-  Consulting physician: Kiko  Drug: Vancomycin  Indication: UTI    Age/  Gender Height Weight IBW Dosing weight  Allergy Information   74 y.o./male 5' 10\" (177.8 cm) 178 lb 9.2 oz (81 kg)     Ideal body weight: 73 kg (160 lb 15 oz)  Adjusted ideal body weight: 75 kg (165 lb 5.8 oz)    Adhesive tape; Iodine; Irbesartan; Lisinopril; and Shellfish-derived products      Temp (24hrs), Av.7 °F (37.1 °C), Min:96.7 °F (35.9 °C), Max:100.7 °F (38.2 °C)          Date  WBC BUN SCr CrCl  (mL/min) Drug/Dose Time   Given Level(s)   (Time) Comments   3/20 13 31 1.3  51 Vancomycin 1500 mg IV x 1 1842     3/21 --- 28 1.2 56 Vancomycin 750 mg IV q 12 hr 0552  <1800>     3/22     Vancomycin 750 mg IV q 12 hr <0600> Trough @ 0530 Hold vancomycin if trough > 20 mcg/mL                    Intake/Output Summary (Last 24 hours) at 3/20/2020 1726  Last data filed at 3/20/2020 1555  Gross per 24 hour   Intake 1300 ml   Output --   Net 1300 ml       Historical Cultures:  Organism   Date Value Ref Range Status   2020 GNR oxidase positive (A)  Preliminary     No results for input(s): BC in the last 72 hours. Cultures:  available culture and sensitivity results were reviewed in [facility]  3/20 Blood #1 Pending  3/20 Urine  Pending  3/20 Blood #1 Pending   3/20 Blood #2 Pending     Assessment:  · 76 y.o. male with a PMH of arthritis, asthma, GIB, HTN, atrial flutter, and metastatic prostate cancer presented to [facility] on 3/20 from [facility] complaining of fatigue and fever (101.5F). Admitted for pneumonia and UTI. Vancomycin has been initiated. · Estimated CrCl = 56 mL/min  · Goal trough level = 15-20 mcg/mL  · sCr = 1.2 mg/dL; baseline ~ 0.8 mg/dL    Plan:  · Vancomycin 750 mg Q12H. · Will check vancomycin trough level 30 minutes prior to scheduled dose tomorrow morning.   · Monitor renal function   · Clinical pharmacy to follow      Jason Garcia PharmD 3/21/2020 10:06 AM

## 2020-03-22 NOTE — PROGRESS NOTES
Pharmacist will let clinical pharmacist know that Vanc trough level this am was drawn when IV Vanc was infusing. Level 28.7.

## 2020-03-22 NOTE — PROGRESS NOTES
Hospital Medicine Progress Note      Date of Admission: 3/20/2020  Hospital day # 2    Course: Follow-up on UTI, pneumonia, sepsis gross, metastatic cancer    Subjective    Patient more awake and cooperative  Refers no shortness of breath. Events overnight as per nurse      Exam:    BP (!) 153/88   Pulse 101   Temp 97.4 °F (36.3 °C) (Temporal)   Resp 18   Ht 5' 10\" (1.778 m)   Wt 172 lb (78 kg)   SpO2 94%   BMI 24.68 kg/m²     General appearance: No apparent distress, appears stated age and cooperative. HEENT: Pupils equal, round, and reactive to light. Conjunctivae/corneas clear. Neck: Supple. No jugular venous distention. Trachea midline. Respiratory:  Normal respiratory effort. Clear to auscultation, bilaterally without Rales/Wheezes/Rhonchi. Cardiovascular: S1/S2  Abdomen: Soft, non-tender, non-distended with normal bowel sounds. Musculoskeletal: No clubbing, cyanosis or edema bilaterally. Brisk capillary refill. 2+ lower extremity pulses (dorsalis pedis).    Skin:  No rashes    Neurologic: awake, alert and following commands     Medications:  Reviewed    Infusion Medications    sodium chloride 125 mL/hr at 03/21/20 1722     Scheduled Medications    fentaNYL  1 patch Transdermal Q72H    potassium chloride  20 mEq Oral BID WC    amiodarone  200 mg Oral Daily    calcium elemental  1 tablet Oral BID    enzalutamide  160 mg Oral Dinner    montelukast  10 mg Oral Nightly    vitamin C  500 mg Oral Daily    sodium chloride flush  10 mL Intravenous 2 times per day    enoxaparin  40 mg Subcutaneous Daily    ipratropium  0.5 mg Nebulization Q4H WA    piperacillin-tazobactam  3.375 g Intravenous Q8H    And    sodium chloride   Intravenous Q8H    vancomycin  750 mg Intravenous Q12H     PRN Meds: oxybutynin, sodium chloride flush, acetaminophen **OR** acetaminophen, polyethylene glycol, promethazine **OR** ondansetron    I/O    Intake/Output Summary (Last 24 hours) at 3/22/2020 0984  Last data Hospitalist  +++++++++++++++++++++++++++++++++++++++++++++++++  NOTE: This report was transcribed using voice recognition software.  Every effort was made to ensure accuracy; however, inadvertent computerized transcription errors may be present.

## 2020-03-22 NOTE — PROGRESS NOTES
Pharmacy Consultation Note  (Antibiotic Dosing and Monitoring)    Initial consult date: 3-  Consulting physician: Kiko  Drug: Vancomycin  Indication: UTI    Age/  Gender Height Weight IBW Dosing weight  Allergy Information   74 y.o./male 5' 10\" (177.8 cm) 178 lb 9.2 oz (81 kg)     Ideal body weight: 73 kg (160 lb 15 oz)  Adjusted ideal body weight: 75 kg (165 lb 5.8 oz)    Adhesive tape; Iodine; Irbesartan; Lisinopril; and Shellfish-derived products      Temp (24hrs), Av.7 °F (37.1 °C), Min:96.7 °F (35.9 °C), Max:100.7 °F (38.2 °C)          Date  WBC BUN SCr CrCl  (mL/min) Drug/Dose Time   Given Level(s)   (Time) Comments   3/20 13 31 1.3  51 Vancomycin 1500 mg IV x 1 1842     3/21 --- 28 1.2 56 Vancomycin 750 mg IV q 12 hr 0552  <1800>     3/22 --- --- --- 56 Vancomycin 750 mg IV q 12 hr 0535      <1800> Trough = 28.7 mcg/mL    Re-draw trough @ 1730 Trough drawn while vanc infusing      Hold vancomycin if trough > 20 mcg/mL                    Intake/Output Summary (Last 24 hours) at 3/20/2020 1726  Last data filed at 3/20/2020 1555  Gross per 24 hour   Intake 1300 ml   Output --   Net 1300 ml   UOP: 0.7 mL/kg/hr per last 24 hours documented    Historical Cultures:  Organism   Date Value Ref Range Status   2020 GNR oxidase positive (A)  Preliminary     No results for input(s): BC in the last 72 hours. Cultures:  available culture and sensitivity results were reviewed in [facility]  3/20 Blood #1 24 hours no growth  3/20 Urine  Pseudomonas aeruginosa  3/20 Blood #1 24 hours no growth   3/20 Blood #2 24 hours no growth    Assessment:  · 76 y.o. male with a PMH of arthritis, asthma, GIB, HTN, atrial flutter, and metastatic prostate cancer presented to [facility] on 3/20 from [facility] complaining of fatigue and fever (101.5F). Admitted for pneumonia and UTI. Vancomycin has been initiated.   · Estimated CrCl = 56 mL/min  · Goal trough level = 15-20 mcg/mL  · sCr = 1.2 mg/dL; baseline ~ 0.8 mg/dL  · Trough this morning = 28.7 mcg/mL, however, RN alerted pharmacy that trough was drawn while vancomycin was infusing. Will have trough re-drawn this afternoon. Plan:  · Vancomycin 750 mg Q12H. · Will check vancomycin trough level 30 minutes prior to scheduled dose this evening.   · Monitor renal function   · Clinical pharmacy to follow      Amilcar Mcmullen PharmD 3/22/2020 9:27 AM

## 2020-03-22 NOTE — PROGRESS NOTES
ID Progress Note                [address] [facility] [address], [place], [address]            Phone [phone]     Fax [phone]      Chief complaint   Fever/fatigue    Subjective:  Fatigue  Not able to talk much  The patient is awake and alert. Tolerating medications. Reports no side effects. Afebrile. 10 ROS otherwise negative unless otherwise specified above. Objective:    Vitals:    03/22/20 1700   BP:    Pulse:    Resp:    Temp:    SpO2: 95%        General appearance: Alert, Awake, Oriented times 3, no distress  Skin: Warm and dry  Eyes: Pink palpebral conjunctivae. PERRL  Ears: External ears normal.  Nose/Sinuses: Nares normal. Septum midline. Mucosa normal. No sinus tenderness. Oropharynx: Oropharynx clear with no exudates seen  Neck: Neck supple. No jugular venous distension, lymphadenopathy or thyromegaly Trachea midline  Lungs: Lungs clear to auscultation bilaterally. No rhonchi, crackles or wheezes  Heart: S1 S2  , grade 2 x 6 systolic murmur loudest at the apex   abdomen: Abdomen soft, non-tender. BS normal. No masses, organomegaly  Extremities: No edema, Peripheral pulses palpable  Musculoskeletal: Muscular strength appears intact.  No joint effusion, tenderness, swelling or warmth    Labs:  Recent Labs     03/20/20  1154 03/21/20  0644   WBC 13.0* 9.6   RBC 3.83 3.28*   HGB 10.9* 9.2*   HCT 34.3* 29.6*   MCV 89.6 90.2   MCH 28.5 28.0   MCHC 31.8* 31.1*   RDW 17.7* 17.9*    249   MPV 8.4 8.5     CMP:    Lab Results   Component Value Date     03/21/2020    K 3.2 03/21/2020    CL 95 03/21/2020    CO2 21 03/21/2020    BUN 28 03/21/2020    CREATININE 1.2 03/21/2020    GFRAA >60 03/21/2020    LABGLOM 59 03/21/2020    GLUCOSE 198 03/21/2020    PROT 5.9 03/21/2020    LABALBU 2.8 03/21/2020    CALCIUM 8.4 03/21/2020    BILITOT 0.6 03/21/2020    ALKPHOS 67 03/21/2020    AST 13 03/21/2020    ALT 8 03/21/2020          Microbiology :  Recent Labs     03/20/20  2149   BC 24 Hours- no growth     Recent Labs     03/20/20  1154   BLOODCULT2 24 Hours- no growth     Recent Labs     03/20/20  2135   LABURIN >100,000 CFU/ml  Identification and sensitivity to follow       No results for input(s): CULTRESP in the last 72 hours. No results for input(s): WNDABS in the last 72 hours. Radiology :  XR CHEST PORTABLE   Final Result      Moderate right-sided pleural effusion small left-sided pleural   effusion      No evidence of airspace consolidation or pulmonary venous congestion         CT HEAD WO CONTRAST   Final Result      NO ACUTE INTRACRANIAL PROCESS         US THORACENTESIS   Final Result   Ultrasound guidance for right thoracentesis. CT ABDOMEN PELVIS W IV CONTRAST Additional Contrast? None   Final Result   Diffuse metastatic malignancy in the abdomen and pelvis with the   multiple hypodense infiltrative hepatic lesions with the rim of soft   tissue mass surrounding the liver concerning for diffuse hepatic   malignancy. Large necrotic mass in the left kidney concerning for renal cell   carcinoma with the moderately enlarged retroperitoneal and inguinal   lymphadenopathy. A large lobulated rectal mass concerning for malignancy. There is also   enlarged prostate gland concerning for malignancy with possible   malignancy involving the urinary bladder which is  thickened with   hydronephrosis. Infiltrates and pleural effusion right lung base with a masslike   density in the right lung base which may represent focal consolidation   versus malignancy. Consider PET CT scan. Extensive skeletal metastasis. ALERT:  THIS IS AN ABNORMAL REPORT      XR CHEST PORTABLE   Final Result   Patchy and masslike infiltrates and pleural effusions in the right   lung base concerning for complicated pneumonia. Underlying malignancy   is not excluded. Surveillance preferably by CT scan is recommended.             URINARY CATHETER OUTPUT ([facility]):  Urethral Catheter Straight-tip 16 fr-Output (mL): 350 mL          Assessment and Plan:      · UTI, catheter related, pseudomonas aeruginosa (zosyn sensitive)  · Concern for metastatic malignancy-involving lungs and liver  · Right-sided pleural effusion status post thoracentesis done on 3/21/2020  · Questionable pneumonia     Plan  -Keep on vancomycin and Zosyn, van dosing by pharmacy  -Follow blood, urine cultures as well as results of pleural fluid analysis, not concerning for infection  -Pulmonary follow-up           Electronically signed by Marciano Jovel MD on 3/22/2020 at 5:13 PM

## 2020-03-22 NOTE — CONSULTS
Palliative Care Department  Palliative Care Initial Consult  Provider: [facility] Day: 3    Referring Provider Dale Walker MD    Reason for Consult:  []  Code status Discussion  [x]  Assist with goals of care  [x]  Psychosocial support  []  Symptom Management  []  Advanced Care Planning    Chief Complaint: Lili Plaza is a 76 y.o. male with chief complaint of fever    Assessment/Plan      Active Hospital Problems    Diagnosis Date Noted    UTI (urinary tract infection) [N39.0] 03/20/2020   prostate ca with mets  Cancer related pain  Pleural effusion s/p thoracentesis  Having loose watery stools-consider order stool c-dif      Palliative Care Encounter/Recommendations:      - Goals of care: continue current management and to be determined     - Code Status: DNR-CCA- attending will need to complete DNR form for discharge   - Symptom Management:   Fentanyl patch 25 mcg q 72   - add norco 5/325 q6 prn-dc/d due to side effect   - would benefit from outpatient pall med       OARRS checked       Subjective:     HPI:  Lili Plaza is a 76 y.o. male with significant past medical history of Arthritis, Asthma, GIB , Hypertension, Paroxysmal atrial flutter, Primary prostate cancer with metastasis from prostate to other site on Keytruda and Xgeva and Sleep apnea who presented with fever from SNF. He has also had fatigue, productive cough, sob. He was being treated for UTI outpatient and UA remains positive in ED. Imaging in ED showed infiltrates, pleural effusion, liver lesion, necrotic mass left kidney, bone mets, rectal mass as well as a possible lung mass. Procalcitonin elevated. He had a thoracentesis. Per surgery note s/o reported rectal mass has been worked up before and was not cancerous. Subjective/Events/Discussions:  Alert. He lives with s/o and needs some help with ADLs at baseline. He reports that some of the findings on ct scan are not new.  He has not been on cancer treatment over last few weeks due to hospital stay and rehab. He reports achy pain to right shoulder and right rib. It is a 7/10 at baseline. He does not feel prn tylenol is doing much. Agreeable to add norco q 6 prn along with fentanyl patch. He has tried norco before. It works but makes him sleepy. He has had loose water stools over last few days. RN reports he had to watery bm yesterday. He will likley go back to finish rehab and wants to restart cancer tx when able. He has advanced directives. We discussed code status. Recommended CCA in patient with advanced cancer, he will discuss further with family. Spoke with daughter, Ganesh Luo on the phone. She thought per his living will he was already a DNR. I explained that living will pertains to certain circumstances. She has spoken to her farther and he would not want chest compressions. She is unsure about short term intubation but he would not want long term vent support. We reviewed code status options and will change to CCA. I will discuss intubation with patient tomorrow. She reports norco made him so sleepy he could not stay awake to participate in therapy. Will d/c. Dr Kristal Frank started fentanyl patch and she would like me to confer with her before adding any other medication. She is also asking if urology can be consulted for Lurpon infection. Will defer to attending. Past Medical History:   Diagnosis Date    Arthritis     Asthma     GIB (gastrointestinal bleeding)     History of blood transfusion     Hypertension     Paroxysmal atrial flutter ([facility] [address] 4/21/2019    Primary prostate cancer with metastasis from prostate to other site [facility]) 03/2018    already had metastasized.      Sleep apnea        Past Surgical History:   Procedure Laterality Date    CHOLECYSTECTOMY      COLON SURGERY      scar tissue removed     COLONOSCOPY N/A 4/24/2019    COLONOSCOPY DIAGNOSTIC performed by Rosa Elena Matthews MD at [address] COLONOSCOPY N/A 11/1/2019 COLONOSCOPY CONTROL HEMORRHAGE performed by Gus Alonzo DO at [address]  2/3/2020    COLONOSCOPY CONTROL HEMORRHAGE performed by Gus Alonzo DO at [address] [facility] LIVER BIOPSY      PROCTOSIGMOIDOSCOPY N/A 12/9/2019    FLEXIBLE SIGMOIDOSCOPY, CONTROL HEMORRHAGE performed by Gus Alonzo DO at [address] N/A 2/3/2020    [address] (CPT 91913 [id number]) performed by Gus Alonzo DO at [address]  12/09/2019    SINUS SURGERY         No family history on file. Reviewed, noncontributory  Social History     Tobacco Use    Smoking status: Never Smoker    Smokeless tobacco: Never Used   Substance Use Topics    Alcohol use: No     Comment:      Drug use: No        Allergies   Allergen Reactions    Adhesive Tape Other (See Comments)     \"Tears Skin Off\"    Iodine      Patient states he has no allergy to this Shellfish allergy     Irbesartan     Lisinopril Other (See Comments)     \"Cough\"    Shellfish-Derived Products Diarrhea and Nausea And Vomiting       ROS: UNLESS STATED ABOVE PATIENT DENIES:  CONSTITUTIONAL:  fever, chill, rigors, nausea, vomiting, fatigue. HEENT: blurry vision, double vision, hearing problem, tinnitus, hoarseness, dysphagia, odynophagia  RESPIRATORY: cough, shortness of breath, sputum expectoration. CARDIOVASCULAR:  Chest pain/pressure, palpitation, syncope, irregular beats  GASTROINTESTINAL:  abdominal or rectal pain, diarrhea, constipation, .   GENITOURINARY:  Burning, frequency, urgency, incontinence,   INTEGUMENTARY: rash, wound, pruritis  HEMATOLOGIC/LYMPHATIC:  Swelling, sores, easy bruising,   MUSCULOSKELETAL:  pain, edema, joint swelling or redness  NEUROLOGICAL:  light headed, dizziness, weakness, tremors    Objective:     Physical Exam  BP (!) 153/88   Pulse 101   Temp 97.4 °F (36.3 °C) (Temporal)   Resp 18   Ht 5' 10\" (1.778 m)   Wt 172 lb (78 kg) SpO2 94%   BMI 24.68 kg/m²     Gen:  Alert, appears stated age, well nourished, in no acute distress  HEENT:  Normocephalic, conjunctiva pink, no drainage, mucosa moist  Neck:  Supple  Lungs:  CTA bilaterally, no audible rhonchi or wheezes noted  Heart[de-identified]  RRR, + murmur, rub, or gallop noted during exam  Abd:  Soft, non tender, non distended, BS+  Ext:  Moving all extremities, no edema, pulses present  Skin:  Warm and dry  Neuro:  PERRL, Alert, oriented x 3; following commands        Inpatient/Home medications were reviewed      Objective data reviewed: labs, images, records, medication use, vitals and chart      - Advanced Directives: yes   -Surrogate/Legal NOK: Child and HCPOA    Contacts:  Michelle Sanford    - Spiritual assessment: No spiritual distress identified     - Discharge planning: discharge to ECF    - Prognosis: Guarded    - Referrals to: none today    Time/Communication  Greater than 51% of time spent, total 70 minutes in counseling and coordination of care at the bedside regarding goals of care, symptom management, diagnosis and prognosis and see above. [facility]  Palliative Medicine    Discussed patient and the plan of care with the other IDT members of Palliative Med team and with patient, family and floor nurse. Thank you for allowing Palliative Medicine to participate in the care of Windy Vargas. Note: This report was completed using [employer] voiced recognition software. Every effort has been made to ensure accuracy; however, inadvertent computerized transcription errors may be present.

## 2020-03-23 NOTE — PROGRESS NOTES
ID Progress Note                [address] [facility], [place], [address]            Phone [phone]     Fax [phone]      Chief complaint   Fever/fatigue    Subjective:  More alert and awake  The patient is awake and alert. Tolerating medications. Reports no side effects. Afebrile. 10 ROS otherwise negative unless otherwise specified above. Objective:    Vitals:    03/23/20 1106   BP:    Pulse:    Resp:    Temp:    SpO2: 96%        General appearance: Alert, Awake, Oriented times 3, no distress  Skin: Warm and dry  Eyes: Pink palpebral conjunctivae. PERRL  Ears: External ears normal.  Nose/Sinuses: Nares normal. Septum midline. Mucosa normal. No sinus tenderness. Oropharynx: Oropharynx clear with no exudates seen  Neck: Neck supple. No jugular venous distension, lymphadenopathy or thyromegaly Trachea midline  Lungs: Lungs clear to auscultation bilaterally. No rhonchi, crackles or wheezes  Heart: S1 S2  , grade 2 x 6 systolic murmur loudest at the apex   abdomen: Abdomen soft, non-tender. BS normal. No masses, organomegaly  Extremities: No edema, Peripheral pulses palpable  Musculoskeletal: Muscular strength appears intact.  No joint effusion, tenderness, swelling or warmth    Labs:  Recent Labs     03/21/20  0644   WBC 9.6   RBC 3.28*   HGB 9.2*   HCT 29.6*   MCV 90.2   MCH 28.0   MCHC 31.1*   RDW 17.9*      MPV 8.5     CMP:    Lab Results   Component Value Date     03/21/2020    K 3.2 03/21/2020    CL 95 03/21/2020    CO2 21 03/21/2020    BUN 28 03/21/2020    CREATININE 1.2 03/21/2020    GFRAA >60 03/21/2020    LABGLOM 59 03/21/2020    GLUCOSE 198 03/21/2020    PROT 5.9 03/21/2020    LABALBU 2.8 03/21/2020    CALCIUM 8.4 03/21/2020    BILITOT 0.6 03/21/2020    ALKPHOS 67 03/21/2020    AST 13 03/21/2020    ALT 8 03/21/2020          Microbiology :  Recent Labs     03/20/20  2149   BC 24 Hours- no growth     Recent Labs     03/21/20  1422   BLOODCULT2 24 Hours- no growth     Recent Labs     03/20/20 2135   LABURIN >100,000 CFU/ml     No results for input(s): CULTRESP in the last 72 hours. No results for input(s): WNDABS in the last 72 hours. Radiology :  XR CHEST PORTABLE   Final Result      Moderate right-sided pleural effusion small left-sided pleural   effusion      No evidence of airspace consolidation or pulmonary venous congestion         CT HEAD WO CONTRAST   Final Result      NO ACUTE INTRACRANIAL PROCESS         US THORACENTESIS   Final Result   Ultrasound guidance for right thoracentesis. CT ABDOMEN PELVIS W IV CONTRAST Additional Contrast? None   Final Result   Diffuse metastatic malignancy in the abdomen and pelvis with the   multiple hypodense infiltrative hepatic lesions with the rim of soft   tissue mass surrounding the liver concerning for diffuse hepatic   malignancy. Large necrotic mass in the left kidney concerning for renal cell   carcinoma with the moderately enlarged retroperitoneal and inguinal   lymphadenopathy. A large lobulated rectal mass concerning for malignancy. There is also   enlarged prostate gland concerning for malignancy with possible   malignancy involving the urinary bladder which is  thickened with   hydronephrosis. Infiltrates and pleural effusion right lung base with a masslike   density in the right lung base which may represent focal consolidation   versus malignancy. Consider PET CT scan. Extensive skeletal metastasis. ALERT:  THIS IS AN ABNORMAL REPORT      XR CHEST PORTABLE   Final Result   Patchy and masslike infiltrates and pleural effusions in the right   lung base concerning for complicated pneumonia. Underlying malignancy   is not excluded. Surveillance preferably by CT scan is recommended.             URINARY CATHETER OUTPUT ([place]):  Urethral Catheter Straight-tip 16 fr-Output (mL): 350 mL          Assessment and Plan:      · UTI, catheter related, pseudomonas aeruginosa (zosyn sensitive)  · Concern for metastatic malignancy-involving lungs and liver  · Right-sided pleural effusion status post thoracentesis done on 3/21/2020  · Questionable pneumonia     Plan  -Keep on vancomycin and Zosyn, van dosing by pharmacy  -Follow blood, urine cultures as well as results of pleural fluid analysis, not concerning for infection  -Pulmonary follow-up  - oncology follow up           Electronically signed by Colette Cantu MD on 3/23/2020 at 2:32 PM

## 2020-03-23 NOTE — CONSULTS
3/23/2020 8:30 AM  Service: Urology  Group: [facility] urology (Alphonso/Davi)    Marielle Puga  [id number]     Chief Complaint:    Prostate Cancer     History of Present Illness: The patient is a 76 y.o. male patient who presented to the hospital with complaints of fever and fatigue for one week and is being evaluated for pneumonia. He is a patient of Dr. Danitza Gaitan and has a history stage IV prostate cancer with mets to the bone. He receives Lupron injections every three months through their office. He has also had chronic indwelling yanez catheter and states this was last changed 2 weeks ago. He follows with medical oncology,  Dr. Cyrus Hartmann and has been on [place] since April 2019 and radiation to lumbar sacral spine in April 2019 as well. Has been diagnosed with recurrent disease of the liver in February 2020 and has recently started [facility] in February 2020 (has had 2 cycles so far). He recently underwent thoracentesis two days ago and cytology from this is pending. Past Medical History:   Diagnosis Date    Arthritis     Asthma     GIB (gastrointestinal bleeding)     History of blood transfusion     Hypertension     Paroxysmal atrial flutter ([address] 4/21/2019    Primary prostate cancer with metastasis from prostate to other site [facility]) 03/2018    already had metastasized.      Sleep apnea          Past Surgical History:   Procedure Laterality Date    CHOLECYSTECTOMY      COLON SURGERY      scar tissue removed     COLONOSCOPY N/A 4/24/2019    COLONOSCOPY DIAGNOSTIC performed by Redd Schaffer MD at [address] COLONOSCOPY N/A 11/1/2019    COLONOSCOPY CONTROL HEMORRHAGE performed by Saundra oWrley DO at [address]  2/3/2020    COLONOSCOPY CONTROL HEMORRHAGE performed by aSundra Worley DO at [address] N/A 12/9/2019    FLEXIBLE SIGMOIDOSCOPY, CONTROL HEMORRHAGE performed by Saundra Worley DO at [address] [facility] PROCTOSIGMOIDOSCOPY N/A 2/3/2020    SIGMOIDOSCOPY FLEXIBLE (CPT 88676 [id number]) performed by Felix Heller DO at [address]  12/09/2019    SINUS SURGERY         Medications Prior to Admission:    Medications Prior to Admission: mirtazapine (REMERON) 15 MG tablet, Take 15 mg by mouth nightly  amLODIPine (NORVASC) 5 MG tablet, Take 5 mg by mouth daily  Omega-3 Fatty Acids (FISH OIL) 1000 MG CAPS, Take 3,000 mg by mouth daily  calcium carbonate 600 MG TABS tablet, Take 1 tablet by mouth 2 times daily  doxycycline hyclate (VIBRA-TABS) 100 MG tablet, Take 100 mg by mouth 2 times daily  fentaNYL (DURAGESIC) 25 MCG/HR, Place 1 patch onto the skin every 72 hours. acetaminophen (TYLENOL) 325 MG tablet, Take 650 mg by mouth every 4 hours as needed for Pain  vitamin C (ASCORBIC ACID) 500 MG tablet, Take 500 mg by mouth daily   Cholecalciferol (VITAMIN D3) 75 MCG (3000 UT) TABS, Take 3,000 Units by mouth 2 times daily   metoprolol (LOPRESSOR) 100 MG tablet, Take 100 mg by mouth 2 times daily   amiodarone (CORDARONE) 200 MG tablet, Take 200 mg by mouth daily  ondansetron (ZOFRAN) 4 MG tablet, Take 4 mg by mouth daily as needed for Nausea or Vomiting   oxybutynin (DITROPAN-XL) 10 MG extended release tablet, Take 10 mg by mouth daily as needed (Bladder Spasms)  tamsulosin (FLOMAX) 0.4 MG capsule, Take 0.8 mg by mouth nightly   enzalutamide (XTANDI) 40 MG capsule, Take 160 mg by mouth Daily with supper   montelukast (SINGULAIR) 10 MG tablet, Take 10 mg by mouth nightly    Allergies:    Adhesive tape; Iodine; Irbesartan; Lisinopril; and Shellfish-derived products    Social History:    reports that he has never smoked. He has never used smokeless tobacco. He reports that he does not drink alcohol or use drugs. Family History:   Non-contributory to this Urological problem  family history is not on file.     Review of Systems:  Constitutional: No fever or chills, weak   Respiratory: negative for cough and hemoptysis  Cardiovascular: negative for chest pain and dyspnea  Gastrointestinal: negative for abdominal pain, diarrhea, nausea and vomiting   Derm: negative for rash and skin lesion(s)  Neurological: negative for seizures and tremors  Musculoskeletal: back pain    Psychiatric: Negative   : As above in the HPI, otherwise negative  All other reviews are negative    Physical Exam:     Vitals:  BP (!) 166/93   Pulse 99   Temp 97.8 °F (36.6 °C) (Oral)   Resp 18   Ht 5' 10\" (1.778 m)   Wt 172 lb (78 kg)   SpO2 97%   BMI 24.68 kg/m²     General:  Awake, alert, oriented X 3. No apparent distress. HEENT:  Normocephalic, atraumatic. Lungs:  Respirations symmetric and non-labored. Abdomen:  soft, nontender, no masses  Extremities:  No clubbing, cyanosis, or edema  Skin:  Warm and dry, no open lesions or rashes  Neuro: There are no motor or sensory deficits in the 4 quadrant extremities   Rectal: deferred  Genitourinary: Milner catheter intact draining cloudy yellow urine     Labs:     Recent Labs     03/20/20  1154 03/21/20  0644   WBC 13.0* 9.6   RBC 3.83 3.28*   HGB 10.9* 9.2*   HCT 34.3* 29.6*   MCV 89.6 90.2   MCH 28.5 28.0   MCHC 31.8* 31.1*   RDW 17.7* 17.9*    249   MPV 8.4 8.5         Recent Labs     03/20/20  1154 03/21/20  0644   CREATININE 1.3* 1.2       Component Collected Lab   Organism Abnormal  03/20/2020  9:35 PM [address] [place] [address] Lab   Pseudomonas aeruginosa    Urine Culture, Routine 03/20/2020  9:35 PM [address] [place] [address] Lab   >100,000 CFU/ml    Testing Performed By     Lab - [address]. Name Director Address Valid Date Range   [address] [place] [address] [address] [place] [address] LAB Aden Elizalde MD [address] [place] [address].   Chayo Gannon [id number] 12/03/19 1502-Present   Narrative   Performed by: [address] [place] [address] Lab   Source: URINE       Site: Urine&Urine             Susceptibility     Pseudomonas aeruginosa (1) Antibiotic Interpretation BARBARA Status    gentamicin Intermediate ^8 mcg/mL     levofloxacin Sensitive ^2 mcg/mL     piperacillin-tazobactam Sensitive <=^4 mcg/mL     tobramycin Sensitive <=^1 mcg/mL     cefepime Sensitive ^6 mcg/mL               Imaging:   CT ABDOMEN PELVIS W IV CONTRAST 3/20/20  Impression   Diffuse metastatic malignancy in the abdomen and pelvis with the   multiple hypodense infiltrative hepatic lesions with the rim of soft   tissue mass surrounding the liver concerning for diffuse hepatic   malignancy.       Large necrotic mass in the left kidney concerning for renal cell   carcinoma with the moderately enlarged retroperitoneal and inguinal   lymphadenopathy.       A large lobulated rectal mass concerning for malignancy. There is also   enlarged prostate gland concerning for malignancy with possible   malignancy involving the urinary bladder which is  thickened with   hydronephrosis.       Infiltrates and pleural effusion right lung base with a masslike   density in the right lung base which may represent focal consolidation   versus malignancy. Consider PET CT scan.       Extensive skeletal metastasis.       ALERT:  THIS IS AN ABNORMAL REPORT               Assessment/plan:  Metastatic Prostate Cancer  Left Renal Mass   Chronic Yanez  UTI       Antibiotics per ID, currently on Zosyn   Continue yanez catheter  This will need to be left indwelling and cont to be changed every 4 weeks  This is due to be changed in approx 2 weeks  Oncology following and managing metastatic disease, currently on Xtandi   He receives Lupron injections through Dr. Marcelino Jordan office every three months and was due to have this this week. This was ordered and is not available in pharmacy at this time. He will cont his Lupron injections per Dr. Marcelino Jordan office upon discharge. He and his daughter state they have been aware of the left renal mass and this has been monitored with Dr. Edyth Essex as well.    Continue management of metastatic disease per oncology  Continue Lupron injections per Dr. Alex Lerma office upon discharge  No further  interventions are planned at this time  Please call with any further questions or concerns  Thank you for allowing us to participate in his care        Electronically signed by HAMLET Moody CNP on 3/23/2020 at 8:30 AM     The patient was seen and examined by me today. I agree with the assessment and plan of BEATRIZ Moody CNP. Hold on Lupron in hospital and have this with Dr. David Emery. In light of overall health issues no concern re: renal mass at this time. Continue antibiotics for 2 weeks total based on sensitivities. Please call if questions or concerns.

## 2020-03-23 NOTE — PROGRESS NOTES
OCCUPATIONAL THERAPY INITIAL EVALUATION      Date:3/23/2020  Patient Name: Michaela Moore  MRN: [id number]  : 1945  Room: -A      [address], OTR/L #[id number]    AM-PAC Daily Activity Raw Score:   Recommended Adaptive Equipment: TBD     Diagnosis: UTI (urinary tract infection) [N39.0]     Referring physician: Cathie Connors MD  Procedures: thoracentesis on 3/21/20  Pertinent Medical History: arthritis, asthma, GIB, HTN, A-flutter, prostate CA w/ mets    Precautions:  Falls, bed alarm, incontinent     Home Living: Pt admitted from [facility] owned: w/w    Prior Level of Function: assistance with ADLs; ambulated short distances w/ w/w and staff assistance  Per pt, ate most meals in room - not in dining room  Driving: no    Pain Level: Pt c/o 6-1/10 low back pain this session ; reinforced pain management strategies    Cognition: A&O: 3/4; Follows 1 step directions   Memory:  fair    Sequencing:  fair    Problem solving:  fair -   Judgement/safety:  fair -     Functional Assessment:   Initial Eval Status  Date: 3/23/20 Treatment Status  Date: Short Term Goals/LTG  Treatment frequency: 1-4x/wk   Feeding Supervision   Modified [place]    Grooming Minimal Assist   seated  Modified [place]    UB Dressing Moderate Assist   Stand by Assist    LB Dressing Dependent   Moderate Assist    Bathing Maximal Assist  Minimal Assist    Toileting Dependent   Including hygiene  Incontinent of stool, yanez  Moderate Assist    Bed Mobility  Rolling: Moderate Assist   Supine to sit: Maximal Assist   Sit to supine: Maximal Assist   Rolling: Minimal Assist   Supine to sit: Minimal Assist   Sit to supine: Minimal Assist    Functional Transfers Max A   Sit><stand multiple times from EOB  Min A   Functional Mobility NT  Unable to complete d/t decreased activity tolerance and bowel incontinence  Mod A   Balance Sitting: Mod><SBA  (posterior lateral lean noted)  Standing:  Max A w/ w/w     Activity Tolerance Fair-  Fair+   Visual/  Perceptual Glasses: yes                Hand dominance: R   Strength ROM Additional Info:    RUE  3+/5  WFL   good  and wfl FMC/dexterity noted during ADL tasks       LUE 3+/5  WFL   good  and wfl FMC/dexterity noted during ADL tasks       Hearing: WFL  Sensation: c/o numbness/tingling B toes  Tone: WFL                   Treatment: Upon arrival, patient lying in bed. Pt agreeable to OT session this date in collaboration w/ PT. Therapist facilitated bed mobility (cues for body mechanics and sequencing), unsupported sitting balance training (Pt c/o mild dizziness upon sitting EOB - safety reinforced, decreased w/ rest break. Pt tolerated ~13-15 minutes EOB. Intermittent posterior lateral lean noted w/ education/cues for hand placement and posture - balance improved as progressed and w/ cues), functional transfers (sit><stand multiple times from EOB for bedpan placement/toileting w/ education/cues for safety/hand placement and sequencing) and standing tolerance tasks (addressing posture, balance and activity tolerance. Pt able to tolerate ~1 minute to 30 second increments of standing - time decreased as progressed). Unable to complete lateral sidesteps/ambulation this date d/t decreased activity tolerance and incontinence. Therapist facilitated self-care retraining: UB/LB self-care tasks (gown, socks), toileting task (incontinent of stool w/ standing task (assist w/ hygiene). Attempted to utilize bedpan at EOB (following incontinence) however pt unable to utilize d/t discomfort. Pt assisted to supine for additional toilet hygiene) and seated grooming task while educating pt on modified techniques, posture, safety and energy conservation techniques. Skilled monitoring of HR, O2 sats and pts response to treatment.      At end of session, patient lying in bed (repositioned for comfort w/ HOB elevated for breakfast tray) with call light and phone within reach, all lines and tubes intact. Comments: Pt demonstrated decreased independence w/ self-care tasks and functional mobility secondary to limited activity tolerance, generalized weakness and safety awareness deficits. Pt would benefit from continued skilled OT to increase functional independence and quality of life. mod  Profile and History- med (extensive chart review)  Assessment of Occupational Performance and Identification of Deficits- med  Clinical Decision Making- med    Evaluation time includes thorough review of current medical information, gathering information on past medical history/social history and prior level of function, completion of standardized testing/informal observation of tasks, assessment of data, and development of POC/Goals. Assessment of current deficits   Functional mobility [x]  ADLs [x] Strength [x]  Cognition [x]  Functional transfers  [x] IADLs [] Safety Awareness [x]  Endurance [x]  Fine Motor Coordination [] Balance [x] Vision/perception [] Sensation []   Gross Motor Coordination [] ROM [] Delirium []                  Motor Control []    Plan of Care:  5-7 days  ADL retraining [x]   Equipment needs [x]   Neuromuscular re-education [x] Energy Conservation Techniques [x]  Functional Transfer training [x] Patient and/or Family Education [x]  Functional Mobility training [x]  Environmental Modifications [x]  Cognitive re-training [x]   Compensatory techniques for ADLs [x]  Splinting Needs []   Positioning to improve overall function [x]   Therapeutic Activity [x]  Therapeutic Exercise  [x]  Visual/Perceptual: []    Delirium prevention/treatment  []   Other:  []    Rehab Potential: Good for established goals    Patient / Family Goal: Not stated     Patient and/or family were instructed on diagnosis, prognosis/goals and plan of care. Demonstrated fair- understanding. [] Malnutrition indicators have been identified and nursing has been notified to ensure a dietitian consult is ordered.        Mod Evaluation completed +    Treatment Time In:08:29            Treatment Time QUN:11:32             Treatment Charges: Mins Units   Ther Ex  28273     Manual Therapy 50778     Thera Activities 52413 11 1   ADL/Home Mgt 21313 13 1   Neuro Re-ed 20394     Group Therapy      Orthotic manage/training  97736     Non-Billable Time     Total Timed Treatment 24 [address] [place], OTR/L #[id number]

## 2020-03-23 NOTE — PLAN OF CARE
Problem: Falls - Risk of:  Goal: Will remain free from falls  Description: Will remain free from falls  Outcome: Met This Shift  Goal: Absence of physical injury  Description: Absence of physical injury  Outcome: Met This Shift     Problem: Pain:  Goal: Pain level will decrease  Description: Pain level will decrease  Outcome: Met This Shift  Goal: Control of acute pain  Description: Control of acute pain  Outcome: Met This Shift  Goal: Control of chronic pain  Description: Control of chronic pain  Outcome: Met This Shift     Problem: Urinary Elimination:  Goal: Signs and symptoms of infection will decrease  Description: Signs and symptoms of infection will decrease  Outcome: Met This Shift

## 2020-03-23 NOTE — CARE COORDINATION
The patient is from [facility] skilled and will need PT OT  evals done for a re precert for the patient to return there once medically stable. I called the daughter Felix Rivers [phone] She voiced concern on how hard this is not being able to come see your loved one in the hospital . The patient has a rt sided pleural  Effusion/pneumonia . ++ ca of the lungs and liver also a large necrotic mass in left kidney. The patient is a dnr cca  The discharge plan is  return to [facility] once medically stable.

## 2020-03-23 NOTE — PROGRESS NOTES
Palliative Care Department  Palliative Care Initial Consult  Provider: [address] [place] [address] Day: 4    Referring Provider Dyllan Kumar MD    Reason for Consult:  []  Code status Discussion  [x]  Assist with goals of care  [x]  Psychosocial support  [x]  Symptom Management  []  Advanced Care Planning    Chief Complaint: Maddy Kelsey is a 76 y.o. male with chief complaint of fever    Assessment/Plan      Active Hospital Problems    Diagnosis Date Noted    Cancer related pain [G89.3]     UTI (urinary tract infection) [N39.0] 03/20/2020   prostate ca with mets  Cancer related pain  Pleural effusion s/p thoracentesis  Having loose watery stools-consider order stool c-dif  Increase size in liver lesion    Palliative Care Encounter/Recommendations:      - Goals of care: continue current management and to be determined     - Code Status: DNR-CCA- attending will need to complete DNR form for discharge   - Symptom Management:   Fentanyl patch 25 mcg q 72   - Tylenol 650 mg q 4 prn   - consider increasing fentanyl to 37 mcg vs adding decadron 4mg daily- will discuss with oncology per family request-left message for oncology NP [facility]   - would benefit from outpatient pall med       OARRS checked       Spoke with oncology and ok to increase fentanyl patch. Will place order tomorrow when patch change due. He will need a 25 mcg and 12 mcg patch     Subjective:     HPI:  Maddy Kelsey is a 76 y.o. male with significant past medical history of Arthritis, Asthma, GIB , Hypertension, Paroxysmal atrial flutter, Primary prostate cancer with metastasis from prostate to other site on Keytruda and Xgeva and Sleep apnea who presented with fever from SNF. He has also had fatigue, productive cough, sob. He was being treated for UTI outpatient and UA remains positive in ED.  Imaging in ED showed infiltrates, pleural effusion, liver lesion, necrotic mass left kidney, bone mets, rectal mass as well as a possible lung mass. Procalcitonin elevated. He had a thoracentesis. Per surgery note s/o reported rectal mass has been worked up before and was not cancerous. 3/22/20 Subjective/Events/Discussions:  Alert. He lives with s/o and needs some help with ADLs at baseline. He reports that some of the findings on ct scan are not new. He has not been on cancer treatment over last few weeks due to hospital stay and rehab. He reports achy pain to right shoulder and right rib. It is a 7/10 at baseline. He does not feel prn tylenol is doing much. Agreeable to add norco q 6 prn along with fentanyl patch. He has tried norco before. It works but makes him sleepy. He has had loose water stools over last few days. RN reports he had to watery bm yesterday. He will likley go back to finish rehab and wants to restart cancer tx when able. He has advanced directives. We discussed code status. Recommended CCA in patient with advanced cancer, he will discuss further with family. Spoke with daughter, Patrice Clements on the phone. She thought per his living will he was already a DNR. I explained that living will pertains to certain circumstances. She has spoken to her farther and he would not want chest compressions. She is unsure about short term intubation but he would not want long term vent support. We reviewed code status options and will change to CCA. I will discuss intubation with patient tomorrow. She reports norco made him so sleepy he could not stay awake to participate in therapy. Will d/c. Dr Collin Dubin started fentanyl patch and she would like me to confer with her before adding any other medication. She is also asking if urology can be consulted for Lurpon infection. Will defer to attending. 3/23/2020  Achy pain to right shoulder and right rib currently 5/10. He feels pain is somewhat manageable but is also asking about prn IV morphine. Pain can also be sharp and shooting at times.  He reports neurontin and lyrica have not worked in the past.  I discussed this with is daughter with his permission. Option for pain control include increasing fentanyl patch or adding low dose steroid. Will check with oncology per daughter request.  We discussed code staus again and he is okay with short term intubation. ROS: UNLESS STATED ABOVE PATIENT DENIES:  CONSTITUTIONAL:  fever, chill, rigors, nausea, vomiting, fatigue. RESPIRATORY: cough, shortness of breath, sputum expectoration. CARDIOVASCULAR:  Chest pain/pressure, palpitation, syncope, irregular beats  GASTROINTESTINAL:  abdominal or rectal pain, diarrhea, constipation, .   GENITOURINARY:  Burning, frequency, urgency, incontinence,   INTEGUMENTARY: rash, wound, pruritis  MUSCULOSKELETAL:  pain, edema, joint swelling or redness  NEUROLOGICAL:  light headed, dizziness, weakness, tremors    Objective:     Physical Exam  BP (!) 166/93   Pulse 99   Temp 97.8 °F (36.6 °C) (Oral)   Resp 18   Ht 5' 10\" (1.778 m)   Wt 172 lb (78 kg)   SpO2 97%   BMI 24.68 kg/m²     Gen:  Alert, appears stated age, well nourished, in no acute distress  HEENT:  Normocephalic, conjunctiva pink, no drainage, mucosa moist  Neck:  Supple  Lungs:  CTA bilaterally, no audible rhonchi or wheezes noted  Heart[de-identified]  RRR, + murmur, rub, or gallop noted during exam  Abd:  Soft, non tender, non distended, BS+  Ext:  Moving all extremities, no edema, pulses present  Skin:  Warm and dry  Neuro:  PERRL, Alert, oriented x 3; following commands    Inpatient/Home medications were reviewed      Objective data reviewed: labs, images, records, medication use, vitals and chart      - Advanced Directives: yes   -Surrogate/Legal NOK: Child and HCPOA    Contacts:  Michelle Sanford    - Spiritual assessment: No spiritual distress identified     - Discharge planning: discharge to ECF    - Prognosis: Guarded    - Referrals to: none today    Time/Communication  Greater than 51% of time spent, total 25 minutes in counseling and coordination of care at the bedside regarding goals of care, symptom management, diagnosis and prognosis and see above. Ang VIEYRA  Palliative Medicine    Discussed patient and the plan of care with the other IDT members of Palliative Med team and with patient, family and floor nurse. Thank you for allowing Palliative Medicine to participate in the care of Alisha Soto. Note: This report was completed using computerize voiced recognition software. Every effort has been made to ensure accuracy; however, inadvertent computerized transcription errors may be present.

## 2020-03-23 NOTE — PROGRESS NOTES
Physical Therapy  Physical Therapy Initial Assessment     Name: Bib Tsai  : 1945  MRN: [id number]    Referring Provider: Hannah Waller MD    Date of Service: 3/23/2020    Evaluating PT: Sandra Espana, PT, DPT, [id number]    Room #: 8736/1860-Y  Diagnosis: UTI  PMHx/PSHx: OA, HTN, prostate CA, asthma  Procedures/Surgeries: Thoracentesis (3/21)  Precautions: Fall risk, incontinent of feces, yanez, alarm    SUBJECTIVE:    Pt is questionable historian. Per pt, pt is from [place] [address]. Pt lives with significant other in a single story house with 4 stair(s) and 1 rail(s) to enter. Bed is on first floor and bath is on first floor. Pt ambulated with [employer] for short distances with staff assistance at [place] [address] prior to admission. Pt ambulated without AD prior to SWETHA. OBJECTIVE:   Initial Evaluation  Date: 3/23/20 Treatment Date: Short Term/ Long Term   Goals   AM-PAC 6 Clicks 65/76     Was pt agreeable to Eval/treatment? Yes     Does pt have pain? 6-8/10 lower back \"ache\"     Bed Mobility  Rolling: Min A  Supine to sit: Max A  Sit to supine: Max A  Scooting: Max A toward EOB  Rolling: SBA  Supine to sit: SBA  Sit to supine: SBA  Scooting: SBA   Transfers Sit to stand: Max A  Stand to sit: Max A  Stand pivot: NT  Sit to stand: Min A  Stand to sit: Min A  Stand pivot: Min A with [employer]   Ambulation   NT  >25 feet with [employer] with Mod A   Stair negotiation: ascended and descended NT  4 step(s) with 1 rail(s) with Mod A   ROM BUE: Refer to OT note  BLE: WFL     Strength BUE: Refer to OT note  BLE: NT     Balance Sitting EOB: SBA to Mod A  Dynamic Standing: NT  Sitting EOB: Supervision  Dynamic Standing: Min A with [employer]     Pt is A & O x: 4 to person, place, month/year, and situation. Sensation: Pt reports chronic toes numbness. Edema: NT    Patient education  Pt educated on hand placement during sit to stand transfer.     Patient response to education:   Pt verbalized understanding Pt demonstrated skill Pt requires further education in this area   Yes With cueing Yes     ASSESSMENT:    Comments:   Pt was supine in bed upon room entry, agreeable to PT evaluation with OT collaboration. Pt is fully oriented but appears confused at times. Pt often fixates on having bowel movement so redirection was required frequently. Pt was able to partially advance B LE to EOB during supine to sit transfer; pt required assistance for B LE and trunk mobility. Pt had mild to moderate posterior lean with incorrect hand placement while sitting EOB. Pt sat at EOB for approximately 15 minutes throughout session. Pt performed x2 sit to stand transfers to [employer]. Pt stood for approximately 1 minute on first attempt and only 30 seconds on second attempt (fatigue). Pt perseverated on having to have bowel movement while standing on first attempt so pt was seated on bedpan. Pt did not have bowel movement until bedpan was removed so pt was assisted with perineal care once back in supine position. Pt was assisted with rolls to L and R as perineal care was performed. Pt was left supine in bed with all needs met at conclusion of session. Treatment:  Patient practiced and was instructed in the following treatment:     Therapeutic activities: Pt completed all therapeutic activities noted above. Pt was cued for hand placement during static/dynamic sitting at EOB. Pt was cued to increase forward lean to correct posterior lean. Pt sat at EOB for approximately 15 minutes as pt focused on static/dynamic sitting balance. Pt performed several sit to stand transfers from EOB and stood for approximately 1 minute on first attempt and 30 seconds on second attempt as pt focused on static/dynamic standing balance and postural re-education. Pt's/family goals:   1. To return home. Patient and or family understand(s) diagnosis, prognosis, and plan of care. Yes. PLAN:    PT care will be provided in accordance with the objectives noted above.  Exercises and functional mobility practice will

## 2020-03-23 NOTE — PROGRESS NOTES
Subjective: The patient is awake and alert in bed eating breakfast, reports decent appetite but has early satiety. He is having 6/10 lower back pain, along with numbness and tingling in his BLE. He did have radiation to that area in the past although he still experiences pain from time to time. Reports chronic shortness of breath with a dry non productive cough. Patient also had nausea all last night without vomiting. He is also having diarrhea. No GI or  blood loss, confirmed by bedside RN. No problems overnight. Denies chest pain, angina or abdominal discomfort. Tolerating diet. Objective:    BP (!) 166/93   Pulse 99   Temp 97.8 °F (36.6 °C) (Oral)   Resp 18   Ht 5' 10\" (1.778 m)   Wt 172 lb (78 kg)   SpO2 97%   BMI 24.68 kg/m²     General: NAD  HEENT: No thrush or mucositis, EOMI, PERRLA  Heart:  RRR, no murmurs, gallops, or rubs.   Lungs:  Diminished bilaterally, no wheeze, rales or rhonchi  Abd: bowel sounds present, nontender, nondistended, no masses  Extrem:  +1 BLE edema no clubbing or cyanosis  Lymphatics: No palpable adenopathy in cervical and supraclavicular regions  Skin: Intact, no petechia or purpura    CBC with Differential:    Lab Results   Component Value Date    WBC 9.6 03/21/2020    RBC 3.28 03/21/2020    HGB 9.2 03/21/2020    HCT 29.6 03/21/2020     03/21/2020    MCV 90.2 03/21/2020    MCH 28.0 03/21/2020    MCHC 31.1 03/21/2020    RDW 17.9 03/21/2020    LYMPHOPCT 2.2 03/21/2020    MONOPCT 6.4 03/21/2020    MYELOPCT 1.7 04/21/2019    BASOPCT 0.1 03/21/2020    MONOSABS 0.61 03/21/2020    LYMPHSABS 0.21 03/21/2020    EOSABS 0.00 03/21/2020    BASOSABS 0.01 03/21/2020     CMP:    Lab Results   Component Value Date     03/21/2020    K 3.2 03/21/2020    CL 95 03/21/2020    CO2 21 03/21/2020    BUN 28 03/21/2020    CREATININE 1.2 03/21/2020    GFRAA >60 03/21/2020    LABGLOM 59 03/21/2020    GLUCOSE 198 03/21/2020    PROT 5.9 03/21/2020    LABALBU 2.8 03/21/2020 solution 0.5 mg, 0.5 mg, Nebulization, Q4H WA, Carmelo Waters MD, 0.5 mg at 03/22/20 2035    piperacillin-tazobactam (ZOSYN) 3.375 g in dextrose 5 % 100 mL IVPB extended infusion (mini-bag), 3.375 g, Intravenous, Q8H, Stopped at 03/23/20 0539 **AND** 0.9 % sodium chloride infusion admixture, , Intravenous, Q8H, Carmelo Waters MD, Last Rate: 12.5 mL/hr at 03/23/20 0540    [COMPLETED] vancomycin 1.5 g in dextrose 5% 300 mL IVPB, 1,500 mg, Intravenous, Once, Stopped at 03/20/20 2117 **FOLLOWED BY** vancomycin (VANCOCIN) 750 mg in dextrose 5 % 250 mL IVPB, 750 mg, Intravenous, Q12H, Carmelo Waters MD, Stopped at 03/23/20 0716    0.9 % sodium chloride infusion, , Intravenous, Continuous, Rk Balbuena DO, Last Rate: 125 mL/hr at 03/21/20 1722    Assessment:    Active Problems:    UTI (urinary tract infection)    Cancer related pain  Resolved Problems:    * No resolved hospital problems. *    75 yo gentleman known to Dr. Hossein Albarran with hx of stage IV prostate cancer involving the bone, currently stable on Nerissa [place] since April 2019, completed palliative radiation to the lumbar sacral spine April 2019. 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 Treated with Carbo and -16 from May 20, 2019 to Sept 18, 2019 and had almost CR on PET scan July 2019. Had recurrent disease in the liver Feb 2020. Started Keytruda Feb 13, 2020 and has had 2 cycles so far, last on 3/16/2020. He was recently at [facility] for urosepsis earlier in March. Also has hx of Radiation proctosigmoiditis, had a recent flexible Sigmoidoscopy in Feb 2020.  -Admitted from SNF with increased SOB, cough, fever 101.5 along with abdominal pain. CT abd/pelvis diffuse metastatic malignancy with multiple hepatic lesions. Large necrotic mass in left kidney with moderately enlarged retroperitoneal and inguinal LAD. Large lobulated rectal mass.   Also enlarged prostate gland with possible malignancy involving urinary bladder which is thickened with hydronephrosis. Infiltrates and pleural effusion right lung base with masslike density in the right lung base. Extensive skeletal metastasis. He underwent thoracentesis 3/21/2020  -Compared to prior CT from [facility] (2/4/20), he has a new pleural effusion and the dominant liver lesion appears to have increased from 5.9 x 5.2 cm to 6 x 10 cm. Plan:  Monitor CBC   F/u cytology from thoracentesis   IV ABX for UTI and PNA, I.D on board.  Patient on Vancomycin and Zosyn  Will continue to follow    Electronically signed by Petra Cooks, APRN - NP on 3/23/2020 at 8:10 AM

## 2020-03-23 NOTE — PROGRESS NOTES
Hospital Medicine Progress Note      Date of Admission: 3/20/2020  Hospital day # 3    Course: Follow-up on UTI, pneumonia, sepsis gross, metastatic cancer    Subjective    Patient more awake and cooperative  Refers no shortness of breath. No events overnight as per nurse      Exam:    BP (!) 166/93   Pulse 99   Temp 97.8 °F (36.6 °C) (Oral)   Resp 18   Ht 5' 10\" (1.778 m)   Wt 172 lb (78 kg)   SpO2 97%   BMI 24.68 kg/m²     General appearance: No apparent distress, appears stated age and cooperative. HEENT: Pupils equal, round, and reactive to light. Conjunctivae/corneas clear. Neck: Supple. No jugular venous distention. Trachea midline. Respiratory:  Normal respiratory effort. Clear to auscultation, bilaterally   Cardiovascular: S1/S2  Abdomen: Soft, non-tender, non-distended with normal bowel sounds. Musculoskeletal: No clubbing, cyanosis or edema bilaterally. Brisk capillary refill. 2+ lower extremity pulses (dorsalis pedis).    Skin:  No rashes    Neurologic: awake, alert and following commands     Medications:  Reviewed    Infusion Medications    sodium chloride 125 mL/hr at 03/21/20 1722     Scheduled Medications    fentaNYL  1 patch Transdermal Q72H    potassium chloride  20 mEq Oral BID WC    amiodarone  200 mg Oral Daily    calcium elemental  1 tablet Oral BID    enzalutamide  160 mg Oral Dinner    montelukast  10 mg Oral Nightly    vitamin C  500 mg Oral Daily    sodium chloride flush  10 mL Intravenous 2 times per day    enoxaparin  40 mg Subcutaneous Daily    ipratropium  0.5 mg Nebulization Q4H WA    piperacillin-tazobactam  3.375 g Intravenous Q8H    And    sodium chloride   Intravenous Q8H    vancomycin  750 mg Intravenous Q12H     PRN Meds: oxybutynin, sodium chloride flush, acetaminophen **OR** acetaminophen, polyethylene glycol, promethazine **OR** ondansetron    I/O    Intake/Output Summary (Last 24 hours) at 3/23/2020 9849  Last data filed at 3/23/2020 2248  Gross per 24 hour   Intake 3335 ml   Output 1050 ml   Net 2285 ml       Labs:   Recent Labs     03/20/20  1154 03/21/20  0644   WBC 13.0* 9.6   HGB 10.9* 9.2*   HCT 34.3* 29.6*    249       Recent Labs     03/20/20  1154 03/21/20  0644    134   K 4.0 3.2*   CL 95* 95*   CO2 21* 21*   BUN 31* 28*   CREATININE 1.3* 1.2   CALCIUM 9.4 8.4*       Recent Labs     03/20/20  1154 03/21/20  0644 03/21/20  1230   PROT 6.3* 5.8* 5.9*   ALKPHOS 72 67  --    ALT 9 8  --    AST 25 13  --    BILITOT 0.7 0.6  --    LIPASE 11*  --   --        No results for input(s): INR in the last 72 hours. Recent Labs     03/20/20  1154   TROPONINI <0.01       Other labs:  Lab Results   Component Value Date    TSH 3.340 10/30/2019    INR 1.0 10/30/2019       Radiology:  Imaging studies reviewed today.     ASSESSMENT:      UTI catheter related  Pneumonia, possible relation to gram-negatives  R Pleural effusion  Sepsis in relation to UTI poa and possible Gramnegative Pneumonia poa  Hepatic carcinoma by history  Renal mass  Rectal mass by CT         PLAN:    Urine culture showing Pseudomonas  S/p Thoracentesis 3/21/20  Piperacillin/tazobactam IV  Vancomycin IV pharmacy to dose  Continue to follow Blood cultures  Blood cultures negative so far  ID following  Pulmonary following  Procalcitonin>100  Palliative has been consulted  Oncology has been consulted  Records from [facility] has been requested  Palliative following  Patient currently is DNR CCA      Diet: DIET GENERAL;  Code Status: DNR-CCA    PT/OT Eval Status: [] Ordered [] Evaluation noted [x] Not applicable    DVT Prophylaxis: [x]Lovenox []Heparin []PCD [] [address] []Encouraged ambulation []N/A    Likely disposition when able:  [x]Home [] Home with [facility] [] SNF/SWETHA [] Acute Rehab [] LTAC []Other    +++++++++++++++++++++++++++++++++++++++++++++++++  Gordo Marcial MD, Hospitalist  +++++++++++++++++++++++++++++++++++++++++++++++++  NOTE: This report was transcribed using voice recognition software.  Every effort was made to ensure accuracy; however, inadvertent computerized transcription errors may be present.

## 2020-03-23 NOTE — PROGRESS NOTES
Pharmacy Consultation Note  (Antibiotic Dosing and Monitoring)    Initial consult date: 3-  Consulting physician: Kiko  Drug: Vancomycin  Indication: UTI    Age/  Gender Height Weight IBW Dosing weight  Allergy Information   74 y.o./male 5' 10\" (177.8 cm) 178 lb 9.2 oz (81 kg)     Ideal body weight: 73 kg (160 lb 15 oz)  Adjusted ideal body weight: 75 kg (165 lb 5.8 oz)    Adhesive tape; Iodine; Irbesartan; Lisinopril; and Shellfish-derived products      Temp (24hrs), Av.7 °F (37.1 °C), Min:96.7 °F (35.9 °C), Max:100.7 °F (38.2 °C)          Date  WBC BUN SCr CrCl  (mL/min) Drug/Dose Time   Given Level(s)   (Time) Comments   3/20 13 31 1.3  51 Vancomycin 1500 mg IV x 1 1842     3/21 --- 28 1.2 56 Vancomycin 750 mg IV q 12 hr 0552  <1800>     3/22 --- --- --- 56 Vancomycin 750 mg IV q 12 hr 0535      1822 Trough = 28.7 mcg/mL    Trough = 15.3 mcg/mL @ 1719 Trough drawn while vanc infusing      Hold vancomycin if trough > 20 mcg/mL   3/23 --- --- --- --- Vancomycin 750 mg IV q 12 hr 0540  <1800>           Intake/Output Summary (Last 24 hours) at 3/20/2020 1726  Last data filed at 3/20/2020 1555  Gross per 24 hour   Intake 1300 ml   Output --   Net 1300 ml   UOP: 0.6 mL/kg/hr per last 24 hours documented    Historical Cultures:  Organism   Date Value Ref Range Status   2020 GNR oxidase positive (A)  Preliminary     No results for input(s): BC in the last 72 hours. Cultures:  available culture and sensitivity results were reviewed in [facility]  3/20 Blood #1 24 hours no growth  3/20 Urine  Pseudomonas aeruginosa  3/20 Blood #1 24 hours no growth   3/20 Blood #2 24 hours no growth    Assessment:  · 76 y.o. male with a PMH of arthritis, asthma, GIB, HTN, atrial flutter, and metastatic prostate cancer presented to [facility] on 3/20 from [facility], [place], operated by [facility] complaining of fatigue and fever (101.5F). Admitted for pneumonia and UTI. Vancomycin has been initiated.   · Estimated CrCl = 56 mL/min  · Goal trough level = 15-20 mcg/mL  · sCr = 1.2 mg/dL; baseline ~ 0.8 mg/dL  · Trough yesterday morning = 28.7 mcg/mL, however, RN alerted pharmacy that trough was drawn while vancomycin was infusing. Will have trough re-drawn this afternoon. · Trough redrawn in the evening of 3/22. Trough = 15.3 mcg/mL. Plan:  · Vancomycin 750 mg Q12H. · Will check vancomycin trough level as necessary.   · Monitor renal function   · Clinical pharmacy to follow      Aristides Mon PharmD 3/23/2020 1:41 PM

## 2020-03-23 NOTE — PROGRESS NOTES
Pulmonary Progress Note  3/23/2020 12:52 PM  Subjective:   Admit Date: 3/20/2020  PCP: Eddy Winston MD  Interval History:   Awake alert   Knows he has the cancers     Diet: DIET GENERAL;  Dietary Nutrition Supplements: Frozen Oral Supplement      Data:   Scheduled Meds:    fentaNYL  1 patch Transdermal Q72H    potassium chloride  20 mEq Oral BID     amiodarone  200 mg Oral Daily    calcium elemental  1 tablet Oral BID    enzalutamide  160 mg Oral Dinner    montelukast  10 mg Oral Nightly    vitamin C  500 mg Oral Daily    sodium chloride flush  10 mL Intravenous 2 times per day    enoxaparin  40 mg Subcutaneous Daily    ipratropium  0.5 mg Nebulization Q4H WA    piperacillin-tazobactam  3.375 g Intravenous Q8H    And    sodium chloride   Intravenous Q8H    vancomycin  750 mg Intravenous Q12H       Continuous Infusions:    sodium chloride 125 mL/hr at 03/23/20 0904       PRN Meds: oxybutynin, sodium chloride flush, acetaminophen **OR** acetaminophen, polyethylene glycol, promethazine **OR** ondansetron    I/O last 3 completed shifts: In: 9471 [P.O.:720; I.V.:2615]  Out: 1050 [Urine:1050]    No intake/output data recorded.       Intake/Output Summary (Last 24 hours) at 3/23/2020 1252  Last data filed at 3/23/2020 0553  Gross per 24 hour   Intake 3335 ml   Output 1050 ml   Net 2285 ml       Patient Vitals for the past 96 hrs (Last 3 readings):   Weight   03/20/20 1630 172 lb (78 kg)   03/20/20 1530 178 lb 9.2 oz (81 kg)         Recent Labs     03/21/20  0644   WBC 9.6   HGB 9.2*   HCT 29.6*   MCV 90.2        Recent Labs     03/21/20  0644      K 3.2*   CL 95*   CO2 21*   BUN 28*   CREATININE 1.2     Recent Labs     03/21/20  0644 03/21/20  1230   PROT 5.8* 5.9*   ALKPHOS 67  --    ALT 8  --    AST 13  --    BILITOT 0.6  --          -----------------------------------------------------------------  RAD:   Results for orders placed during the hospital encounter of 03/20/20   XR CHEST PORTABLE    Narrative Patient MRN: [id number]  : 1945  Age:  76 years  Gender: Male  Order Date: 3/21/2020 12:15 PM  Exam: XR CHEST PORTABLE  Number of Images: 1 view  Indication:   right thoracentesis   right thoracentesis   Comparison: Prior study from 2020 is available    Findings: The lungs demonstrate moderate right-sided pleural effusion. There is  minimal left-sided pleural effusion seen. The remaining lung fields  are clear. .  The pulmonary vascularity is unremarkable. The cardiac,  hilar and mediastinal silhouettes are satisfactory. There is uncoiling  atherosclerotic change of thoracic aorta. The bony thorax demonstrates  no gross abnormality.         Impression Moderate right-sided pleural effusion small left-sided pleural  effusion    No evidence of airspace consolidation or pulmonary venous congestion         Micro:  Recent Labs     20  2149   BC 24 Hours- no growth     Recent Labs     20  2135   ORG Pseudomonas aeruginosa*     Recent Labs     20  1422   BLOODCULT2 24 Hours- no growth     Recent Labs     20  2135   ORG Pseudomonas aeruginosa*       Recent Labs     20   LABURIN >100,000 CFU/ml          Additional Respiratory  Assessments  Pulse: 99  Resp: 18  SpO2: 96 %    Objective:   Vitals:   Vitals:    20 1106   BP:    Pulse:    Resp:    Temp:    SpO2: 96%      TEMP:Current: Temp: 97.8 °F (36.6 °C)  Max: Temp  Av.1 °F (36.7 °C)  Min: 97.8 °F (36.6 °C)  Max: 98.3 °F (36.8 °C)    BP Range: Systolic (43KRE), OHM:879 , Min:163 , IMR:307     Diastolic (07GLW), ZSK:69, Min:80, Max:93      General appearance: alert, appears stated age and cooperative  In no acute distress  Skin: No rashes or lesions  HEENT: mucous membranes are moist  Neck: No JVD  Lungs: symmetrical expansion, decreased L base to auscultation, no use of accessory muscles  Heart: S1S2 no murmurs,  Abdomen: soft, non tender,   Extremities: no peripheral edema  Neurologic: Alert, oriented times 3,  Affect: pleasant      Assessment:   Patient Active Problem List:  76 y.o. M w h/o arthritis, asthma, HTN,  PAF,  prostate cancer with metastasis from prostate,  and Sleep apnea. Presents from SNF with lethargy, c ough, with associated phlegm, shortness of breath and with temperature 101.5    3/20 CT abdomen/pelvis diffuse metastatic malignancy in the abdomen and pelvis with the  multiple hypodense infiltrative hepatic lesions with the rim of soft tissue mass surrounding the liver concerning for diffuse hepatic malignancy. Large necrotic mass in the left kidney concerning for renal cell carcinoma with the moderately enlarged retroperitoneal and inguinal  Lymphadenopathy. A large lobulated rectal mass concerning for malignancy. There is also  enlarged prostate gland concerning for malignancy with possible malignancy involving the urinary bladder which is  thickened with hydronephrosis. Infiltrates and pleural effusion right lung base with a masslike density in the right lung base which may represent focal consolidation versus malignancy. Extensive skeletal metastasis. 3/20 T-max 103  3/21 underwent thoracentesis 175 cc. CT head negative    1. 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 Treated with Carbo and -16 from May 20, 2019 to Sept 18, 2019 and had almost CR on PET scan July 2019. He had recurrent disease in the liver Feb 2020. Started Keytruda Feb 13, 2020 and has had 2 cycles so far, last on 3/16/2020.  2. Right effusion s/p tap exudative with   3. Recurrent UTIs has chronic Milner due to bladder outlet obstruction from prostate cancer now with Pseudomonas  4.  Metastatic prostate cancer currently stable on Xtandi since April 2019, completed palliative radiation to the lumbar sacral spine April 2019.   5. SANAZ on PA P      6. H/o radiation proctosigmoiditis, had a recent flexible Sigmoidoscopy in Feb 2020. 7. Recent admission  @ [facility] for urosepsis earlier in March. Plan:   1. Appreciate oncology input liver lesion increased in size  2. Cytology pending pleural fluid   3. Treating possible urosepsis and pneumonia as per ID  4.  Pt is on Room air now     [facility]

## 2020-03-24 NOTE — PROGRESS NOTES
Physical Therapy  Physical Therapy  Name: Grover Russell  : 1945  MRN: [id number]    Referring Provider: Jose Crawley MD    Date of Service: 3/24/2020    Evaluating PT: Miley Severinoavan, PT, DPT, [id number]    Room #: 8002/6534-G  Diagnosis: UTI  PMHx/PSHx: OA, HTN, prostate CA, asthma  Procedures/Surgeries: Thoracentesis (3/21)  Precautions: Fall risk, incontinent of feces, yanez, alarm    SUBJECTIVE:    Pt is questionable historian. Per pt, pt is from [place] [id number]. Pt lives with significant other in a single story house with 4 stair(s) and 1 rail(s) to enter. Bed is on first floor and bath is on first floor. Pt ambulated with [facility] for short distances with staff assistance at [place] [id number] prior to admission. Pt ambulated without AD prior to SWETHA. OBJECTIVE:   Initial Evaluation  Date: 3/23/20 Treatment Date: 3/24  Short Term/ Long Term   Goals   AM-PAC 6 Clicks 79/84     Was pt agreeable to Eval/treatment? Yes yes    Does pt have pain? -8/10 lower back \"ache\" 6/10     Bed Mobility  Rolling: Min A  Supine to sit: Max A  Sit to supine: Max A  Scooting: Max A toward EOB Rolling ModA  Supine to sitMaxA  Sit to supine MaxA  Scooting MaxA Rolling: SBA  Supine to sit: SBA  Sit to supine: SBA  Scooting: SBA   Transfers Sit to stand: Max A  Stand to sit: Max A  Stand pivot: NT N/T pt refused. Sit to stand: Min A  Stand to sit: Min A  Stand pivot: Min A with [facility]   Ambulation   NT N/T >25 feet with [facility] with Mod A   Stair negotiation: ascended and descended NT N/T 4 step(s) with 1 rail(s) with Mod A   ROM BUE: Refer to OT note  BLE: WFL     Strength BUE: Refer to OT note  BLE: NT     Balance Sitting EOB: SBA to Mod A  Dynamic Standing: NT Sitting EOB ModA Sitting EOB: Supervision  Dynamic Standing: Min A with [facility]     Pt is A & O x: 4 to person, place, month/year, and situation. Sensation: Pt reports chronic toes numbness.    Edema: NT    Patient education  Pt educated on OOB activities and to participate with therapy  Patient response to education:   Pt verbalized understanding Pt demonstrated skill Pt requires further education in this area   Yes With cueing Yes     ASSESSMENT:    Comments:   Pt cleared by nurse prior to tx session. First attempt pt soiled and incontinent of bowel. Nurse notified. Second attempt pt in bed and agreed to tx session. Pt required ModA for rolling and MaxA for supine <> sit. Pt sat EOB for 7 minuted varied from Omari to [address] as pt leans and pushes posterior even with cues to correct. Pt self limiting. Pt refused to attempt to stand. Pt assisted back to bed and repositioned. Bed alarm applied. Pt was left supine in bed with all needs met at conclusion of session. Treatment:  Patient practiced and was instructed in the following treatment:     Therapeutic activities: Pt completed all therapeutic activities noted above. Pt required cues for technique for bed mobility and sitting balance. Pt had difficulty maintaining sitting balance even with cues. Pt requires much assistance at this time. PLAN:    PT care will be provided in accordance with the objectives noted above. Exercises and functional mobility practice will be used as well as appropriate assistive devices or modalities to obtain goals. Patient and family education will also be administered as needed. Frequency of treatments: 2-5x/week x 3-5 days.     Time in: 1435  Time out: 1450    Total Treatment Time 15 minutes       CPT codes:  [] Low Complexity PT evaluation 16658  [] Moderate Complexity PT evaluation 33676  [] High Complexity PT evaluation 89525  [] PT Re-evaluation 70955  [] Gait training 39589 0 minutes  [] Manual therapy 93672 0 minutes  [x] Therapeutic activities 88008 15 minutes  [] Therapeutic exercises 40269 0 minutes  [] Neuromuscular reeducation 05674 0 minutes     [facility] [id number]

## 2020-03-24 NOTE — PLAN OF CARE
Problem: Falls - Risk of:  Goal: Will remain free from falls  Description: Will remain free from falls  Outcome: Met This Shift     Problem: Falls - Risk of:  Goal: Absence of physical injury  Description: Absence of physical injury  Outcome: Met This Shift     Problem: Airway Clearance - Ineffective:  Goal: Clear lung sounds  Description: Clear lung sounds  Outcome: Met This Shift     Problem: Airway Clearance - Ineffective:  Goal: Ability to maintain a clear airway will improve  Description: Ability to maintain a clear airway will improve  Outcome: Met This Shift     Problem: Pain:  Goal: Pain level will decrease  Description: Pain level will decrease  Outcome: Ongoing     Problem: Pain:  Goal: Control of acute pain  Description: Control of acute pain  Outcome: Ongoing     Problem: Pain:  Goal: Control of chronic pain  Description: Control of chronic pain  Outcome: Ongoing     Problem: Urinary Elimination:  Goal: Signs and symptoms of infection will decrease  Description: Signs and symptoms of infection will decrease  Outcome: Ongoing     Problem: Urinary Elimination:  Goal: Complications related to the disease process, condition or treatment will be avoided or minimized  Description: Complications related to the disease process, condition or treatment will be avoided or minimized  Outcome: Ongoing     Problem: Urinary Elimination:  Goal: Ability to reestablish a normal urinary elimination pattern will improve - after catheter removal  Description: Ability to reestablish a normal urinary elimination pattern will improve  Outcome: Not Met This Shift

## 2020-03-24 NOTE — PROGRESS NOTES
PM  Exam: XR CHEST PORTABLE  Number of Images: 1 view  Indication:   right thoracentesis   right thoracentesis   Comparison: Prior study from 2020 is available    Findings: The lungs demonstrate moderate right-sided pleural effusion. There is  minimal left-sided pleural effusion seen. The remaining lung fields  are clear. .  The pulmonary vascularity is unremarkable. The cardiac,  hilar and mediastinal silhouettes are satisfactory. There is uncoiling  atherosclerotic change of thoracic aorta. The bony thorax demonstrates  no gross abnormality. Impression Moderate right-sided pleural effusion small left-sided pleural  effusion    No evidence of airspace consolidation or pulmonary venous congestion         Micro:    Recent Labs     20  1422   BLOODCULT2 24 Hours- no growth          Additional Respiratory  Assessments  Pulse: 108  Resp: 18  SpO2: 94 %    Objective:   Vitals:   Vitals:    20 0809   BP:    Pulse:    Resp:    Temp:    SpO2: 94%      TEMP:Current: Temp: 97.4 °F (36.3 °C)  Max: Temp  Av.7 °F (36.5 °C)  Min: 97.4 °F (36.3 °C)  Max: 97.9 °F (36.6 °C)    BP Range: Systolic (93DVD), ISX:383 , Min:163 , JRX:769     Diastolic (76EUE), LB, Min:80, Max:103         General appearance: alert, appears stated age and cooperative  In no acute distress  Skin: No rashes or lesions  HEENT: mucous membranes are moist  Neck: No JVD  Lungs: symmetrical expansion, decreased L base to auscultation, no use of accessory muscles  Heart: S1S2 no murmurs,  Abdomen: soft, non tender,   Extremities: no peripheral edema  Neurologic: Alert, oriented times 3,  Affect: pleasant        Assessment:   Patient Active Problem List:  76 y.o. M w h/o arthritis, asthma, HTN,  PAF,  prostate cancer with metastasis from prostate,  and Sleep apnea.    Presents from SNF with lethargy, c ough, with associated phlegm, shortness of breath and with temperature 101. 5     3/20 CT abdomen/pelvis diffuse metastatic malignancy in the abdomen and pelvis with the  multiple hypodense infiltrative hepatic lesions with the rim of soft tissue mass surrounding the liver concerning for diffuse hepatic malignancy. Large necrotic mass in the left kidney concerning for renal cell carcinoma with the moderately enlarged retroperitoneal and inguinal  Lymphadenopathy. A large lobulated rectal mass concerning for malignancy. There is also  enlarged prostate gland concerning for malignancy with possible malignancy involving the urinary bladder which is  thickened with hydronephrosis. Infiltrates and pleural effusion right lung base with a masslike density in the right lung base which may represent focal consolidation versus malignancy. Extensive skeletal metastasis. 3/20 T-max 103  3/21 underwent thoracentesis 175 cc. CT head negative     1. 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  Treated with Carbo and -16 from May 20, 2019 to Sept 18, 2019 and had almost CR on PET scan July 2019. He had recurrent disease in the liver Feb 2020. Started Keytruda Feb 13, 2020 and has had 2 cycles so far, last on 3/16/2020. Lliver lesion increased in size on this current CT of abd  2. Right effusion s/p tap exudative with   3. Recurrent UTIs has chronic Milner due to bladder outlet obstruction from prostate cancer now with Pseudomonas   4. Metastatic prostate cancer currently stable on Xtandi since April 2019, completed palliative radiation to the lumbar sacral spine April 2019.   5. SANAZ on PA P      6. H/o radiation proctosigmoiditis, had a recent flexible Sigmoidoscopy in Feb 2020. 7. Recent admission  @ [facility] for urosepsis earlier in March.         Plan:   1. Cytology  pleural fluid negative for malignancy   2.  Treating possible urosepsis and pneumonia as per ID  3. Pt is on Room air now   4.  OK to d/c as per ID        Loretta LoredoSaint Cabrini HospitalP

## 2020-03-24 NOTE — PROGRESS NOTES
Palliative Care Department  Palliative Care Initial Consult  Provider: [address] [place] [address] Day: 5    Referring Provider Eugenia Howe MD    Reason for Consult:  []  Code status Discussion   [x]  Assist with goals of care  [x]  Psychosocial support  [x]  Symptom Management  []  Advanced Care Planning    Chief Complaint: Kayleigh Velasquez is a 76 y.o. male with chief complaint of fever    Assessment/Plan      Active Hospital Problems    Diagnosis Date Noted    Cancer related pain [G89.3]     UTI (urinary tract infection) [N39.0] 03/20/2020   prostate ca with mets  Cancer related pain  Pleural effusion s/p thoracentesis  Having loose watery stools-consider order stool c-dif  Increase size in liver lesion    Palliative Care Encounter/Recommendations:      - Goals of care: improve or maintain function/quality of life and continue current management     - Code Status: DNR-CCA- attending will need to complete DNR form for discharge   - Symptom Management:      - Tylenol 650 mg q 4 prn   -  increase Fentanyl to 37 mcg q 72   - would benefit from outpatient pall med       OARRS checked       Subjective:     HPI:  Kayleigh Velasquez is a 76 y.o. male with significant past medical history of Arthritis, Asthma, GIB , Hypertension, Paroxysmal atrial flutter, Primary prostate cancer with metastasis from prostate to other site on Keytruda and Xgeva and Sleep apnea who presented with fever from SNF. He has also had fatigue, productive cough, sob. He was being treated for UTI outpatient and UA remains positive in ED. Imaging in ED showed infiltrates, pleural effusion, liver lesion, necrotic mass left kidney, bone mets, rectal mass as well as a possible lung mass. Procalcitonin elevated. He had a thoracentesis. Per surgery note s/o reported rectal mass has been worked up before and was not cancerous. 3/22/20 Subjective/Events/Discussions:  Alert. He lives with s/o and needs some help with ADLs at baseline.  He reports that some of the findings on ct scan are not new. He has not been on cancer treatment over last few weeks due to hospital stay and rehab. He reports achy pain to right shoulder and right rib. It is a 7/10 at baseline. He does not feel prn tylenol is doing much. Agreeable to add norco q 6 prn along with fentanyl patch. He has tried norco before. It works but makes him sleepy. He has had loose water stools over last few days. RN reports he had to watery bm yesterday. He will likley go back to finish rehab and wants to restart cancer tx when able. He has advanced directives. We discussed code status. Recommended CCA in patient with advanced cancer, he will discuss further with family. Spoke with daughter, Kayla Zuñiga on the phone. She thought per his living will he was already a DNR. I explained that living will pertains to certain circumstances. She has spoken to her farther and he would not want chest compressions. She is unsure about short term intubation but he would not want long term vent support. We reviewed code status options and will change to CCA. I will discuss intubation with patient tomorrow. She reports norco made him so sleepy he could not stay awake to participate in therapy. Will d/c. Dr Ariana Bal started fentanyl patch and she would like me to confer with her before adding any other medication. She is also asking if urology can be consulted for Lurpon infection. Will defer to attending. 3/23/2020  Achy pain to right shoulder and right rib currently 5/10. He feels pain is somewhat manageable but is also asking about prn IV morphine. Pain can also be sharp and shooting at times. He reports neurontin and lyrica have not worked in the past.  I discussed this with is daughter with his permission. Option for pain control include increasing fentanyl patch or adding low dose steroid.  Will check with oncology per daughter request.  We discussed code staus again and he is okay with short term intubation. 3/24/2020  Alert. Had achy pain 5/10 per RN pain assessment. RN in room working with patient. Discussed increase of fentanyl patch. He is in agreement. ROS: UNLESS STATED ABOVE PATIENT DENIES:  CONSTITUTIONAL:  fever, chill, rigors, nausea, vomiting, fatigue. RESPIRATORY: cough, shortness of breath, sputum expectoration. CARDIOVASCULAR:  Chest pain/pressure, palpitation, syncope, irregular beats  GASTROINTESTINAL:  abdominal or rectal pain, diarrhea, constipation, . GENITOURINARY:  Burning, frequency, urgency, incontinence,   INTEGUMENTARY: rash, wound, pruritis  MUSCULOSKELETAL:  pain, edema, joint swelling or redness  NEUROLOGICAL:  light headed, dizziness, weakness, tremors    Objective:     Physical Exam  BP (!) 180/103   Pulse 108   Temp 97.4 °F (36.3 °C) (Oral)   Resp 18   Ht 5' 10\" (1.778 m)   Wt 172 lb (78 kg)   SpO2 94%   BMI 24.68 kg/m²     Gen:  Alert, appears stated age, well nourished, in no acute distress  HEENT:  Normocephalic, conjunctiva pink, no drainage, mucosa moist  Neck:  Supple  Lungs:  Unlabored  Heart[de-identified]  Reg  Abd:   non distended,   Ext:  Moving all extremities, no edema, pulses present  Skin:  Warm and dry  Neuro:  PERRL, Alert, oriented x 3; following commands  yanez  Inpatient/Home medications were reviewed      Objective data reviewed: labs, images, records, medication use, vitals and chart      - Advanced Directives: yes   -Surrogate/Legal NOK: Child and HCPOA    Contacts:  Michelle Sanford    - Spiritual assessment: No spiritual distress identified     - Discharge planning: discharge to ECF    - Prognosis: Guarded    - Referrals to: none today    Time/Communication  Greater than 51% of time spent, total 15 minutes in counseling and coordination of care at the bedside regarding symptom management.     Michaela MCNULTY-NEGRITA  Palliative Medicine    Discussed patient and the plan of care with the other IDT members of Palliative Med team and with patient and floor nurse. Thank you for allowing Palliative Medicine to participate in the care of Sedrick Edge. Note: This report was completed using computerCloudOn voiced recognition software. Every effort has been made to ensure accuracy; however, inadvertent computerized transcription errors may be present.

## 2020-03-24 NOTE — PROGRESS NOTES
Pharmacy Consultation Note  (Antibiotic Dosing and Monitoring)    Initial consult date: 3-  Consulting physician: Kiko  Drug: Vancomycin  Indication: UTI    Age/  Gender Height Weight IBW Dosing weight  Allergy Information   74 y.o./male 5' 10\" (177.8 cm) 178 lb 9.2 oz (81 kg)     Ideal body weight: 73 kg (160 lb 15 oz)  Adjusted ideal body weight: 75 kg (165 lb 5.8 oz)    Adhesive tape; Iodine; Irbesartan; Lisinopril; and Shellfish-derived products      Temp (24hrs), Av.7 °F (37.1 °C), Min:96.7 °F (35.9 °C), Max:100.7 °F (38.2 °C)          Date  WBC BUN SCr CrCl  (mL/min) Drug/Dose Time   Given Level(s)   (Time) Comments   3/20 13 31 1.3  51 Vancomycin 1500 mg IV x 1 1842     3/21 --- 28 1.2 56 Vancomycin 750 mg IV q 12 hr 0552  <1800>     3/22 --- --- --- 56 Vancomycin 750 mg IV q 12 hr 0535      1822 Trough = 28.7 mcg/mL    Trough = 15.3 mcg/mL @ 1719 Trough drawn while vanc infusing      Hold vancomycin if trough > 20 mcg/mL   3/23 --- --- --- --- Vancomycin 750 mg IV q 12 hr 0540  1743     3/24 --- --- --- --- Vancomycin 750 mg IV q 12 hr 0600  <1800>           Intake/Output Summary (Last 24 hours) at 3/20/2020 1726  Last data filed at 3/20/2020 1555  Gross per 24 hour   Intake 1300 ml   Output --   Net 1300 ml   UOP: 0.9 mL/kg/hr per last 24 hours documented    Historical Cultures:  Organism   Date Value Ref Range Status   2020 GNR oxidase positive (A)  Preliminary     No results for input(s): BC in the last 72 hours. Cultures:  available culture and sensitivity results were reviewed in [facility]  3/20 Blood #1 24 hours no growth  3/20 Urine  Pseudomonas aeruginosa  3/20 Blood #1 24 hours no growth   3/20 Blood #2 24 hours no growth    Assessment:  · 76 y.o. male with a PMH of arthritis, asthma, GIB, HTN, atrial flutter, and metastatic prostate cancer presented to [facility] ([facility] on 3/20 from [facility], [place] complaining of fatigue and fever (101.5F). Admitted for pneumonia and UTI.  Vancomycin has been initiated. · Estimated CrCl = 56 mL/min  · Goal trough level = 15-20 mcg/mL  · sCr = 1.2 mg/dL; baseline ~ 0.8 mg/dL  · 3/23: Trough yesterday morning = 28.7 mcg/mL, however, RN alerted pharmacy that trough was drawn while vancomycin was infusing. Will have trough re-drawn this afternoon. Trough redrawn in the evening of 3/22. Trough = 15.3 mcg/mL. Plan:  · Vancomycin 750 mg Q12H. · Will check vancomycin trough level as necessary.   · Monitor renal function   · Clinical pharmacy to follow      Moni Buck PharmD 3/24/2020 2:15 PM

## 2020-03-24 NOTE — PROGRESS NOTES
I agree with Amberly's progress note. I did review the most recent CAT scan quite impressive actually more so for pelvic findings. With both significant bladder thickening and also pelvic mass. Probably 1 would think a combination of the prostatic cancer and probably metastatic neuroendocrine tumor. Patient was just started on immunological therapy. Would consider reinitiate that as an outpatient if he improves.

## 2020-03-24 NOTE — PROGRESS NOTES
patch, Transdermal, Q72H **AND** fentaNYL (DURAGESIC) 25 MCG/HR 1 patch, 1 patch, Transdermal, Q72H, Maria D Cheese, APRN - CNP    potassium chloride (KLOR-CON M) extended release tablet 20 mEq, 20 mEq, Oral, BID WC, Layla Paz MD, 20 mEq at 03/23/20 1743    amiodarone (CORDARONE) tablet 200 mg, 200 mg, Oral, Daily, Layla Paz MD, 200 mg at 03/23/20 1221    calcium elemental (OSCAL) tablet 500 mg, 1 tablet, Oral, BID, Layla Paz MD, 500 mg at 03/23/20 2116    enzalutamide (XTANDI) capsule 160 mg, 160 mg, Oral, Dinner, Layla Paz MD, 160 mg at 03/23/20 1743    montelukast (SINGULAIR) tablet 10 mg, 10 mg, Oral, Nightly, Layla Paz MD, 10 mg at 03/23/20 2116    oxybutynin (DITROPAN-XL) extended release tablet 10 mg, 10 mg, Oral, Daily PRN, Layla Paz MD    vitamin C (ASCORBIC ACID) tablet 500 mg, 500 mg, Oral, Daily, Layla Paz MD, 500 mg at 03/23/20 4370    sodium chloride flush 0.9 % injection 10 mL, 10 mL, Intravenous, 2 times per day, Layla Paz MD, 10 mL at 03/23/20 2116    sodium chloride flush 0.9 % injection 10 mL, 10 mL, Intravenous, PRN, Layla Paz MD, 10 mL at 03/20/20 1726    acetaminophen (TYLENOL) tablet 650 mg, 650 mg, Oral, Q6H PRN, 650 mg at 03/23/20 2120 **OR** acetaminophen (TYLENOL) suppository 650 mg, 650 mg, Rectal, Q6H PRN, Layla Paz MD    polyethylene glycol (GLYCOLAX) packet 17 g, 17 g, Oral, Daily PRN, Layla Paz MD    promethazine (PHENERGAN) tablet 12.5 mg, 12.5 mg, Oral, Q6H PRN **OR** ondansetron (ZOFRAN) injection 4 mg, 4 mg, Intravenous, Q6H PRN, Layla Paz MD, 4 mg at 03/23/20 1751    enoxaparin (LOVENOX) injection 40 mg, 40 mg, Subcutaneous, Daily, Layla Paz MD, 40 mg at 03/23/20 0905    ipratropium (ATROVENT) 0.02 % nebulizer solution 0.5 mg, 0.5 mg, Nebulization, Q4H WA, Bakari Hoover MD, 0.5 mg at 03/24/20 0808    piperacillin-tazobactam (ZOSYN) 3.375 g in dextrose 5 % 100 mL IVPB extended infusion (mini-bag), 3.375 g, Intravenous, Q8H, Stopped at 03/24/20 0444 **AND** 0.9 % sodium chloride infusion admixture, , Intravenous, Q8H, Gordo Marcial MD, Stopped at 03/24/20 0714    [COMPLETED] vancomycin 1.5 g in dextrose 5% 300 mL IVPB, 1,500 mg, Intravenous, Once, Stopped at 03/20/20 2117 **FOLLOWED BY** vancomycin (VANCOCIN) 750 mg in dextrose 5 % 250 mL IVPB, 750 mg, Intravenous, Q12H, Gordo Marcial MD, Stopped at 03/24/20 0700    0.9 % sodium chloride infusion, , Intravenous, Continuous, Rosie Hudson DO, Last Rate: 125 mL/hr at 03/23/20 1322    Assessment:    Active Problems:    UTI (urinary tract infection)    Cancer related pain  Resolved Problems:    * No resolved hospital problems.  *    73 yo gentleman known to Dr. Elba Siddiqui with hx of stage IV prostate cancer involving the bone, currently stable on Xtandi since April 2019, completed palliative radiation to the lumbar sacral spine April 2019.  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  Treated with Carbo and -16 from May 20, 2019 to Sept 18, 2019 and had almost CR on PET scan July 2019.  Had recurrent disease in the liver Feb 2020. Started Keytruda Feb 13, 2020 and has had 2 cycles so far, last on 3/16/2020.  He was recently at [facility] for urosepsis earlier in March. Ny Lowry has hx of Radiation proctosigmoiditis, had a recent flexible Sigmoidoscopy in Feb 2020.  -Admitted from SNF with increased SOB, cough, fever 101.5 along with abdominal pain.  CT abd/pelvis diffuse metastatic malignancy with multiple hepatic lesions.  Large necrotic mass in left kidney with moderately enlarged retroperitoneal and inguinal LAD. [place] lobulated rectal mass.  Also enlarged prostate gland with possible malignancy involving urinary bladder which is thickened with hydronephrosis.  Infiltrates and pleural effusion right lung base with masslike density in the right lung base.  Extensive skeletal metastasis.  He underwent thoracentesis 3/21/2020  -Compared to prior CT from [facility] (2/4/20), he has a new pleural effusion and the dominant liver lesion appears to have increased from 5.9 x 5.2 cm to 6 x 10 cm.      Plan:  Monitor CBC   F/u cytology from thoracentesis   IV ABX for UTI and PNA, I.D on board. Patient on Vancomycin and Zosyn  Will continue to follow  Patient will f/u with Dr. Neris Strong upon discharge.      Electronically signed by HAMLET Thao NP on 3/24/2020 at 8:09 AM

## 2020-03-24 NOTE — PROGRESS NOTES
OT BEDSIDE TREATMENT NOTE      Date:3/24/2020  Patient Name: Deny Cosby  MRN: [id number]  : 1945  Room: [address] [place] [address]-     Per OT Eval:    Evaluating [facility], OTR/L #[id number]     AM-PAC Daily Activity Raw Score:   Recommended Adaptive Equipment: TBD      Diagnosis: UTI (urinary tract infection) [N39.0]     Referring physician: Hayley Gold MD  Procedures: thoracentesis on 3/21/20  Pertinent Medical History: arthritis, asthma, GIB, HTN, A-flutter, prostate CA w/ mets     Precautions:  Falls, bed alarm, incontinent     Home Living: Pt admitted from [address] [place] [address] Road owned: w/w     Prior Level of Function: assistance with ADLs; ambulated short distances w/ w/w and staff assistance  Per pt, ate most meals in room - not in dining room  Driving: no     Pain Level: Pt did not complain of pain this session     Cognition: A&O: 3/4; Follows 1 step directions              Memory:  fair               Sequencing:  fair               Problem solving:  fair -              Judgement/safety:  fair -                Functional Assessment:    Initial Eval Status  Date: 3/23/20 Treatment Status  Date: 3/24/20 Short Term Goals/LTG  Treatment frequency: 1-4x/wk   Feeding Supervision   Supervision Modified [place]    Grooming Minimal Assist   seated  [place]  Pt washed face seated EOB Modified [place]    UB Dressing Moderate Assist   maxA  [facility] gown seated EOB Stand by Assist    LB Dressing Dependent   Dependent  [facility] socks Moderate Assist    Bathing Maximal Assist  DNT  maxA per previous level Minimal Assist    Toileting Dependent   Including hygiene  Incontinent of stool, yanez  Dependent  Pt having of yanez catheter Moderate Assist    Bed Mobility  Rolling:  Moderate Assist   Supine to sit: Maximal Assist   Sit to supine: Maximal Assist   maxA  Supine<>EOB  EOB<>Supine Rolling: Minimal Assist   Supine to sit: Minimal Assist   Sit to supine: Minimal Assist    Functional Transfers Max A

## 2020-03-24 NOTE — PROGRESS NOTES
Hospital Medicine Progress Note      Date of Admission: 3/20/2020  Hospital day # 4  Current review of consultant treatment and intervention:  Oncology:  · Stage IV prostate cancer  · Stage IV poorly differentiated metastatic neuroendocrine carcinoma of the lung  · Metastasis to the liver  · Diffuse pelvic and hepatic metastases  Infectious disease:  · Currently treated for catheter related UTI Pseudomonas Zosyn sensitive  Pulmonary medicine  · Right pleural effusion with thoracentesis      Course: Follow-up on UTI, pneumonia, sepsis gross, metastatic cancer    Subjective  Patient more awake and cooperative he is not oriented to place cheerful and appears to be quite comfortable  Refers no shortness of breath. No events overnight as per nurse      Exam:    BP (!) 180/103   Pulse 108   Temp 97.4 °F (36.3 °C) (Oral)   Resp 18   Ht 5' 10\" (1.778 m)   Wt 172 lb (78 kg)   SpO2 94%   BMI 24.68 kg/m²     General appearance: No apparent distress, appears stated age and cooperative. HEENT: Pupils equal, round, and reactive to light. Conjunctivae/corneas clear. Neck: Supple. No jugular venous distention. Trachea midline. Respiratory:  Normal respiratory effort. Clear to auscultation, bilaterally   Cardiovascular: S1/S2  Abdomen: Soft, non-tender, non-distended with normal bowel sounds. Musculoskeletal: No clubbing, cyanosis or edema bilaterally. Brisk capillary refill. 2+ lower extremity pulses (dorsalis pedis).    Skin:  No rashes    Neurologic: awake, alert and following commands     Medications:  Reviewed    Infusion Medications    sodium chloride 125 mL/hr at 03/24/20 0945     Scheduled Medications    fentaNYL  1 patch Transdermal Q72H    And    fentaNYL  1 patch Transdermal Q72H    potassium chloride  20 mEq Oral BID     amiodarone  200 mg Oral Daily    calcium elemental  1 tablet Oral BID    enzalutamide  160 mg Oral Dinner    montelukast  10 mg Oral Nightly    vitamin C  500 mg Oral Daily    sodium chloride flush  10 mL Intravenous 2 times per day    enoxaparin  40 mg Subcutaneous Daily    ipratropium  0.5 mg Nebulization Q4H WA    piperacillin-tazobactam  3.375 g Intravenous Q8H    And    sodium chloride   Intravenous Q8H    vancomycin  750 mg Intravenous Q12H     PRN Meds: oxybutynin, sodium chloride flush, acetaminophen **OR** acetaminophen, polyethylene glycol, promethazine **OR** ondansetron    I/O    Intake/Output Summary (Last 24 hours) at 3/24/2020 1224  Last data filed at 3/24/2020 1112  Gross per 24 hour   Intake 220 ml   Output 2250 ml   Net -2030 ml       Labs:   No results for input(s): WBC, HGB, HCT, PLT in the last 72 hours. No results for input(s): NA, K, CL, CO2, BUN, CREATININE, CALCIUM, PHOS in the last 72 hours. Invalid input(s): ARTURO    Recent Labs     03/21/20  1230   PROT 5.9*       No results for input(s): INR in the last 72 hours. No results for input(s): [place] Speaks in the last 72 hours. Other labs:  Lab Results   Component Value Date    TSH 3.340 10/30/2019    INR 1.0 10/30/2019       Radiology:  Imaging studies reviewed today.     ASSESSMENT:  UTI catheter related  Rt Pleural effusion  Hepatic carcinoma by history  Diffuse pelvic and abdominal metastatic disease  · Stage IV prostate cancer  · Stage IV poorly differentiated metastatic neuroendocrine carcinoma of the lung  · Metastasis to the liver  Diffuse pelvic and hepatic metastases      PLAN:  Urine culture showing Pseudomonas  S/p Thoracentesis 3/21/20  Piperacillin/tazobactam IV  Vancomycin IV pharmacy to dose  Continue to follow Blood cultures  Blood cultures negative so far  ID following  Pulmonary following  Palliative has been consulted and reviewed  Oncology has been consulted and reviewed  Records from [facility] has been requested  Patient requires current labs  Patient currently is DNR CCA    Blood pressure is currently climbing and the patient is receiving 100 cc of normal saline per hour  Will evaluate current labs and reduce the intravenous fluids for now      Diet: DIET GENERAL;  Dietary Nutrition Supplements: Frozen Oral Supplement  Dietary Nutrition Supplements: Low Calorie High Protein Supplement  Code Status: DNR-CCA    PT/OT Eval Status: [] Ordered [] Evaluation noted [x] Not applicable    DVT Prophylaxis: [x]Lovenox []Heparin []PCD [] [address] []Encouraged ambulation []N/A    Likely disposition when able:  [x]Home [] Home with [facility] [] SNF/SWETHA [] Acute Rehab [] LTAC []Other    +++++++++++++++++++++++++++++++++++++++++++++++++  Allen Olmedo MD, Hospitalist  +++++++++++++++++++++++++++++++++++++++++++++++++  NOTE: This report was transcribed using voice recognition software.  Every effort was made to ensure accuracy; however, inadvertent computerized transcription errors may be present.

## 2020-03-24 NOTE — PROGRESS NOTES
ID Progress Note                [address] [facility], [place], [address]            Phone [phone]     Fax [phone]      Chief complaint   Fever/fatigue    Subjective:  More alert and awake  The patient is awake and alert. Tolerating medications. Reports no side effects. Afebrile. 10 ROS otherwise negative unless otherwise specified above. Objective:    Vitals:    03/24/20 1530   BP: (!) 157/96   Pulse: 96   Resp: 20   Temp: 96.8 °F (36 °C)   SpO2: 94%        General appearance: Alert, Awake, Oriented times 3, no distress  Skin: Warm and dry  Eyes: Pink palpebral conjunctivae. PERRL  Ears: External ears normal.  Nose/Sinuses: Nares normal. Septum midline. Mucosa normal. No sinus tenderness. Oropharynx: Oropharynx clear with no exudates seen  Neck: Neck supple. No jugular venous distension, lymphadenopathy or thyromegaly Trachea midline  Lungs: Lungs clear to auscultation bilaterally. No rhonchi, crackles or wheezes  Heart: S1 S2  , grade 2 x 6 systolic murmur loudest at the apex   abdomen: Abdomen soft, non-tender. BS normal. No masses, organomegaly  Extremities: No edema, Peripheral pulses palpable  Musculoskeletal: Muscular strength appears intact. No joint effusion, tenderness, swelling or warmth    Labs:  No results for input(s): WBC, RBC, HGB, HCT, MCV, MCH, MCHC, RDW, PLT, MPV in the last 72 hours. CMP:    Lab Results   Component Value Date     03/21/2020    K 3.2 03/21/2020    CL 95 03/21/2020    CO2 21 03/21/2020    BUN 28 03/21/2020    CREATININE 1.2 03/21/2020    GFRAA >60 03/21/2020    LABGLOM 59 03/21/2020    GLUCOSE 198 03/21/2020    PROT 5.9 03/21/2020    LABALBU 2.8 03/21/2020    CALCIUM 8.4 03/21/2020    BILITOT 0.6 03/21/2020    ALKPHOS 67 03/21/2020    AST 13 03/21/2020    ALT 8 03/21/2020          Microbiology :  No results for input(s): BC in the last 72 hours. No results for input(s): Aleck Aries in the last 72 hours.   No results for input(s): Raciel Camacho in the last 72 hours. No results for input(s): CULTRESP in the last 72 hours. No results for input(s): WNDABS in the last 72 hours. Radiology :  XR CHEST PORTABLE   Final Result      Moderate right-sided pleural effusion small left-sided pleural   effusion      No evidence of airspace consolidation or pulmonary venous congestion         CT HEAD WO CONTRAST   Final Result      NO ACUTE INTRACRANIAL PROCESS         US THORACENTESIS   Final Result   Ultrasound guidance for right thoracentesis. CT ABDOMEN PELVIS W IV CONTRAST Additional Contrast? None   Final Result   Diffuse metastatic malignancy in the abdomen and pelvis with the   multiple hypodense infiltrative hepatic lesions with the rim of soft   tissue mass surrounding the liver concerning for diffuse hepatic   malignancy. Large necrotic mass in the left kidney concerning for renal cell   carcinoma with the moderately enlarged retroperitoneal and inguinal   lymphadenopathy. A large lobulated rectal mass concerning for malignancy. There is also   enlarged prostate gland concerning for malignancy with possible   malignancy involving the urinary bladder which is  thickened with   hydronephrosis. Infiltrates and pleural effusion right lung base with a masslike   density in the right lung base which may represent focal consolidation   versus malignancy. Consider PET CT scan. Extensive skeletal metastasis. ALERT:  THIS IS AN ABNORMAL REPORT      XR CHEST PORTABLE   Final Result   Patchy and masslike infiltrates and pleural effusions in the right   lung base concerning for complicated pneumonia. Underlying malignancy   is not excluded. Surveillance preferably by CT scan is recommended.             URINARY CATHETER OUTPUT ([facility]):  Urethral Catheter Straight-tip 16 fr-Output (mL): 550 mL          Assessment and Plan:      · UTI, catheter related, pseudomonas aeruginosa (zosyn sensitive)  · Concern for metastatic malignancy-involving lungs and liver  · Right-sided pleural effusion status post thoracentesis done on 3/21/2020  · Questionable pneumonia     Plan  - NO LABS, please check cbc/bmp  -D/C vancomycin  - PO levaquin 500mg q daily for 5  more days, watch QTc   - pleural fluid analysis, exudative, not concerning for infection  -Pulmonary follow-up  - oncology follow up   ok fro D/C rfrom ID standpoint when he's medically stable        Electronically signed by Mik Elizabeth MD on 3/24/2020 at 3:51 PM

## 2020-03-24 NOTE — PROGRESS NOTES
On-going monitoring and chart review for PM psychosocial needs.  will remain available for on-going psychosocial support, as needed.

## 2020-03-25 NOTE — PROGRESS NOTES
Pharmacy Consultation Note  (Antibiotic Dosing and Monitoring)    Initial consult date: 3-  Consulting physician: Kiko  Drug: Vancomycin  Indication: UTI    Vancomycin has been discontinued. Clinical Pharmacy will sign-off. Please re-consult Clinical Pharmacy if needed.       Lucila Crowe, PharmD 3/25/2020 11:28 AM

## 2020-03-25 NOTE — PROGRESS NOTES
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 No events overnight as per nurse    At this time the staff is notifying me of a couple of issues  -He has been having diarrhea however infectious disease is following him  -His blood pressure has been elevated    Reviewed consultants notes after the past 24 hours  Oncology will monitor the CBC. Note that the cytology from thoracentesis is negative  Infectious disease treating for catheter related UTI with Pseudomonas Zosyn sensitive  Vancomycin was DC'd Levaquin p.o. 500 mg for 5 more days while monitoring QTC  Infectious disease was okay for discharge  Pulmonary medicine has okayed discharge as well    Exam:    BP (!) 158/104   Pulse 95   Temp 98.3 °F (36.8 °C) (Temporal)   Resp 20   Ht 5' 10\" (1.778 m)   Wt 172 lb (78 kg)   SpO2 95%   BMI 24.68 kg/m²     General appearance: No apparent distress, appears stated age and cooperative. HEENT: Pupils equal, round, and reactive to light. Conjunctivae/corneas clear. Neck: Supple. No jugular venous distention. Trachea midline. Respiratory:  Normal respiratory effort. Clear to auscultation, bilaterally   Cardiovascular: S1/S2-there is an apical blowing murmur 2/6  Abdomen: Soft, non-tender, non-distended with normal bowel sounds.   Musculoskeletal: No clubbing, cyanosis or edema bilaterally. Brisk capillary refill. 2+ lower extremity pulses (dorsalis pedis). Skin:  No rashes    Neurologic: awake, alert and following commands     Medications:  Reviewed    Infusion Medications    sodium chloride 50 mL/hr at 03/24/20 1302     Scheduled Medications    lidocaine  1 patch Transdermal Daily    fentaNYL  1 patch Transdermal Q72H    And    fentaNYL  1 patch Transdermal Q72H    potassium chloride  20 mEq Oral BID WC    amiodarone  200 mg Oral Daily    calcium elemental  1 tablet Oral BID    enzalutamide  160 mg Oral Dinner    montelukast  10 mg Oral Nightly    vitamin C  500 mg Oral Daily    sodium chloride flush  10 mL Intravenous 2 times per day    enoxaparin  40 mg Subcutaneous Daily    ipratropium  0.5 mg Nebulization Q4H WA    piperacillin-tazobactam  3.375 g Intravenous Q8H    And    sodium chloride   Intravenous Q8H    vancomycin  750 mg Intravenous Q12H     PRN Meds: oxybutynin, sodium chloride flush, acetaminophen **OR** acetaminophen, polyethylene glycol, promethazine **OR** ondansetron    I/O    Intake/Output Summary (Last 24 hours) at 3/25/2020 1151  Last data filed at 3/25/2020 1001  Gross per 24 hour   Intake 2111 ml   Output 425 ml   Net 1686 ml       Labs:   Recent Labs     03/24/20  1756   WBC 8.6   HGB 11.0*   HCT 35.2*          Recent Labs     03/24/20  1756      K 3.6      CO2 19*   BUN 14   CREATININE 0.9   CALCIUM 7.4*       Recent Labs     03/24/20  1756   PROT 5.5*   ALKPHOS 65   ALT 10   AST 17   BILITOT 0.5       No results for input(s): INR in the last 72 hours. No results for input(s): Fraga Philipp in the last 72 hours. Other labs:  Lab Results   Component Value Date    TSH 3.340 10/30/2019    INR 1.0 10/30/2019       Radiology:  Imaging studies reviewed today.     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 Levaquin we will attempt to confirm this  His blood pressure is moderately elevated  Intravenous fluids were stopped or reduced  He received a single dose of diuretic and his blood pressure did respond  However staff now notes that he is having diarrhea  We will await infectious disease opinion  Another dose of diuretic since he is markedly positive with fluids  Possible addition of further medication for the blood pressure          Diet: DIET GENERAL;  Dietary Nutrition Supplements: Frozen Oral Supplement  Dietary Nutrition Supplements: Low Calorie High Protein Supplement  Code Status: DNR-CCA    PT/OT Eval Status: [] Ordered [] Evaluation noted [x] Not applicable    DVT Prophylaxis: [x]Lovenox []Heparin []PCD [] [address] []Encouraged ambulation []N/A    Likely disposition when able:  [x]Home [] Home with [facility] [] SNF/SWETHA [] Acute Rehab [] LTAC []Other    +++++++++++++++++++++++++++++++++++++++++++++++++  Thaddeus Lee MD, Hospitalist  +++++++++++++++++++++++++++++++++++++++++++++++++  NOTE: This report was transcribed using voice recognition software.  Every effort was made to ensure accuracy; however, inadvertent computerized transcription errors may be present.

## 2020-03-25 NOTE — PLAN OF CARE
Problem: Falls - Risk of:  Goal: Will remain free from falls  Description: Will remain free from falls  3/25/2020 0104 by Ernestina Quiroga RN  Outcome: Met This Shift  3/24/2020 1342 by Kate Rees RN  Outcome: Met This Shift  Goal: Absence of physical injury  Description: Absence of physical injury  3/25/2020 0104 by Ernestina Quiroga RN  Outcome: Met This Shift  3/24/2020 1342 by Kate Rees RN  Outcome: Met This Shift

## 2020-03-25 NOTE — CARE COORDINATION
I spoke to the patients Girlfriend  Jadiel Mcarthur and she wants to take the patient home . She does not want the patient to return to [facility] as she feels she can feed him better to get his strength back  At home. Per id the plan  Is home on po antibiotics and Im awaiting the ok for ID as well. I  Also called in the room to speak to the patient  And he wants to return to [facility] as he feels he is too week to go home so I asked Abilio Campbell to get the auth started now. Envelope is done.

## 2020-03-25 NOTE — PROGRESS NOTES
Palliative Care Department  Palliative Care Initial Consult  Provider: [address] [place] [address] Day: 6    Referring Provider Royal Barth MD    Reason for Consult:  []  Code status Discussion   [x]  Assist with goals of care  [x]  Psychosocial support  [x]  Symptom Management  []  Advanced Care Planning    Chief Complaint: Promise Rutherford is a 76 y.o. male with chief complaint of fever    Assessment/Plan      Active Hospital Problems    Diagnosis Date Noted    Cancer related pain [G89.3]     UTI (urinary tract infection) [N39.0] 03/20/2020   prostate ca with mets  Cancer related pain  Pleural effusion s/p thoracentesis  Having loose watery stools-consider order stool c-dif  Increase size in liver lesion    Palliative Care Encounter/Recommendations:      - Goals of care: improve or maintain function/quality of life and continue current management     - Code Status: DNR-CCA- attending will need to complete DNR form for discharge   - Symptom Management:      - Tylenol 650 mg q 4 prn   -  increase Fentanyl to 37 mcg q 72   - would benefit from outpatient pall med       OARRS checked       Subjective:     HPI:  Promise Rutherford is a 76 y.o. male with significant past medical history of Arthritis, Asthma, GIB , Hypertension, Paroxysmal atrial flutter, Primary prostate cancer with metastasis from prostate to other site on Keytruda and Xgeva and Sleep apnea who presented with fever from SNF. He has also had fatigue, productive cough, sob. He was being treated for UTI outpatient and UA remains positive in ED. Imaging in ED showed infiltrates, pleural effusion, liver lesion, necrotic mass left kidney, bone mets, rectal mass as well as a possible lung mass. Procalcitonin elevated. He had a thoracentesis. Per surgery note s/o reported rectal mass has been worked up before and was not cancerous. 3/22/20 Subjective/Events/Discussions:  Alert. He lives with s/o and needs some help with ADLs at baseline.  He reports that some of the findings on ct scan are not new. He has not been on cancer treatment over last few weeks due to hospital stay and rehab. He reports achy pain to right shoulder and right rib. It is a 7/10 at baseline. He does not feel prn tylenol is doing much. Agreeable to add norco q 6 prn along with fentanyl patch. He has tried norco before. It works but makes him sleepy. He has had loose water stools over last few days. RN reports he had to watery bm yesterday. He will likley go back to finish rehab and wants to restart cancer tx when able. He has advanced directives. We discussed code status. Recommended CCA in patient with advanced cancer, he will discuss further with family. Spoke with daughter, Rafiq Gar on the phone. She thought per his living will he was already a DNR. I explained that living will pertains to certain circumstances. She has spoken to her farther and he would not want chest compressions. She is unsure about short term intubation but he would not want long term vent support. We reviewed code status options and will change to CCA. I will discuss intubation with patient tomorrow. She reports norco made him so sleepy he could not stay awake to participate in therapy. Will d/c. Dr Frederic Enriquez started fentanyl patch and she would like me to confer with her before adding any other medication. She is also asking if urology can be consulted for Lurpon infection. Will defer to attending. 3/23/2020  Achy pain to right shoulder and right rib currently 5/10. He feels pain is somewhat manageable but is also asking about prn IV morphine. Pain can also be sharp and shooting at times. He reports neurontin and lyrica have not worked in the past.  I discussed this with is daughter with his permission. Option for pain control include increasing fentanyl patch or adding low dose steroid.  Will check with oncology per daughter request.  We discussed code staus again and he is okay with short term intubation. 3/24/2020  Alert. Had achy pain 5/10 per RN pain assessment. RN in room working with patient. Discussed increase of fentanyl patch. He is in agreement. 3/25/2020  Patient not noticing much difference with increase in fentanyl patch. Pain to right shoulder is particullary bothersome especially if he lays on it. Agreeable to try lidocaine path to right shoulder. ROS: UNLESS STATED ABOVE PATIENT DENIES:  CONSTITUTIONAL:  fever, chill, rigors, nausea, vomiting, fatigue. RESPIRATORY: cough, shortness of breath, sputum expectoration. CARDIOVASCULAR:  Chest pain/pressure, palpitation, syncope, irregular beats  GASTROINTESTINAL:  abdominal or rectal pain, diarrhea, constipation, . INTEGUMENTARY: rash, wound, pruritis  MUSCULOSKELETAL:  pain, edema, joint swelling or redness      Objective:     Physical Exam  BP (!) 158/106   Pulse 92   Temp 98 °F (36.7 °C) (Temporal)   Resp 18   Ht 5' 10\" (1.778 m)   Wt 172 lb (78 kg)   SpO2 96%   BMI 24.68 kg/m²     Gen:  Alert, appears stated age, well nourished, in no acute distress  HEENT:  Normocephalic,   Neck:  Supple  Lungs:  cta  Heart[de-identified]  Reg  Abd:   non distended,   Ext:  Moving all extremities, no edema, pulses present  Skin:  Warm and dry  Neuro:  PERRL, Alert, oriented x 3; following commands  yanez  Inpatient/Home medications were reviewed      Objective data reviewed: labs, images, records, medication use, vitals and chart      - Advanced Directives: yes   -Surrogate/Legal NOK: Child and HCPOA    Contacts:  Michelle Sanford    - Spiritual assessment: No spiritual distress identified     - Discharge planning: discharge to ECF    - Prognosis: Guarded    - Referrals to: none today    Time/Communication  Greater than 51% of time spent, total 15 minutes in counseling and coordination of care at the bedside regarding symptom management.     Formerly named [facility] APRN-[facility]  Palliative Medicine    Discussed patient and the plan of care with the other IDT members of Palliative Med team and with patient and floor nurse. Thank you for allowing Palliative Medicine to participate in the care of Inocencio Benavides. Note: This report was completed using [employer] voiced recognition software. Every effort has been made to ensure accuracy; however, inadvertent computerized transcription errors may be present.

## 2020-03-25 NOTE — PROGRESS NOTES
Subjective: The patient is awake and alert in bed about to work with occupational therapy. His fentanyl patch was increased last evening, he says he has not seen improvement in his pain. He is still having 6/10 pain in the right should, left rib cage and lower back. He is not eating and drinking much. Reports nausea but relieved with medication. Has chronic shortness of breath. No GI or  blood loss. No fever or chills. No problems overnight. Objective:    BP (!) 160/110 Comment: Manual  Pulse 95   Temp 98.3 °F (36.8 °C) (Temporal)   Resp 20   Ht 5' 10\" (1.778 m)   Wt 172 lb (78 kg)   SpO2 95%   BMI 24.68 kg/m²     General: NAD  HEENT: No thrush or mucositis, EOMI, PERRLA  Heart:  RRR, no murmurs, gallops, or rubs.   Lungs:  CTA bilaterally, no wheeze, rales or rhonchi  Abd: bowel sounds present, nontender, nondistended, no masses  Extrem: +1 BLE edema, no clubbing or cyanosis  Lymphatics: No palpable adenopathy in cervical and supraclavicular regions  Skin: Intact, no petechia or purpura    CBC with Differential:    Lab Results   Component Value Date    WBC 8.6 03/24/2020    RBC 3.88 03/24/2020    HGB 11.0 03/24/2020    HCT 35.2 03/24/2020     03/24/2020    MCV 90.7 03/24/2020    MCH 28.4 03/24/2020    MCHC 31.3 03/24/2020    RDW 17.1 03/24/2020    LYMPHOPCT 2.2 03/21/2020    MONOPCT 6.4 03/21/2020    MYELOPCT 1.7 04/21/2019    BASOPCT 0.1 03/21/2020    MONOSABS 0.61 03/21/2020    LYMPHSABS 0.21 03/21/2020    EOSABS 0.00 03/21/2020    BASOSABS 0.01 03/21/2020     CMP:    Lab Results   Component Value Date     03/24/2020    K 3.6 03/24/2020    K 3.2 03/21/2020     03/24/2020    CO2 19 03/24/2020    BUN 14 03/24/2020    CREATININE 0.9 03/24/2020    GFRAA >60 03/24/2020    LABGLOM >60 03/24/2020    GLUCOSE 152 03/24/2020    PROT 5.5 03/24/2020    LABALBU 2.7 03/24/2020    CALCIUM 7.4 03/24/2020    BILITOT 0.5 03/24/2020    ALKPHOS 65 03/24/2020    AST 17 03/24/2020    ALT 10 03/24/2020          Current Facility-Administered Medications:     fentaNYL (DURAGESIC) 12 MCG/HR 1 patch, 1 patch, Transdermal, Q72H, 1 patch at 03/24/20 1708 **AND** fentaNYL (DURAGESIC) 25 MCG/HR 1 patch, 1 patch, Transdermal, Q72H, Wallace Ruby, APRN - CNP, 1 patch at 03/24/20 1710    potassium chloride (KLOR-CON M) extended release tablet 20 mEq, 20 mEq, Oral, BID WC, Belkis Stafford MD, 20 mEq at 03/25/20 0831    amiodarone (CORDARONE) tablet 200 mg, 200 mg, Oral, Daily, Belkis Stafford MD, 200 mg at 03/25/20 0831    calcium elemental (OSCAL) tablet 500 mg, 1 tablet, Oral, BID, Belkis Stafford MD, 500 mg at 03/25/20 0831    enzalutamide (XTANDI) capsule 160 mg, 160 mg, Oral, Dinner, Belkis Stafford MD, 160 mg at 03/24/20 1702    montelukast (SINGULAIR) tablet 10 mg, 10 mg, Oral, Nightly, Belkis Stafford MD, 10 mg at 03/24/20 2029    oxybutynin (DITROPAN-XL) extended release tablet 10 mg, 10 mg, Oral, Daily PRN, Belkis Stafford MD    vitamin C (ASCORBIC ACID) tablet 500 mg, 500 mg, Oral, Daily, Belkis Stafford MD, 500 mg at 03/25/20 0831    sodium chloride flush 0.9 % injection 10 mL, 10 mL, Intravenous, 2 times per day, Belkis Stafford MD, 10 mL at 03/25/20 8197    sodium chloride flush 0.9 % injection 10 mL, 10 mL, Intravenous, PRN, Belkis Stafford MD, 10 mL at 03/20/20 1726    acetaminophen (TYLENOL) tablet 650 mg, 650 mg, Oral, Q6H PRN, 650 mg at 03/25/20 0449 **OR** acetaminophen (TYLENOL) suppository 650 mg, 650 mg, Rectal, Q6H PRN, Belkis Stafford MD    polyethylene glycol (GLYCOLAX) packet 17 g, 17 g, Oral, Daily PRN, Belkis Stafford MD    promethazine (PHENERGAN) tablet 12.5 mg, 12.5 mg, Oral, Q6H PRN **OR** ondansetron (ZOFRAN) injection 4 mg, 4 mg, Intravenous, Q6H PRN, Belkis Stafford MD, 4 mg at 03/25/20 0838    enoxaparin (LOVENOX) injection 40 mg, 40 mg, Subcutaneous, Daily, Belkis Stafford MD, 40 mg at 03/25/20 0831    ipratropium (ATROVENT) 0.02 % nebulizer solution 0.5 mg, 0.5 mg, Nebulization, Q4H WA, Sonya Ward MD, 0.5 mg at 03/25/20 0912    piperacillin-tazobactam (ZOSYN) 3.375 g in dextrose 5 % 100 mL IVPB extended infusion (mini-bag), 3.375 g, Intravenous, Q8H, Stopped at 03/25/20 0821 **AND** 0.9 % sodium chloride infusion admixture, , Intravenous, Q8H, Sonya Ward MD, Stopped at 03/24/20 2031    [COMPLETED] vancomycin 1.5 g in dextrose 5% 300 mL IVPB, 1,500 mg, Intravenous, Once, Stopped at 03/20/20 2117 **FOLLOWED BY** vancomycin (VANCOCIN) 750 mg in dextrose 5 % 250 mL IVPB, 750 mg, Intravenous, Q12H, Sonya Ward MD, Stopped at 03/25/20 0715    0.9 % sodium chloride infusion, , Intravenous, Continuous, Tim Patel MD, Last Rate: 50 mL/hr at 03/24/20 1302    Assessment:    Active Problems:    UTI (urinary tract infection)    Cancer related pain  Resolved Problems:    * No resolved hospital problems.  *    73 yo gentleman known to Dr. Jessica Corea with hx of stage IV prostate cancer involving the bone, currently stable on Xtandi since April 2019, completed palliative radiation to the lumbar sacral spine April 2019.  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  Treated with Carbo and -16 from May 20, 2019 to Sept 18, 2019 and had almost CR on PET scan July 2019.  Had recurrent disease in the liver Feb 2020. Started Keytruda Feb 13, 2020 and has had 2 cycles so far, last on 3/16/2020.  He was recently at [facility] for urosepsis earlier in March. 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  -Admitted from SNF with increased SOB, cough, fever 101.5 along with abdominal pain.  CT abd/pelvis diffuse metastatic malignancy with multiple hepatic lesions.  Large necrotic mass in left kidney with moderately enlarged retroperitoneal and inguinal LAD.  Large lobulated rectal mass.  Also enlarged prostate gland with

## 2020-03-25 NOTE — PROGRESS NOTES
[address] [place] [address] [facility]  DON  Progress Note      Chief Complaint   Patient presents with    Fever     fevers and fatigue x 1 week per [facility] temp 101.5 Hx Liver CA    Fatigue       SUBJECTIVE:      Awake and alert. Complaining of diarrhea. Still feels very weak. No problems overnight. Denies chest pain or dyspnea. Denies abdominal pain. Tolerating diet. No nausea, vomiting or diarrhea. Review of systems:    As stated above in the chief complaint, otherwise negative. Medications:    Scheduled Meds:   lidocaine  1 patch Transdermal Daily    furosemide  20 mg Intravenous Once    metoprolol tartrate  25 mg Oral BID    lidocaine  5 mL Intradermal Once    heparin flush  1 mL Intravenous 2 times per day    cefepime  2 g Intravenous Q12H    fentaNYL  1 patch Transdermal Q72H    And    fentaNYL  1 patch Transdermal Q72H    potassium chloride  20 mEq Oral BID WC    amiodarone  200 mg Oral Daily    calcium elemental  1 tablet Oral BID    enzalutamide  160 mg Oral Dinner    montelukast  10 mg Oral Nightly    vitamin C  500 mg Oral Daily    sodium chloride flush  10 mL Intravenous 2 times per day    enoxaparin  40 mg Subcutaneous Daily    ipratropium  0.5 mg Nebulization Q4H WA     Continuous Infusions:  PRN Meds:sodium chloride flush, heparin flush, oxybutynin, sodium chloride flush, acetaminophen **OR** acetaminophen, polyethylene glycol, promethazine **OR** ondansetron  Prior to Admission medications    Medication Sig Start Date End Date Taking? Authorizing Provider   fentaNYL (DURAGESIC) 12 MCG/HR Place 1 patch onto the skin every 72 hours for 30 days. 3/27/20 4/26/20 Yes Kilo Trujillo APRN - CNP   fentaNYL (DURAGESIC) 25 MCG/HR Place 1 patch onto the skin every 72 hours for 30 days.  3/27/20 4/26/20 Yes Kilo Trujillo APRN - CNP   mirtazapine (REMERON) 15 MG tablet Take 15 mg by mouth nightly   Yes Historical Provider, MD   amLODIPine (NORVASC) 5 MG tablet Take 5 mg by mouth daily   Yes Historical Provider, MD   Omega-3 Fatty Acids (FISH OIL) 1000 MG CAPS Take 3,000 mg by mouth daily   Yes Historical Provider, MD   calcium carbonate 600 MG TABS tablet Take 1 tablet by mouth 2 times daily   Yes Historical Provider, MD   doxycycline hyclate (VIBRA-TABS) 100 MG tablet Take 100 mg by mouth 2 times daily 3/19/20 3/28/20 Yes Historical Provider, MD   acetaminophen (TYLENOL) 325 MG tablet Take 650 mg by mouth every 4 hours as needed for Pain   Yes Historical Provider, MD   vitamin C (ASCORBIC ACID) 500 MG tablet Take 500 mg by mouth daily    Yes Historical Provider, MD   Cholecalciferol (VITAMIN D3) 75 MCG (3000 UT) TABS Take 3,000 Units by mouth 2 times daily    Yes Historical Provider, MD   metoprolol (LOPRESSOR) 100 MG tablet Take 100 mg by mouth 2 times daily    Yes Historical Provider, MD   amiodarone (CORDARONE) 200 MG tablet Take 200 mg by mouth daily   Yes Historical Provider, MD   ondansetron (ZOFRAN) 4 MG tablet Take 4 mg by mouth daily as needed for Nausea or Vomiting    Yes Historical Provider, MD   oxybutynin (DITROPAN-XL) 10 MG extended release tablet Take 10 mg by mouth daily as needed (Bladder Spasms)   Yes Historical Provider, MD   tamsulosin (FLOMAX) 0.4 MG capsule Take 0.8 mg by mouth nightly    Yes Historical Provider, MD   enzalutamide (XTANDI) 40 MG capsule Take 160 mg by mouth Daily with supper    Yes Historical Provider, MD   montelukast (SINGULAIR) 10 MG tablet Take 10 mg by mouth nightly   Yes Historical Provider, MD       OBJECTIVE:  BP (!) 158/104   Pulse 95   Temp 98.3 °F (36.8 °C) (Temporal)   Resp 20   Ht 5' 10\" (1.778 m)   Wt 172 lb (78 kg)   SpO2 95%   BMI 24.68 kg/m²   Temp  Av.7 °F (36.5 °C)  Min: 96.8 °F (36 °C)  Max: 98.3 °F (36.8 °C)  Constitutional: The patient is awake, alert, and oriented. Skin: Warm and dry. No rashes were noted. HEENT: Round and reactive pupils. Moist mucous membranes. No ulcerations or thrush.   Neck: Supple to movements. Chest: No use of accessory muscles to breathe. Symmetrical expansion. No wheezing, crackles or rhonchi. Cardiovascular: Reguar rate and rhythm. No murmurs gallops, or rubs appreciated. Abdomen: Bowel sounds present, nontender, nondistended, no masses or hepatosplenomegaly. Extremities: No clubbing, no cyanosis, no edema. Lines: peripheral    I/O last 3 completed shifts: In: 1991 [P.O.:600; I.V.:1391]  Out: 975 [Urine:975]      Laboratory and Tests Review:      Lab Results   Component Value Date    WBC 8.6 03/24/2020    WBC 9.6 03/21/2020    WBC 13.0 (H) 03/20/2020    HGB 11.0 (L) 03/24/2020    HCT 35.2 (L) 03/24/2020    MCV 90.7 03/24/2020     03/24/2020     Lab Results   Component Value Date    NEUTROABS 8.70 (H) 03/21/2020    NEUTROABS 11.63 (H) 03/20/2020    NEUTROABS 5.54 10/30/2019     No results found for: CRP  Lab Results   Component Value Date    ALT 10 03/24/2020    AST 17 03/24/2020    ALKPHOS 65 03/24/2020    BILITOT 0.5 03/24/2020     Lab Results   Component Value Date     03/24/2020    K 3.6 03/24/2020    K 3.2 03/21/2020     03/24/2020    CO2 19 03/24/2020    BUN 14 03/24/2020    CREATININE 0.9 03/24/2020    CREATININE 1.2 03/21/2020    CREATININE 1.3 03/20/2020    GFRAA >60 03/24/2020    LABGLOM >60 03/24/2020    GLUCOSE 152 03/24/2020    PROT 5.5 03/24/2020    LABALBU 2.7 03/24/2020    CALCIUM 7.4 03/24/2020    BILITOT 0.5 03/24/2020    ALKPHOS 65 03/24/2020    AST 17 03/24/2020    ALT 10 03/24/2020     No results found for: CRP  No results found for: [address]    Radiology:    XR CHEST PORTABLE   Final Result      Moderate right-sided pleural effusion small left-sided pleural   effusion      No evidence of airspace consolidation or pulmonary venous congestion         CT HEAD WO CONTRAST   Final Result      NO ACUTE INTRACRANIAL PROCESS         US THORACENTESIS   Final Result   Ultrasound guidance for right thoracentesis.                CT ABDOMEN PELVIS W IV CONTRAST Additional Contrast? None   Final Result   Diffuse metastatic malignancy in the abdomen and pelvis with the   multiple hypodense infiltrative hepatic lesions with the rim of soft   tissue mass surrounding the liver concerning for diffuse hepatic   malignancy. Large necrotic mass in the left kidney concerning for renal cell   carcinoma with the moderately enlarged retroperitoneal and inguinal   lymphadenopathy. A large lobulated rectal mass concerning for malignancy. There is also   enlarged prostate gland concerning for malignancy with possible   malignancy involving the urinary bladder which is  thickened with   hydronephrosis. Infiltrates and pleural effusion right lung base with a masslike   density in the right lung base which may represent focal consolidation   versus malignancy. Consider PET CT scan. Extensive skeletal metastasis. ALERT:  THIS IS AN ABNORMAL REPORT      XR CHEST PORTABLE   Final Result   Patchy and masslike infiltrates and pleural effusions in the right   lung base concerning for complicated pneumonia. Underlying malignancy   is not excluded. Surveillance preferably by CT scan is recommended.           Microbiology:   Lab Results   Component Value Date    BC 24 Hours- no growth 03/20/2020    BC 24 Hours- no growth 03/20/2020    ORG Pseudomonas aeruginosa 03/20/2020    ORG GNR oxidase positive 03/19/2020    ORG Pseudomonas aeruginosa 10/30/2019     Lab Results   Component Value Date    BLOODCULT2 24 Hours- no growth 03/21/2020    BLOODCULT2 24 Hours- no growth 03/20/2020    ORG Pseudomonas aeruginosa 03/20/2020    ORG GNR oxidase positive 03/19/2020    ORG Pseudomonas aeruginosa 10/30/2019     No results found for: WNDABS  No results found for: RESPSMEAR  No results found for: MPNEUMO, CLAMYDCU, LABLEGI, AFBCX, FUNGSM, LABFUNG  No results found for: CULTRESP  No results found for: CXCATHTIP  Body Fluid Culture, Sterile   Date Value Ref Range Status 03/21/2020 Growth not present  Final     No results found for: CXSURG  Urine Culture, Routine   Date Value Ref Range Status   03/20/2020 >100,000 CFU/ml  Final   03/19/2020 >100,000 CFU/ml  Final   10/30/2019 >100,000 CFU/ml  Final     No results found for: [facility]    Problem list:    Active Problems:    UTI (urinary tract infection)    Cancer related pain  Resolved Problems:    * No resolved hospital problems. *      ASSESSMENT:    Complicated urinary tract infection, catheter related, pseudomonas aeruginosa    Sepsis due to urinary source, present on admission    Pleural effusion    History of hepatic carcinoma    Diffuse pelvic and abdominal metastatic disease    PLAN:    Continue intravenous antibiotics, switch to cefepime and place midline for home IV antibiotics-cannot discharge on Levaquin with patient on amiodarone due to possible QT prolongation    Diarrhea noted, stool sent for C. difficile    Await results of C. difficile testing prior to discharge    Case discussed with PCP and Dr. Addi Rene.Ashlyn Right  12:59 PM  3/25/2020  Pt seen and examined. Above discussed agree . Labs, cultures, and radiographs reviewed. Face to Face encounter occurred. Changes made as necessary.      Tod Clark MD

## 2020-03-25 NOTE — CARE COORDINATION
received a call from the patients girlfriend and now the patient wants to take the patient home and I have explained to her that he is not moving well and is very week  and continues to refuse therapy due to being in so much pain. She wants [facility] ref made home care orders are in. I spoke to the patient in the room and he felt he should return to [facility] so now plan is to return to [facility]. Received another call from the girlfriend that she spoke to the patient and that she had everything all set up at home and the patient now has decided to return home .

## 2020-03-25 NOTE — PROGRESS NOTES
Physical Therapy  Treatment Note  Name: Michaela Moore  : 1945  MRN: [id number]    Referring Provider: Cathie Connors MD    Date of Service: 3/25/2020    Evaluating PT: Kaylan Trinh, PT, DPT, [id number]    Room #: 1074/3437-U  Diagnosis: UTI  PMHx/PSHx: OA, HTN, prostate CA, asthma  Procedures/Surgeries: Thoracentesis (3/21)  Precautions: Fall risk, incontinent of feces, yanez, alarm    SUBJECTIVE:    Pt is questionable historian. Per pt, pt is from [place] [address]. Pt lives with significant other in a single story house with 4 stair(s) and 1 rail(s) to enter. Bed is on first floor and bath is on first floor. Pt ambulated with [employer] for short distances with staff assistance at [place] [address] prior to admission. Pt ambulated without AD prior to SWETHA. OBJECTIVE:   Initial Evaluation  Date: 3/23/20 Treatment Date: 3/25/20  Short Term/ Long Term   Goals   AM-PAC 6 Clicks 10/72 1/96    Was pt agreeable to Eval/treatment? Yes yes    Does pt have pain? 6-8/10 lower back \"ache\" 6/10 low back    Bed Mobility  Rolling: Min A  Supine to sit: Max A  Sit to supine: Max A  Scooting: Max A toward EOB Rolling ModA  Supine to sit MaxA  Sit to supine MaxA  Scooting MaxA Rolling: SBA  Supine to sit: SBA  Sit to supine: SBA  Scooting: SBA   Transfers Sit to stand: Max A  Stand to sit:  Max A  Stand pivot: NT N/T see notes Sit to stand: Min A  Stand to sit: Min A  Stand pivot: Min A with [employer]   Ambulation   NT N/T >25 feet with [employer] with Mod A   Stair negotiation: ascended and descended NT N/T 4 step(s) with 1 rail(s) with Mod A   ROM BUE: Refer to OT note  BLE: WFL     Strength BUE: Refer to OT note  BLE: NT     Balance Sitting EOB: SBA to Mod A  Dynamic Standing: NT Sitting EOB ModA Sitting EOB: Supervision  Dynamic Standing: Min A with [employer]     Pt is A & O x 4  Sensation:  Pt denies numbness and tingling to extremities  Edema:  None    Therapeutic Exercises:  None    Patient education  Pt educated on safety awareness, energy conservation, proper hand placement and sequencing for bed mobility and transfers, PLB throughout activity to help with regulate HR and improve O2 Saturation. Patient response to education:   Pt verbalized understanding Pt demonstrated skill Pt requires further education in this area   y partial y     ASSESSMENT:    Patient exhibits decreased strength, balance, coordination impairing functional mobility. Educated pt on techniques to improve safety and independence with bed mobility, transfers, balance, functional transfers, and functional mobility. Pt sat EOB for 15 minutes with SBA-Mattie in order to improve static and dynamic sitting balance and activity tolerance. Upon entry pt was incontinent of the bowel and required Mod-MaxA for rolling to each side and OT assisted with self-care. Pt then c/o 8/10 dizziness when sitting EOB that subsided to a \"moderate\" amount when sitting EOB. Pt declined standing d/t urge to move bowels and was returned to bed and given the bed pan. Pt then c/o low back pain when on the bed pan and required Max/Dep x 2 for repositioning toward HOB. Pt demonstrated fair+ understanding of education/techniques requiring additional education/training as well as fair+ safety awareness. At end of session, pt was left in bed with bed alarm on with call light in reach and all needs met. Pt would benefit from continued skilled PT services to increase functional independence and overall quality of life. Treatment:    Pt given VC for hand placement and sequencing to address deficits for bed mobility, transfers, ambulation. Pt given VC for safety throughout session to reduce risk of falls. Pt was given instruction for sit >< stand transfers regarding weight shifting, sequencing, and proper foot/hand placement to achieve an effective stand but pt declined d/t feeling of having to move his bowels. Pt's/ family goals   To improve strength, endurance, balance, improve breathing, decrease pain.     Patient and or family understand(s) diagnosis, prognosis, and plan of care. PLAN:    PT care will be provided in accordance with the objectives noted above. Exercises and functional mobility practice will be used as well as appropriate assistive devices or modalities to obtain goals. Patient and family education will also be administered as needed. Frequency of treatments: 1x daily for 3-7x per week for 3-5 days.     Time in  1040  Time out  1104    (Evaluation time includes thorough review of current medical information, gathering information on past medical history/social history and prior level of function, completion of standardized testing/informal observation of tasks, assessment of data, and development of POC/Goals.)    Total Treatment Time  24 minutes    CPT codes:  [] PT Re-evaluation 53240  [] Gait training 38962   [] Manual therapy 39886   [x] Therapeutic activities 95895     24 minutes  [] Therapeutic exercises 77480      [] Neuromuscular reeducation [facility], PT, DPT   [id number]

## 2020-03-25 NOTE — PROGRESS NOTES
Notified Dr. Madelyn Mancera. [place] [facility] of patient's updated BP of 158/104 and that patient did have 3 episodes of diarrhea.

## 2020-03-25 NOTE — PROGRESS NOTES
OT BEDSIDE TREATMENT NOTE      Date:3/25/2020  Patient Name: Kayce Gaona  MRN: [id number]  : 1945  Room: [address] [place] [address]     Per OT Eval:    Evaluating [facility], OTR/L #[id number]     AM-PAC Daily Activity Raw Score:   Recommended Adaptive Equipment: TBD      Diagnosis: UTI (urinary tract infection) [N39.0]     Referring physician: Rodrigo Bhatti MD  Procedures: thoracentesis on 3/21/20  Pertinent Medical History: arthritis, asthma, GIB, HTN, A-flutter, prostate CA w/ mets     Precautions:  Falls, bed alarm, incontinent     Home Living: Pt admitted from [address] [place] [address] Road owned: w/w     Prior Level of Function: assistance with ADLs; ambulated short distances w/ w/w and staff assistance  Per pt, ate most meals in room - not in dining room  Driving: no     Pain Level: Pt did not complain of pain this session     Cognition: A&O: 3/4; Follows 1 step directions              Memory:  fair               Sequencing:  fair               Problem solving:  fair -              Judgement/safety:  fair -                Functional Assessment:    Initial Eval Status  Date: 3/23/20 Treatment Status  Date: 3/25/20 Short Term Goals/LTG  Treatment frequency: 1-4x/wk   Feeding Supervision   Supervision Modified Jay    Grooming Minimal Assist   seated Min A (seated EOB to complete facial washing) Modified Jay    UB Dressing Moderate Assist   maxA  [facility] gown seated EOB Stand by Assist    LB Dressing Dependent   Dependent (assist with socks, deferred edu on AE due to decreased sitting tolerance) Moderate Assist    Bathing Maximal Assist  Mod A (simulated) Minimal Assist    Toileting Dependent   Including hygiene  Incontinent of stool, yanez  Dependent (incotnient initially, assist with hema hygiene, assist with bed pan at end of session) Moderate Assist    Bed Mobility  Rolling:  Moderate Assist   Supine to sit: Maximal Assist   Sit to supine: Maximal Assist   maxA  Supine<>EOB  EOB<>Supine Rolling: Minimal Assist   Supine to sit: Minimal Assist   Sit to supine: Minimal Assist    Functional Transfers Max A   Sit><stand multiple times from EOB  NT (pt required toileting needs) Min A   Functional Mobility NT  Unable to complete d/t decreased activity tolerance and bowel incontinence  NT Mod A   Balance Sitting: Mod><SBA  (posterior lateral lean noted)  Standing: Max A w/ w/w Sitting EOB:   modA  Posterior lean  Poor Balance    Standing:  NT     Activity Tolerance Fair-  Poor+ (vc's for for sequencing all task, decreased carry-over/understadning of commands, sat EOB <10 mins)   Fair+   Visual/  Perceptual Glasses: yes                       Hand dominance: R    Strength ROM Additional Info:    RUE  3+/5  WFL    good  and wfl FMC/dexterity noted during ADL tasks         LUE 3+/5  WFL    good  and wfl FMC/dexterity noted during ADL tasks         Hearing: WFL  Sensation: c/o numbness/tingling B toes  Tone: WFL                   Comments: Upon arrival pt lying supine in bed, agreeable to therapy. Completed ADLs/bed mobility, see above for assessment. Pt required frequent vc's for all task and demonstrates decreased activity tolerance/sitting balance. At end of session, pt lying supine in bed, all lines and tubes intact, call light within reach. · Pt has made slow progress towards set goals.    · Continue with current plan of care      Treatment Time In: 10:40          Treatment Time Out: 11:00             Treatment Charges: Mins Units   Ther Ex  53604     Manual Therapy 74679     Thera Activities 49702     ADL/Home Mgt 84420 20 1   Neuro Re-ed 12460     Group Therapy      Orthotic manage/training  [id number]     Non-Billable Time     Total Timed Treatment 20 7[address] [facility], [address] [place] [address], OTR/L [id number]

## 2020-03-26 NOTE — DISCHARGE SUMMARY
Discharge Summary    Alisha Soto  :  1945  MRN:  [id number]    Admit date:  3/20/2020  Discharge date:  3/26/2020 6:05 PM    Admitting Physician:  Emanuel Mahoney MD    Discharge Diagnoses:    Patient Active Problem List    Diagnosis Date Noted    Cancer related pain     UTI (urinary tract infection) 2020    Anemia     Gastroesophageal reflux disease     Hepatic lesion 2019    Hypokalemia 2019    Moderate protein-calorie malnutrition ([address] 2019    GI bleed 2019    Paroxysmal atrial flutter ([address] 2019    Primary prostate cancer with metastasis from prostate to other site [facility])     Hypertension     GIB (gastrointestinal bleeding)     Asthma     Dog bite of right thigh 2017       Past Medical Hx :   Past Medical History:   Diagnosis Date    Arthritis     Asthma     GIB (gastrointestinal bleeding)     History of blood transfusion     Hypertension     Paroxysmal atrial flutter ([facility] 2019    Primary prostate cancer with metastasis from prostate to other site [facility]) 2018    already had metastasized.      Sleep apnea        Past Surgical Hx :   Past Surgical History:   Procedure Laterality Date    CHOLECYSTECTOMY      COLON SURGERY      scar tissue removed     COLONOSCOPY N/A 2019    COLONOSCOPY DIAGNOSTIC performed by Je Willett MD at [address] COLONOSCOPY N/A 2019    COLONOSCOPY CONTROL HEMORRHAGE performed by Juli Esteves DO at [address] [facility]  2/3/2020    COLONOSCOPY CONTROL HEMORRHAGE performed by Juli Esteves DO at [address] N/A 2019    FLEXIBLE SIGMOIDOSCOPY, CONTROL HEMORRHAGE performed by Juli Esteves DO at [facility] 2/3/2020    [address] (CPT [id number]) performed by Juli Esteves DO at [facility].  2019    SINUS SURGERY Pelvis W Iv Contrast Additional Contrast? None    Result Date: 3/20/2020  Patient MRN:  [id number] : 1945 Age: 76 years Gender: Male Order Date:  3/20/2020 11:45 AM EXAM: CT ABDOMEN PELVIS W IV CONTRAST Number of images 355. Contrast. Isovue-370, 110 mL intravenously. Technique: Low-dose CT  acquisition technique included one of following options; 1 . Automated exposure control, 2. Adjustment of MA and or KV according to patient's size or 3. Use of iterative reconstruction. INDICATION:  ab pain. hx of liver cancer. ab pain. hx of liver cancer. COMPARISON: Previous CT scan 2019 and chest radiograph 3/20/2020 FINDINGS: The lung bases demonstrate cardiomegaly with coronary artery calcification. There is atelectasis and large pleural effusion in the right lung base. There is a focal masslike infiltrative density in the right perihilar region measuring 4.6 x 3.3 cm which may represent a malignant mass versus focal consolidation. Note is made of a large infiltrative mass involving the posterior right hepatic lobe measuring 6 x 10 cm. Additional multiple hypodense lesions are identified in the left and right hepatic lobe measuring up to 4.5 x 2.2 cm with a rim of irregular thick soft tissue density surrounding the right hepatic lobe laterally. Gallbladder is absent. Spleen, pancreas, and adrenals are normal. A previous noted heterogeneous mass involving the superior pole of the left kidney currently measures 6.2 x 6.3 cm concerning for malignancy. Multiple additional cystic lesions are identified in the kidneys with the largest one in the right kidney measuring 6 x 7 cm. There is mild hydronephrosis. Moderately enlarged retroperitoneal lymphadenopathy are noted with the lymph nodes measuring up to 1.3 cm. Pelvis.  The prostate gland is prominent and heterogeneous measuring 5.7 x 5.5 cm with  mass effect on the urinary bladder which is partially collapsed with a Milner catheter with  diffuse mural thickening. There is a large soft tissue mass involving the rectum extending to the right ischiorectal fossa measuring 6 x 6.5 cm. Moderately enlarged lymph nodes are identified in the inguinal region with lymph nodes measuring up to 1.6 cm. Note is made of extensive sclerotic lesions in the lumbosacral spine with involvement of the iliac bones concerning for bone metastasis. Diffuse metastatic malignancy in the abdomen and pelvis with the multiple hypodense infiltrative hepatic lesions with the rim of soft tissue mass surrounding the liver concerning for diffuse hepatic malignancy. Large necrotic mass in the left kidney concerning for renal cell carcinoma with the moderately enlarged retroperitoneal and inguinal lymphadenopathy. A large lobulated rectal mass concerning for malignancy. There is also enlarged prostate gland concerning for malignancy with possible malignancy involving the urinary bladder which is  thickened with hydronephrosis. Infiltrates and pleural effusion right lung base with a masslike density in the right lung base which may represent focal consolidation versus malignancy. Consider PET CT scan. Extensive skeletal metastasis. ALERT:  THIS IS AN ABNORMAL REPORT    Xr Chest Portable    Result Date: 3/21/2020  Patient MRN: [id number] : 1945 Age:  76 years Gender: Male Order Date: 3/21/2020 12:15 PM Exam: XR CHEST PORTABLE Number of Images: 1 view Indication:   right thoracentesis right thoracentesis Comparison: Prior study from 2020 is available Findings: The lungs demonstrate moderate right-sided pleural effusion. There is minimal left-sided pleural effusion seen. The remaining lung fields are clear. .  The pulmonary vascularity is unremarkable. The cardiac, hilar and mediastinal silhouettes are satisfactory. There is uncoiling atherosclerotic change of thoracic aorta. The bony thorax demonstrates no gross abnormality.      Moderate right-sided pleural effusion small left-sided pleural effusion No evidence of airspace consolidation or pulmonary venous congestion     Xr Chest Portable    Result Date: 3/20/2020  Patient MRN:  [id number] : 1945 Age: 76 years Gender: Male Order Date:  3/20/2020 11:45 AM EXAM: XR CHEST PORTABLE one image INDICATION:  possible sepsis possible sepsis COMPARISON: 2019 FINDINGS: There is borderline cardiac size. There is patchy and masslike infiltrates in the right perihilar region and right lung base with a large right pleural effusion. A  small left pleural effusion is present. Patchy and masslike infiltrates and pleural effusions in the right lung base concerning for complicated pneumonia. Underlying malignancy is not excluded. Surveillance preferably by CT scan is recommended. Us Thoracentesis    Result Date: 3/21/2020  Patient MRN:  [id number] : 1945 Age: 76 years Gender: Male Order Date:  3/21/2020 12:15 PM EXAM: US THORACENTESIS NUMBER OF IMAGES:    4 INDICATION:  bedside thoracentesis - right bedside thoracentesis - right please schedule for 11:30 if available if not call me at [phone] COMPARISON: 2020 TECHNIQUE: Neeru Birkenhead scale as well as color and spectral duplex sonography of the right chest wall was performed. FINDINGS:        Ultrasound guidance was provided for thoracentesis. Right pleural effusion again noted. Ultrasound guidance for right thoracentesis. 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 No events overnight as per nurse   At this time the staff is notifying me of a couple of issues   -He has been having diarrhea however infectious disease is following him   -His blood pressure has been elevated   Reviewed consultants notes after the past 24 hours   Oncology will monitor the CBC. Note that the cytology from thoracentesis is negative   Infectious disease treating for catheter related UTI with Pseudomonas Zosyn sensitive   Vancomycin was DC'd Levaquin p.o. 500 mg for 5 more days while monitoring QTC   Infectious disease was okay for discharge   Pulmonary medicine has okayed discharge as well   harge Medications:    eVnkat Guadalupe   Home Medication Instructions P:[id number]    Printed on:03/26/20 1861   Medication Information                      acetaminophen (TYLENOL) 325 MG tablet  Take 650 mg by mouth every 4 hours as needed for Pain             amiodarone (CORDARONE) 200 MG tablet  Take 200 mg by mouth daily             amLODIPine (NORVASC) 5 MG tablet  Take 5 mg by mouth daily             calcium carbonate 600 MG TABS tablet  Take 1 tablet by mouth 2 times daily             cefepime (MAXIPIME) infusion  Infuse 2,000 mg intravenously every 12 hours for 4 days Compound per protocol             Cholecalciferol (VITAMIN D3) 75 MCG (3000 UT) TABS  Take 3,000 Units by mouth 2 times daily              enzalutamide (XTANDI) 40 MG capsule  Take 160 mg by mouth Daily with supper              fentaNYL (DURAGESIC) 12 MCG/HR  Place 1 patch onto the skin every 72 hours for 30 days. fentaNYL (DURAGESIC) 25 MCG/HR  Place 1 patch onto the skin every 72 hours for 30 days.              lidocaine 4 % external patch  Place 1 patch onto the skin daily             metoprolol (LOPRESSOR) 100 MG tablet  Take 100 mg by mouth 2 times daily              mirtazapine (REMERON) 15 MG tablet  Take 15 mg by mouth nightly             montelukast (SINGULAIR) 10 MG tablet  Take 10 mg by mouth nightly             ondansetron (ZOFRAN) 4 MG tablet  Take 4 mg by mouth daily as needed for Nausea or Vomiting              oxybutynin (DITROPAN-XL) 10 MG extended release tablet  Take 10 mg by mouth daily as needed (Bladder Spasms)             polyethylene glycol (GLYCOLAX) packet  Take 17 g by mouth daily as needed for Constipation             potassium chloride (KLOR-CON M) 20 MEQ extended release tablet  Take 1 tablet by mouth 2 times daily (with meals)                 Discharge Exam:  BP (!) 171/107  Pulse 106  Temp 96.9 °F (36.1 °C)  Resp 20  Ht 5' 10\" (1.778 m)  Wt 172 lb (78 kg)  SpO2 95%  BMI 24.68 kg/m²   General appearance: No apparent distress, appears stated age and cooperative. HEENT: Pupils equal, round, and reactive to light. Conjunctivae/corneas clear. Neck: Supple. No jugular venous distention. Trachea midline. Respiratory: Normal respiratory effort. Clear to auscultation, bilaterally   Cardiovascular: S1/S2-there is an apical blowing murmur 2/6   Abdomen: Soft, non-tender, non-distended with normal bowel sounds. Musculoskeletal: No clubbing, cyanosis or edema bilaterally. Brisk capillary refill. 2+ lower extremity pulses (dorsalis pedis).    Skin: No rashes   Neurologic: awake, alert and following commands         Disposition: home with [facility]  IV antibiotics    Patient Instructions:   REFER TO AVR or AJAY document    Signed:  Sierra Cook of Internal Medicine  [employer] Ger[employer]  3/26/2020, 6:05 PM

## 2020-03-26 NOTE — PROGRESS NOTES
midline Placement 3/26/2020    Product number: [id number]   Lot Number: [id number]      Ultrasound: yes   \"R Brachial\",.       Upper Arm Circumference: 27cm    Size: 4.5cm    Exposed Length: 3cm    Internal Length: 12cm   Cut: 0cm   Vein Measurement: 0.45cm    Judi Drivers  3/26/2020  12:39 PM

## 2020-03-26 NOTE — PROGRESS NOTES
Subjective: The patient is awake and alert in bed. Having some abdominal discomfort. Says the lidocain patch did help relieve some of his pain. No problems overnight. Denies chest pain, angina, dyspnea or abdominal discomfort. No nausea or vomiting. Tolerating diet. Objective:    BP (!) 171/107   Pulse 106   Temp 96.9 °F (36.1 °C)   Resp 20   Ht 5' 10\" (1.778 m)   Wt 172 lb (78 kg)   SpO2 95%   BMI 24.68 kg/m²     General: NAD  HEENT: No thrush or mucositis, EOMI, PERRLA  Heart:  RRR, no murmurs, gallops, or rubs.   Lungs:  CTA bilaterally, no wheeze, rales or rhonchi  Abd: bowel sounds present, nontender, nondistended, no masses  Extrem:  No clubbing, cyanosis, or edema  Lymphatics: No palpable adenopathy in cervical and supraclavicular regions  Skin: Intact, no petechia or purpura    CBC with Differential:    Lab Results   Component Value Date    WBC 8.6 03/24/2020    RBC 3.88 03/24/2020    HGB 11.0 03/24/2020    HCT 35.2 03/24/2020     03/24/2020    MCV 90.7 03/24/2020    MCH 28.4 03/24/2020    MCHC 31.3 03/24/2020    RDW 17.1 03/24/2020    LYMPHOPCT 2.2 03/21/2020    MONOPCT 6.4 03/21/2020    MYELOPCT 1.7 04/21/2019    BASOPCT 0.1 03/21/2020    MONOSABS 0.61 03/21/2020    LYMPHSABS 0.21 03/21/2020    EOSABS 0.00 03/21/2020    BASOSABS 0.01 03/21/2020     CMP:    Lab Results   Component Value Date     03/24/2020    K 3.6 03/24/2020    K 3.2 03/21/2020     03/24/2020    CO2 19 03/24/2020    BUN 14 03/24/2020    CREATININE 0.9 03/24/2020    GFRAA >60 03/24/2020    LABGLOM >60 03/24/2020    GLUCOSE 152 03/24/2020    PROT 5.5 03/24/2020    LABALBU 2.7 03/24/2020    CALCIUM 7.4 03/24/2020    BILITOT 0.5 03/24/2020    ALKPHOS 65 03/24/2020    AST 17 03/24/2020    ALT 10 03/24/2020          Current Facility-Administered Medications:     metoprolol tartrate (LOPRESSOR) tablet 50 mg, 50 mg, Oral, BID, Doris Lopez MD    amLODIPine (NORVASC) tablet 5 mg, 5 mg, Oral, Daily, Allen Olmedo MD    lidocaine 4 % external patch 1 patch, 1 patch, Transdermal, Daily, HAMLET Juarez CNP, 1 patch at 03/26/20 0854    lidocaine 1 % injection 5 mL, 5 mL, Intradermal, Once, Bertha Knight DO    sodium chloride flush 0.9 % injection 10 mL, 10 mL, Intravenous, PRN, Bertha Knight DO    heparin flush 100 UNIT/ML injection 100 Units, 1 mL, Intravenous, 2 times per day, Bertha Knight DO    heparin flush 100 UNIT/ML injection 100 Units, 1 mL, Intracatheter, PRN, Bertha Knight DO    cefepime (MAXIPIME) 2 g IVPB extended (mini-bag), 2 g, Intravenous, Q12H, Bertha Knight DO, Stopped at 03/26/20 0729    0.9 % sodium chloride infusion admixture, , Intravenous, Q12H, Bertha Knight DO, Stopped at 03/26/20 0855    fentaNYL (DURAGESIC) 12 MCG/HR 1 patch, 1 patch, Transdermal, Q72H, 1 patch at 03/24/20 1708 **AND** fentaNYL (DURAGESIC) 25 MCG/HR 1 patch, 1 patch, Transdermal, Q72H, HAMLET Juarez CNP, 1 patch at 03/24/20 1710    potassium chloride (KLOR-CON M) extended release tablet 20 mEq, 20 mEq, Oral, BID WC, Hayley Gold MD, 20 mEq at 03/26/20 6350    amiodarone (CORDARONE) tablet 200 mg, 200 mg, Oral, Daily, Hayley Gold MD, 200 mg at 03/26/20 0855    calcium elemental (OSCAL) tablet 500 mg, 1 tablet, Oral, BID, Hayley Gold MD, 500 mg at 03/26/20 2869    enzalutamide (XTANDI) capsule 160 mg, 160 mg, Oral, Dinner, Hayley Gold MD, 160 mg at 03/25/20 1733    montelukast (SINGULAIR) tablet 10 mg, 10 mg, Oral, Nightly, Hayley Gold MD, 10 mg at 03/25/20 1949    oxybutynin (DITROPAN-XL) extended release tablet 10 mg, 10 mg, Oral, Daily PRN, Hayley Gold MD    vitamin C (ASCORBIC ACID) tablet 500 mg, 500 mg, Oral, Daily, Scarlet Hoover MD, 500 mg at 03/26/20 0855    sodium chloride flush 0.9 % injection 10 mL, 10 mL, Intravenous, 2 times per day, Hayley Gold MD, 10 mL at 03/26/20 0856    sodium chloride flush 0.9 % injection 10 mL, 10 mL, Intravenous, PRN, Michelle Luo MD, 10 mL at 03/20/20 1726    acetaminophen (TYLENOL) tablet 650 mg, 650 mg, Oral, Q6H PRN, 650 mg at 03/26/20 0023 **OR** acetaminophen (TYLENOL) suppository 650 mg, 650 mg, Rectal, Q6H PRN, Michelle Luo MD    polyethylene glycol (GLYCOLAX) packet 17 g, 17 g, Oral, Daily PRN, Michelle Luo MD    promethazine (PHENERGAN) tablet 12.5 mg, 12.5 mg, Oral, Q6H PRN **OR** ondansetron (ZOFRAN) injection 4 mg, 4 mg, Intravenous, Q6H PRN, Michelle Luo MD, 4 mg at 03/26/20 0901    enoxaparin (LOVENOX) injection 40 mg, 40 mg, Subcutaneous, Daily, Michelle Luo MD, 40 mg at 03/26/20 0855    ipratropium (ATROVENT) 0.02 % nebulizer solution 0.5 mg, 0.5 mg, Nebulization, Q4H WA, Michelle Luo MD, 0.5 mg at 03/26/20 3816    Assessment:    Active Problems:    UTI (urinary tract infection)    Cancer related pain  Resolved Problems:    * No resolved hospital problems.  *    73 yo gentleman known to Dr. Gia Garcia with hx of stage IV prostate cancer involving the bone, currently stable on Xtandi since April 2019, completed palliative radiation to the lumbar sacral spine April 2019.  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  Treated with Carbo and -16 from May 20, 2019 to Sept 18, 2019 and had almost CR on PET scan July 2019.  Had recurrent disease in the liver Feb 2020. Started Keytruda Feb 13, 2020 and has had 2 cycles so far, last on 3/16/2020.  He was recently at [facility] for urosepsis earlier in March. Etta Asaf has hx of Radiation proctosigmoiditis, had a recent flexible Sigmoidoscopy in Feb 2020.  -Admitted from SNF with increased SOB, cough, fever 101.5 along with abdominal pain.  CT abd/pelvis diffuse metastatic malignancy with multiple hepatic lesions.  Large necrotic mass in left kidney with moderately enlarged retroperitoneal and inguinal LAD.  Large lobulated rectal mass. Aaron Keller enlarged prostate gland with possible malignancy involving urinary bladder which is thickened with hydronephrosis.  Infiltrates and pleural effusion right lung base with masslike density in the right lung base.  Extensive skeletal metastasis.  He underwent thoracentesis 3/21/2020  -Compared to prior CT from [place] [facility] (2/4/20), he has a new pleural effusion and the dominant liver lesion appears to have increased from 5.9 x 5.2 cm to 6 x 10 cm.      Plan:  Monitor CBC   Cytology from thoracentesis negative for malignancy   ABX for UTI and PNA, I.D on board. Midline to be placed from home IV ABX. Will go home on Cefepime   Plan is for home upon discharge.   Patient will f/u with Dr. Vance Dakins upon discharge      Electronically signed by HAMLET Dumont NP on 3/26/2020 at 11:19 AM

## 2020-03-26 NOTE — PROGRESS NOTES
ADL/functional transfers/mobility tasks. Pt demonstrating fair understanding of education/techniques, requiring additional training / education. Pt would benefit from continued skilled OT to increase functional independence and quality of life. · Pt has made limited progress towards set goals.    · Continue with current plan of care    Treatment Time In: 2:39            Treatment Time Out: 3:17               Treatment Charges: Mins Units   Ther Ex  [id number]     Manual Therapy [id number]     Thera Activities [id number] 15 1   ADL/Home Mgt 50244 23 2   Neuro Re-ed [id number]     Group Therapy      Orthotic manage/training  03464     Non-Billable Time     Total Timed Treatment 45 [id number] [facility], #[id number]

## 2020-03-26 NOTE — CARE COORDINATION
bp up to 171/107 and he was started on po norvasc 5 mg po qd . Plan is for the patient to return home with [address] and now [employer] to supply the iv atb at home. WE still need c orders and a rx written out for [employer] . Per Id the patient was started on ivf at 100 cc hr  Per Dr Jos Nichole possible home later if bp comes down.  Thanks

## 2020-03-26 NOTE — CARE COORDINATION
[facility]  the patient at 630 pm  I notified 84 ans the patients girlfrind. [facility] was notified of the patients discharge home today .

## 2020-03-26 NOTE — PROGRESS NOTES
cyanosis or edema bilaterally. Brisk capillary refill. 2+ lower extremity pulses (dorsalis pedis). Skin:  No rashes    Neurologic: awake, alert and following commands     Medications:  Reviewed    Infusion Medications     Scheduled Medications    lidocaine  1 patch Transdermal Daily    metoprolol tartrate  25 mg Oral BID    lidocaine  5 mL Intradermal Once    heparin flush  1 mL Intravenous 2 times per day    cefepime  2 g Intravenous Q12H    sodium chloride   Intravenous Q12H    fentaNYL  1 patch Transdermal Q72H    And    fentaNYL  1 patch Transdermal Q72H    potassium chloride  20 mEq Oral BID     amiodarone  200 mg Oral Daily    calcium elemental  1 tablet Oral BID    enzalutamide  160 mg Oral Dinner    montelukast  10 mg Oral Nightly    vitamin C  500 mg Oral Daily    sodium chloride flush  10 mL Intravenous 2 times per day    enoxaparin  40 mg Subcutaneous Daily    ipratropium  0.5 mg Nebulization Q4H WA     PRN Meds: sodium chloride flush, heparin flush, oxybutynin, sodium chloride flush, acetaminophen **OR** acetaminophen, polyethylene glycol, promethazine **OR** ondansetron    I/O    Intake/Output Summary (Last 24 hours) at 3/26/2020 1115  Last data filed at 3/26/2020 0915  Gross per 24 hour   Intake 200 ml   Output 1125 ml   Net -925 ml       Labs:   Recent Labs     03/24/20  1756   WBC 8.6   HGB 11.0*   HCT 35.2*          Recent Labs     03/24/20  1756      K 3.6      CO2 19*   BUN 14   CREATININE 0.9   CALCIUM 7.4*       Recent Labs     03/24/20  1756   PROT 5.5*   ALKPHOS 65   ALT 10   AST 17   BILITOT 0.5       No results for input(s): INR in the last 72 hours. No results for input(s): Chin Davis in the last 72 hours. Other labs:  Lab Results   Component Value Date    TSH 3.340 10/30/2019    INR 1.0 10/30/2019       Radiology:  Imaging studies reviewed today.     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 Levaquin we will attempt to confirm this  His blood pressure is moderately elevated and labile this will be addressed  Increase in beta-blocker addition of amlodipine  Infectious disease will place midline from the patient home on cefepime  Assess basic metabolic panel          Diet: DIET GENERAL;  Dietary Nutrition Supplements: Frozen Oral Supplement  Dietary Nutrition Supplements: Low Calorie High Protein Supplement  Code Status: DNR-CCA    PT/OT Eval Status: [] Ordered [] Evaluation noted [x] Not applicable    DVT Prophylaxis: [x]Lovenox []Heparin []PCD [] [address] []Encouraged ambulation []N/A    Likely disposition when able:  [x]Home [] Home with [facility] [] SNF/SWETHA [] Acute Rehab [] LTAC []Other    +++++++++++++++++++++++++++++++++++++++++++++++++  Jacque Hayward MD, Hospitalist  +++++++++++++++++++++++++++++++++++++++++++++++++  NOTE: This report was transcribed using voice recognition software.  Every effort was made to ensure accuracy; however, inadvertent computerized transcription errors may be present.

## 2020-03-26 NOTE — PROGRESS NOTES
[address] [place] [address] [facility]  DON  Progress Note      Chief Complaint   Patient presents with    Fever     fevers and fatigue x 1 week per [facility] temp 101.5 Hx Liver CA    Fatigue       SUBJECTIVE:      Awake and alert. Patient is tolerating medications. No reported adverse drug reactions. No problems overnight. Denies chest pain or dyspnea. Denies abdominal pain. Tolerating diet. No nausea, vomiting or diarrhea. Review of systems:    As stated above in the chief complaint, otherwise negative. Medications:    Scheduled Meds:   metoprolol tartrate  50 mg Oral BID    amLODIPine  5 mg Oral Daily    lidocaine  1 patch Transdermal Daily    lidocaine  5 mL Intradermal Once    heparin flush  1 mL Intravenous 2 times per day    cefepime  2 g Intravenous Q12H    sodium chloride   Intravenous Q12H    fentaNYL  1 patch Transdermal Q72H    And    fentaNYL  1 patch Transdermal Q72H    potassium chloride  20 mEq Oral BID WC    amiodarone  200 mg Oral Daily    calcium elemental  1 tablet Oral BID    enzalutamide  160 mg Oral Dinner    montelukast  10 mg Oral Nightly    vitamin C  500 mg Oral Daily    sodium chloride flush  10 mL Intravenous 2 times per day    enoxaparin  40 mg Subcutaneous Daily    ipratropium  0.5 mg Nebulization Q4H WA     Continuous Infusions:  PRN Meds:sodium chloride flush, heparin flush, oxybutynin, sodium chloride flush, acetaminophen **OR** acetaminophen, polyethylene glycol, promethazine **OR** ondansetron  Prior to Admission medications    Medication Sig Start Date End Date Taking? Authorizing Provider   fentaNYL (DURAGESIC) 12 MCG/HR Place 1 patch onto the skin every 72 hours for 30 days. 3/27/20 4/26/20 Yes Lakisha Poster, APRN - CNP   fentaNYL (DURAGESIC) 25 MCG/HR Place 1 patch onto the skin every 72 hours for 30 days.  3/27/20 4/26/20 Yes Lakisha Moore, APRN - CNP   mirtazapine (REMERON) 15 MG tablet Take 15 mg by mouth nightly   Yes Historical Provider, MD   amLODIPine (NORVASC) 5 MG tablet Take 5 mg by mouth daily   Yes Historical Provider, MD   Omega-3 Fatty Acids (FISH OIL) 1000 MG CAPS Take 3,000 mg by mouth daily   Yes Historical Provider, MD   calcium carbonate 600 MG TABS tablet Take 1 tablet by mouth 2 times daily   Yes Historical Provider, MD   doxycycline hyclate (VIBRA-TABS) 100 MG tablet Take 100 mg by mouth 2 times daily 3/19/20 3/28/20 Yes Historical Provider, MD   acetaminophen (TYLENOL) 325 MG tablet Take 650 mg by mouth every 4 hours as needed for Pain   Yes Historical Provider, MD   vitamin C (ASCORBIC ACID) 500 MG tablet Take 500 mg by mouth daily    Yes Historical Provider, MD   Cholecalciferol (VITAMIN D3) 75 MCG (3000 UT) TABS Take 3,000 Units by mouth 2 times daily    Yes Historical Provider, MD   metoprolol (LOPRESSOR) 100 MG tablet Take 100 mg by mouth 2 times daily    Yes Historical Provider, MD   amiodarone (CORDARONE) 200 MG tablet Take 200 mg by mouth daily   Yes Historical Provider, MD   ondansetron (ZOFRAN) 4 MG tablet Take 4 mg by mouth daily as needed for Nausea or Vomiting    Yes Historical Provider, MD   oxybutynin (DITROPAN-XL) 10 MG extended release tablet Take 10 mg by mouth daily as needed (Bladder Spasms)   Yes Historical Provider, MD   tamsulosin (FLOMAX) 0.4 MG capsule Take 0.8 mg by mouth nightly    Yes Historical Provider, MD   enzalutamide (XTANDI) 40 MG capsule Take 160 mg by mouth Daily with supper    Yes Historical Provider, MD   montelukast (SINGULAIR) 10 MG tablet Take 10 mg by mouth nightly   Yes Historical Provider, MD       OBJECTIVE:  BP (!) 171/107   Pulse 106   Temp 96.9 °F (36.1 °C)   Resp 20   Ht 5' 10\" (1.778 m)   Wt 172 lb (78 kg)   SpO2 95%   BMI 24.68 kg/m²   Temp  Av.2 °F (36.2 °C)  Min: 96.8 °F (36 °C)  Max: 98 °F (36.7 °C)  Constitutional: The patient is awake, alert, and oriented. Skin: Warm and dry. Decreased turgor. No rashes were noted.    HEENT: Round and reactive pupils. Moist mucous membranes. No ulcerations or thrush. Neck: Supple to movements. Chest: No use of accessory muscles to breathe. Symmetrical expansion. No wheezing, crackles or rhonchi. Cardiovascular: Reguar rate and rhythm. No murmurs gallops, or rubs appreciated. Abdomen: Bowel sounds present, nontender, nondistended, no masses or hepatosplenomegaly. Extremities: No clubbing, no cyanosis, no edema. Lines: peripheral    I/O last 3 completed shifts:   In: 220 [P.O.:220]  Out: [address] [Urine:775]      Laboratory and Tests Review:      Lab Results   Component Value Date    WBC 8.6 03/24/2020    WBC 9.6 03/21/2020    WBC 13.0 (H) 03/20/2020    HGB 11.0 (L) 03/24/2020    HCT 35.2 (L) 03/24/2020    MCV 90.7 03/24/2020     03/24/2020     Lab Results   Component Value Date    NEUTROABS 8.70 (H) 03/21/2020    NEUTROABS 11.63 (H) 03/20/2020    NEUTROABS 5.54 10/30/2019     No results found for: CRP  Lab Results   Component Value Date    ALT 10 03/24/2020    AST 17 03/24/2020    ALKPHOS 65 03/24/2020    BILITOT 0.5 03/24/2020     Lab Results   Component Value Date     03/26/2020    K 4.5 03/26/2020     03/26/2020    CO2 18 03/26/2020    BUN 24 03/26/2020    CREATININE 1.3 03/26/2020    CREATININE 0.9 03/24/2020    CREATININE 1.2 03/21/2020    GFRAA >60 03/26/2020    LABGLOM 54 03/26/2020    GLUCOSE 131 03/26/2020    PROT 5.5 03/24/2020    LABALBU 2.7 03/24/2020    CALCIUM 8.3 03/26/2020    BILITOT 0.5 03/24/2020    ALKPHOS 65 03/24/2020    AST 17 03/24/2020    ALT 10 03/24/2020     No results found for: CRP  No results found for: [address]    Radiology:    XR CHEST PORTABLE   Final Result      Moderate right-sided pleural effusion small left-sided pleural   effusion      No evidence of airspace consolidation or pulmonary venous congestion         CT HEAD WO CONTRAST   Final Result      NO ACUTE INTRACRANIAL PROCESS         US THORACENTESIS   Final Result   Ultrasound guidance for right thoracentesis. CT ABDOMEN PELVIS W IV CONTRAST Additional Contrast? None   Final Result   Diffuse metastatic malignancy in the abdomen and pelvis with the   multiple hypodense infiltrative hepatic lesions with the rim of soft   tissue mass surrounding the liver concerning for diffuse hepatic   malignancy. Large necrotic mass in the left kidney concerning for renal cell   carcinoma with the moderately enlarged retroperitoneal and inguinal   lymphadenopathy. A large lobulated rectal mass concerning for malignancy. There is also   enlarged prostate gland concerning for malignancy with possible   malignancy involving the urinary bladder which is  thickened with   hydronephrosis. Infiltrates and pleural effusion right lung base with a masslike   density in the right lung base which may represent focal consolidation   versus malignancy. Consider PET CT scan. Extensive skeletal metastasis. ALERT:  THIS IS AN ABNORMAL REPORT      XR CHEST PORTABLE   Final Result   Patchy and masslike infiltrates and pleural effusions in the right   lung base concerning for complicated pneumonia. Underlying malignancy   is not excluded. Surveillance preferably by CT scan is recommended.           Microbiology:   Lab Results   Component Value Date    BC 5 Days- no growth 03/20/2020    BC 5 Days- no growth 03/20/2020    ORG Pseudomonas aeruginosa 03/20/2020    ORG GNR oxidase positive 03/19/2020    ORG Pseudomonas aeruginosa 10/30/2019     Lab Results   Component Value Date    BLOODCULT2 24 Hours- no growth 03/21/2020    BLOODCULT2 5 Days- no growth 03/20/2020    ORG Pseudomonas aeruginosa 03/20/2020    ORG GNR oxidase positive 03/19/2020    ORG Pseudomonas aeruginosa 10/30/2019     No results found for: WNDABS  No results found for: RESPSMEAR  No results found for: MPNEUMO, CLAMYDCU, LABLEGI, AFBCX, FUNGSM, LABFUNG  No results found for: CULTRESP  No results found for: CXCATHTIP  Body Fluid Culture, Sterile   Date Value Ref Range Status   03/21/2020 Growth not present  Final     No results found for: CXSURG  Urine Culture, Routine   Date Value Ref Range Status   03/20/2020 >100,000 CFU/ml  Final   03/19/2020 >100,000 CFU/ml  Final   10/30/2019 >100,000 CFU/ml  Final     No results found for: [facility]    Problem list:    Active Problems:    UTI (urinary tract infection)    Cancer related pain  Resolved Problems:    * No resolved hospital problems. *      ASSESSMENT:       Complicated urinary tract infection, catheter related, pseudomonas aeruginosa     Sepsis due to urinary source, present on admission     Pleural effusion     History of hepatic carcinoma     Diffuse pelvic and abdominal metastatic disease    Clinically dehydrated. PLAN:    Continue cefepime    No further diarrhea, C. difficile test not done    If discharged home will need IV cefepime    Encourage physical therapy, likely would benefit from subacute rehab    Continue blood pressure management as per primary    Discussed with attending, will start gentle IV fluids    Recheck labs in a.m. Zander Zayas D.O. [facility]  1:37 PM  3/26/2020      Pt seen and examined. Above discussed agree . Labs, cultures, and radiographs reviewed. Face to Face encounter occurred. Changes made as necessary.      Elva Bellamy MD

## 2020-03-26 NOTE — CARE COORDINATION
bp this am 171/107 discontinued plan now is for the patient to return home with his girlfriend instead of returning to [facility] . They want [facility] ref was made but we will need [facility]. The girlfriend  is also asking for an ambulance /ambulete ride home upon discharge.

## 2020-03-26 NOTE — PROGRESS NOTES
Physical Therapy  Treatment Note  Name: Demetria Case  : 1945  MRN: [id number]    Referring Provider: Gordo Marcial MD    Date of Service: 3/26/2020    Evaluating PT: Juwan Salma, PT, DPT, [id number]    Room #: 8679/1212-J  Diagnosis: UTI  PMHx/PSHx: OA, HTN, prostate CA, asthma  Procedures/Surgeries: Thoracentesis (3/21)  Precautions: Fall risk, incontinent of feces, yaenz, alarm    SUBJECTIVE:    Pt is questionable historian. Per pt, pt is from [place] [address]. Pt lives with significant other in a single story house with 4 stair(s) and 1 rail(s) to enter. Bed is on first floor and bath is on first floor. Pt ambulated with [employer] for short distances with staff assistance at [place] [address] prior to admission. Pt ambulated without AD prior to SWETHA. OBJECTIVE:   Initial Evaluation  Date: 3/23/20 Treatment Date: 3/26/20  Short Term/ Long Term   Goals   AM-PAC 6 Clicks 92/78 53/53    Was pt agreeable to Eval/treatment? Yes yes    Does pt have pain? -8/10 lower back \"ache\" 6-7/10 low back    Bed Mobility  Rolling: Min A  Supine to sit: Max A  Sit to supine: Max A  Scooting: Max A toward EOB Rolling MaxA  Supine to sit MaxA  Sit to supine MaxA  Scooting MaxA Rolling: SBA  Supine to sit: SBA  Sit to supine: SBA  Scooting: SBA   Transfers Sit to stand: Max A  Stand to sit:  Max A  Stand pivot: NT Sit to stand: [address]  Stand to sit: ModA  Stand pivot: NT Sit to stand: Min A  Stand to sit: Min A  Stand pivot: Min A with [employer]   Ambulation   NT N/T >25 feet with [employer] with Mod A   Stair negotiation: ascended and descended NT N/T 4 step(s) with 1 rail(s) with Mod A   ROM BUE: Refer to OT note  BLE: WFL     Strength BUE: Refer to OT note  BLE: NT     Balance Sitting EOB: SBA to Mod A  Dynamic Standing: NT Sitting EOB ModA  Dynamic Standing: ModA Sitting EOB: Supervision  Dynamic Standing: Min A with [employer]     Pt is A & O x 4  Sensation:  Pt denies numbness and tingling to extremities  Edema:  None    Therapeutic Exercises:  None    Patient education  Pt educated on safety awareness, energy conservation, proper hand placement and sequencing for bed mobility and transfers, PLB throughout activity to help with regulate HR and improve O2 Saturation. Patient response to education:   Pt verbalized understanding Pt demonstrated skill Pt requires further education in this area   y partial y     ASSESSMENT:    Patient exhibits decreased strength, balance, coordination impairing functional mobility. Educated pt on techniques to improve safety and independence with bed mobility, transfers, balance, functional transfers, and functional mobility. Pt sat EOB for 15 minutes with Min/ModA in order to improve static and dynamic sitting balance and activity tolerance. Pt ambulated with decreased sahra and stride length 3-5' with [facility] with ModA. Pt was unsteady side stepping toward the [facility] and required VC for sequencing, and for foot/hand placement during transfers and side stepping. Pt required Max encouragement and increased time to participate in session and had to attempt standing 2x to execute. Pt was incontinent of the bowel during session and required clean up from the nurses assistant and bed linens changed. Pt demonstrated fair understanding of education/techniques requiring additional education/training as well as fair safety awareness. At end of session, pt was left in bed with call light in reach and all needs met. Pt would benefit from continued skilled PT services to increase functional independence and overall quality of life. Treatment:    Pt given VC for hand placement and sequencing to address deficits for bed mobility, transfers, ambulation. Pt given VC for safety throughout session to reduce risk of falls. Pt was given instruction for sit >< stand transfers regarding weight shifting, sequencing, and proper foot/hand placement to achieve an effective stand.  Pt was given VC to increase stride length and sahra to normalize gait mechanics and improve stability as well as instruction regarding heel to toe step through gait to improve dynamic standing balance. Adaptive device was adjusted to proper height for the patient, and pt was given VC for [employer] approximation during ambulation. Pt's/ family goals   To improve strength, endurance, balance, improve breathing, decrease pain. Patient and or family understand(s) diagnosis, prognosis, and plan of care. PLAN:    PT care will be provided in accordance with the objectives noted above. Exercises and functional mobility practice will be used as well as appropriate assistive devices or modalities to obtain goals. Patient and family education will also be administered as needed. Frequency of treatments: 1x daily for 4-7x per week for 2-4 days.     Time in  1438  Time out  1517    (Evaluation time includes thorough review of current medical information, gathering information on past medical history/social history and prior level of function, completion of standardized testing/informal observation of tasks, assessment of data, and development of POC/Goals.)    Total Treatment Time  38 minutes    CPT codes:  [] PT Re-evaluation 89910  [] Gait training 64398   [] Manual therapy 72096   [x] Therapeutic activities 31879     46 minutes  [] Therapeutic exercises 79934      [] Neuromuscular reeducation [facility], PT, DPT   [id number]

## 2020-03-26 NOTE — PLAN OF CARE
Problem: Falls - Risk of:  Goal: Will remain free from falls  Description: Will remain free from falls  3/25/2020 2118 by Leah Gallo RN  Outcome: Met This Shift

## 2020-03-26 NOTE — PLAN OF CARE
Problem: Falls - Risk of:  Goal: Will remain free from falls  Description: Will remain free from falls  3/26/2020 0758 by Amisha Salinas RN  Outcome: Met This Shift  3/26/2020 0524 by German Child RN  Outcome: Met This Shift  3/25/2020 2118 by Marleny Welch RN  Outcome: Met This Shift  Goal: Absence of physical injury  Description: Absence of physical injury  3/26/2020 0758 by Amisha Salinas RN  Outcome: Met This Shift  3/26/2020 0524 by German Child RN  Outcome: Met This Shift     Problem: Pain:  Goal: Pain level will decrease  Description: Pain level will decrease  3/26/2020 0758 by Amisha Salinas RN  Outcome: Met This Shift  3/26/2020 0524 by German Child RN  Outcome: Met This Shift  Goal: Control of acute pain  Description: Control of acute pain  3/26/2020 0758 by Amisha Salinas RN  Outcome: Met This Shift  3/26/2020 0524 by German Child RN  Outcome: Met This Shift  Goal: Control of chronic pain  Description: Control of chronic pain  3/26/2020 0758 by Amisha Salinas RN  Outcome: Met This Shift  3/26/2020 0524 by German Child RN  Outcome: Met This Shift     Problem: Urinary Elimination:  Goal: Signs and symptoms of infection will decrease  Description: Signs and symptoms of infection will decrease  3/26/2020 0758 by Amisha Salinas RN  Outcome: Met This Shift  3/26/2020 0524 by German Child RN  Outcome: Met This Shift  Goal: Ability to reestablish a normal urinary elimination pattern will improve - after catheter removal  Description: Ability to reestablish a normal urinary elimination pattern will improve  Outcome: Met This Shift  Goal: Complications related to the disease process, condition or treatment will be avoided or minimized  Description: Complications related to the disease process, condition or treatment will be avoided or minimized  Outcome: Met This Shift     Problem: Airway Clearance - Ineffective:  Goal: Clear lung sounds  Description: Clear lung sounds  3/26/2020 0758 by Collette Basilio RN  Outcome: Met This Shift  3/26/2020 0524 by Uri Marcial RN  Outcome: Met This Shift  Goal: Ability to maintain a clear airway will improve  Description: Ability to maintain a clear airway will improve  3/26/2020 0758 by Collette Basilio RN  Outcome: Met This Shift  3/26/2020 0524 by Uri Marcial RN  Outcome: Met This Shift     Problem: Gas Exchange - Impaired:  Goal: Levels of oxygenation will improve  Description: Levels of oxygenation will improve  3/26/2020 0758 by Collette Basilio RN  Outcome: Met This Shift  3/26/2020 0524 by Uri Marcial RN  Outcome: Met This Shift

## 2020-03-26 NOTE — PLAN OF CARE
Problem: Falls - Risk of:  Goal: Will remain free from falls  Description: Will remain free from falls  3/26/2020 0524 by Veronica Tejada RN  Outcome: Met This Shift     Problem: Falls - Risk of:  Goal: Absence of physical injury  Description: Absence of physical injury  3/26/2020 0524 by Veronica Tejada RN  Outcome: Met This Shift     Problem: Pain:  Goal: Pain level will decrease  Description: Pain level will decrease  3/26/2020 0524 by Veronica Tejada RN  Outcome: Met This Shift     Problem: Pain:  Goal: Control of acute pain  Description: Control of acute pain  Outcome: Met This Shift     Problem: Pain:  Goal: Control of chronic pain  Description: Control of chronic pain  Outcome: Met This Shift     Problem: Urinary Elimination:  Goal: Signs and symptoms of infection will decrease  Description: Signs and symptoms of infection will decrease  Outcome: Met This Shift     Problem: Airway Clearance - Ineffective:  Goal: Clear lung sounds  Description: Clear lung sounds  Outcome: Met This Shift     Problem: Airway Clearance - Ineffective:  Goal: Ability to maintain a clear airway will improve  Description: Ability to maintain a clear airway will improve  Outcome: Met This Shift     Problem: Gas Exchange - Impaired:  Goal: Levels of oxygenation will improve  Description: Levels of oxygenation will improve  Outcome: Met This Shift

## 2020-03-27 PROBLEM — E87.29 HIGH ANION GAP METABOLIC ACIDOSIS: Status: ACTIVE | Noted: 2020-01-01

## 2020-03-27 PROBLEM — N17.9 AKI (ACUTE KIDNEY INJURY) (HCC): Status: ACTIVE | Noted: 2020-01-01

## 2020-03-27 PROBLEM — I48.91 ATRIAL FIBRILLATION WITH RVR (HCC): Status: ACTIVE | Noted: 2020-01-01

## 2020-03-27 PROBLEM — G93.41 ACUTE METABOLIC ENCEPHALOPATHY: Status: ACTIVE | Noted: 2020-01-01

## 2020-03-27 PROBLEM — J90 BILATERAL PLEURAL EFFUSION: Status: ACTIVE | Noted: 2020-01-01

## 2020-03-27 PROBLEM — J18.9 PNA (PNEUMONIA): Status: ACTIVE | Noted: 2020-01-01

## 2020-03-27 PROBLEM — C79.9 METASTATIC CANCER (HCC): Status: ACTIVE | Noted: 2020-01-01

## 2020-03-27 PROBLEM — K62.89 RECTAL MASS: Status: ACTIVE | Noted: 2020-01-01

## 2020-03-27 PROBLEM — I16.0 HYPERTENSIVE URGENCY: Status: ACTIVE | Noted: 2020-01-01

## 2020-03-27 PROBLEM — R18.0 MALIGNANT ASCITES: Status: ACTIVE | Noted: 2020-01-01

## 2020-03-27 PROBLEM — A41.9 SEPSIS (HCC): Status: ACTIVE | Noted: 2020-01-01

## 2020-03-27 NOTE — CARE COORDINATION
Pt daughter and Amber Rubio ([phone]) has chosen [facility]. Referral made. Updated bedside nurse that hospice nurse will be calling back.   Pt son Leda Rollins ([phone]) spoke with pt via phone per his request.   Omar Barbosa RN CM

## 2020-03-27 NOTE — CARE COORDINATION
I spoke to Jorge Dinh from [facility] and now they will not accept the patient because he has been out in the community . I spoke to the daughter  and gave the option of consulting hospice care to call her and set up home equipment at home . The dgt is thinking  the patient may need to return to the hospital to be sent to another skilled facility. I tried to talk her in to the hospice care are of his Cancer diagnosis . The daughter and girlfriend will discuss the options and call me back .

## 2020-03-27 NOTE — CARE COORDINATION
Social Work [address] [place] [address] Planning:    Pt presents to the ED secondary to shortness of breath and fatigue. Pt is from home and discharged from this hospital yesterday 3/26. SW spoke to pt's daughter/POA, over the phone. Pt's POA/Living Will paperwork scanned in pt's chart. Pt's daughter reports pt lives with his girlfriend and she has been making a lot of the decisions with pt as far as discharge planning but when pt discharged home from the hospital, this morning he was very fatigued, confused, short of breath and appeared to be in pain. Irma Schneider, Physician Assistant in ED, spoke with pt's POA/daughter and further explained pt's current condition and briefly discussed code status.   Irma Schneider also discussed possible hospice care with daughter who stated she would like to speak with her siblings first.

## 2020-03-27 NOTE — PROGRESS NOTES
1800h -Notified by hospice [facility] they do not have availability to take the patient now, they recommend speak with internal medicine and having the patient put in the hospital here pending hospice house availability. Spoke with medicine they will keep the patient    Spoke with Dr. Eleanor Rucker (Medicine). Discussed case. They will admit this patient.       --------------------------------- IMPRESSION AND DISPOSITION ---------------------------------    IMPRESSION  1. Metastatic disease ([facility] [address]    2. DNR (do not resuscitate) discussion    3. DNR (do not resuscitate)    4. Hospice care    5. Pleural effusion    6.  Rectal mass        DISPOSITION  Disposition: Admit to [facility] 2/2 DNR CC / Hospice   Patient condition is serious

## 2020-03-27 NOTE — ED PROVIDER NOTES
ED Attending  CC: Goldie       Department of Emergency Medicine   ED  Provider Note  Admit Date/RoomTime: 3/27/2020  1:29 PM  ED Room: 8873/0941-Y  MRN: [id number]  Chief Complaint:   Shortness of Breath (SOB and fatigued, was discharged yesterday from here after having pneumonia) and Fatigue       History of Present Illness   Source of history provided by:  patient. History/Exam Limitations: none. Micky Spence is a 76 y.o. male who has a past medical history of:   Past Medical History:   Diagnosis Date    Arthritis     Asthma     GIB (gastrointestinal bleeding)     History of blood transfusion     Hypertension     Paroxysmal atrial flutter ([facility] [address] 4/21/2019    Primary prostate cancer with metastasis from prostate to other site [facility]) 03/2018    already had metastasized.  Sleep apnea     presents to the ED by ambulance for increased confusion and lethargy. He lives at home with his significant other, daughter is here with him as well earlier today and she stated that he had increasing shortness of breath and fatigue, increased confusion and states that they are unable to care for him at home. Patient does report that he feels more short of breath after being discharged home from the hospital yesterday with pneumonia. He has generalized weakness and fatigue. He denies any chest pain, no fevers or chills. He does report a cough. ROS   Pertinent positives and negatives are stated within HPI, all other systems reviewed and are negative.        Past Surgical History:   Procedure Laterality Date    CHOLECYSTECTOMY      COLON SURGERY      scar tissue removed     COLONOSCOPY N/A 4/24/2019    COLONOSCOPY DIAGNOSTIC performed by Heather Damon MD at [address] [facility] COLONOSCOPY N/A 11/1/2019    COLONOSCOPY CONTROL HEMORRHAGE performed by Imtiaz Arroyo DO at [address] [facility]  2/3/2020    COLONOSCOPY CONTROL HEMORRHAGE performed by Imtiaz Arroyo DO at [facility] 1760 focal deficits, symmetric strength 5/5 in the upper and lower extremities bilaterally. Moves all 4 extremities without difficulty. No slurred speech.   · Psychiatric: Normal Affect      Lab / Imaging Results   (All laboratory and radiology results have been personally reviewed by myself)  Labs:  Results for orders placed or performed during the hospital encounter of 03/27/20   CBC Auto Differential   Result Value Ref Range    WBC 12.7 (H) 4.5 - 11.5 E9/L    RBC 3.86 3.80 - 5.80 E12/L    Hemoglobin 10.7 (L) 12.5 - 16.5 g/dL    Hematocrit 35.4 (L) 37.0 - 54.0 %    MCV 91.7 80.0 - 99.9 fL    MCH 27.7 26.0 - 35.0 pg    MCHC 30.2 (L) 32.0 - 34.5 %    RDW 17.9 (H) 11.5 - 15.0 fL    Platelets 737 743 - 951 E9/L    MPV 8.8 7.0 - 12.0 fL    Neutrophils % 89.8 (H) 43.0 - 80.0 %    Immature Granulocytes % 1.6 0.0 - 5.0 %    Lymphocytes % 3.0 (L) 20.0 - 42.0 %    Monocytes % 5.4 2.0 - 12.0 %    Eosinophils % 0.0 0.0 - 6.0 %    Basophils % 0.2 0.0 - 2.0 %    Neutrophils Absolute 11.37 (H) 1.80 - 7.30 E9/L    Immature Granulocytes # 0.20 E9/L    Lymphocytes Absolute 0.38 (L) 1.50 - 4.00 E9/L    Monocytes Absolute 0.68 0.10 - 0.95 E9/L    Eosinophils Absolute 0.00 (L) 0.05 - 0.50 E9/L    Basophils Absolute 0.02 0.00 - 0.20 E9/L    Anisocytosis 1+     Polychromasia 2+     Hypochromia 1+     Poikilocytes 1+     María Cells 1+     Ovalocytes 1+     Tear Drop Cells 1+    Comprehensive Metabolic Panel   Result Value Ref Range    Sodium 140 132 - 146 mmol/L    Potassium 3.6 3.5 - 5.0 mmol/L    Chloride 104 98 - 107 mmol/L    CO2 13 (L) 22 - 29 mmol/L    Anion Gap 23 (H) 7 - 16 mmol/L    Glucose 149 (H) 74 - 99 mg/dL    BUN 27 (H) 8 - 23 mg/dL    CREATININE 1.2 0.7 - 1.2 mg/dL    GFR Non-African American 59 >=60 mL/min/1.73    GFR African American >60     Calcium 8.4 (L) 8.6 - 10.2 mg/dL    Total Protein 5.7 (L) 6.4 - 8.3 g/dL    Alb 2.7 (L) 3.5 - 5.2 g/dL    Total Bilirubin 0.4 0.0 - 1.2 mg/dL    Alkaline Phosphatase 75 40 - 129 U/L ALT 17 0 - 40 U/L    AST 20 0 - 39 U/L   Troponin   Result Value Ref Range    Troponin <0.01 0.00 - 0.03 ng/mL   Brain Natriuretic Peptide   Result Value Ref Range    Pro-BNP 3,947 (H) 0 - 450 pg/mL   Lactic Acid, Plasma   Result Value Ref Range    Lactic Acid 1.3 0.5 - 2.2 mmol/L   Ammonia   Result Value Ref Range    Ammonia 43.0 16.0 - 60.0 umol/L   Protime-INR   Result Value Ref Range    Protime 13.9 (H) 9.3 - 12.4 sec    INR 1.2    Procalcitonin   Result Value Ref Range    Procalcitonin >100.00 (H) 0.00 - 0.08 ng/mL   Urinalysis with Microscopic   Result Value Ref Range    Color, UA Yellow Straw/Yellow    Clarity, UA SL CLOUDY Clear    Glucose, Ur Negative Negative mg/dL    Bilirubin Urine Negative Negative    Ketones, Urine 15 (A) Negative mg/dL    Specific Gravity, UA 1.020 1.005 - 1.030    Blood, Urine LARGE (A) Negative    pH, UA 5.5 5.0 - 9.0    Protein, UA 30 (A) Negative mg/dL    Urobilinogen, Urine 0.2 <2.0 E.U./dL    Nitrite, Urine POSITIVE (A) Negative    Leukocyte Esterase, Urine MODERATE (A) Negative    WBC, UA >20 (A) 0 - 5 /HPF    RBC, UA >20 0 - 2 /HPF    Bacteria, UA MODERATE (A) None Seen /HPF   EKG 12 Lead   Result Value Ref Range    Ventricular Rate 122 BPM    Atrial Rate 122 BPM    P-R Interval 140 ms    QRS Duration 100 ms    Q-T Interval 340 ms    QTc Calculation (Bazett) 484 ms    P Axis 41 degrees    R Axis -12 degrees    T Axis 75 degrees     Imaging: All Radiology results interpreted by Radiologist unless otherwise noted. US DUP LOWER EXTREMITY RIGHT SHEILA   Final Result      1. No evidence to suggest deep venous thrombosis of the right lower   extremity. 2. Enlarged lymph nodes are seen at level of the right groin. Clinical   correlation recommended. CT Head WO Contrast   Final Result      No acute intracranial hemorrhage or edema. If clinical concern exists   for acute stroke, MRI brain with diffusion-weighted imaging could be   helpful for further evaluation. CTA CHEST W CONTRAST   Final Result      1. No evidence of acute pulmonary embolic disease. Large right pleural   effusion with adjacent atelectasis. Relatively small left pleural   effusion with adjacent atelectasis. 2. Cardiomegaly. Dilated ascending aorta at 4.2 cm. 3. Ascites with malignant carcinomatosis. Malignant mass at the upper   pole the left kidney and multiple hepatic metastases. 4. Dilated lower GI tract with a relatively obstructing mass in the   anorectal region. 5. Extensive osseous metastatic disease in the thorax, abdomen and   pelvis. CT ABDOMEN PELVIS W IV CONTRAST Additional Contrast? None   Final Result      1. No evidence of acute pulmonary embolic disease. Large right pleural   effusion with adjacent atelectasis. Relatively small left pleural   effusion with adjacent atelectasis. 2. Cardiomegaly. Dilated ascending aorta at 4.2 cm. 3. Ascites with malignant carcinomatosis. Malignant mass at the upper   pole the left kidney and multiple hepatic metastases. 4. Dilated lower GI tract with a relatively obstructing mass in the   anorectal region. 5. Extensive osseous metastatic disease in the thorax, abdomen and   pelvis. EKG #1:  Interpreted by emergency department physician unless otherwise noted. Time:  1405    Rate: 122 bpm  Rhythm: Sinus tachycardia. No ST segment elevation or depression. VA intervals 140 ms, QRS duration is 100 ms.       ED Course / Medical Decision Making     Medications   sodium chloride flush 0.9 % injection 10 mL (has no administration in time range)   fentaNYL (SUBLIMAZE) injection 25 mcg (25 mcg Intravenous Given 3/27/20 1745)   amiodarone (CORDARONE) tablet 200 mg (200 mg Oral Not Given 3/28/20 0803)   amLODIPine (NORVASC) tablet 5 mg (5 mg Oral Not Given 3/28/20 0803)   enzalutamide Joetoshia Babb) capsule 160 mg (has no administration in time range)   fentaNYL (DURAGESIC) 25 MCG/HR 1 patch (1 patch Transdermal Patch Applied 3/27/20 2353)   metoprolol tartrate (LOPRESSOR) tablet 100 mg (100 mg Oral Not Given 3/28/20 0803)   potassium chloride (KLOR-CON M) extended release tablet 20 mEq (20 mEq Oral Not Given 3/28/20 0803)   0.9 % sodium chloride infusion ( Intravenous New Bag 3/27/20 2307)   glucose (GLUTOSE) 40 % oral gel 15 g (has no administration in time range)   dextrose 50 % IV solution (has no administration in time range)   glucagon (rDNA) injection 1 mg (has no administration in time range)   dextrose 5 % solution (has no administration in time range)   cefepime (MAXIPIME) 2 g IVPB extended (mini-bag) (0 g Intravenous Stopped 3/28/20 0456)   0.9 % sodium chloride infusion admixture ( Intravenous Stopped 3/28/20 0641)   morphine (PF) injection 2 mg (has no administration in time range)   sodium chloride flush 0.9 % injection 10 mL (10 mLs Intravenous Given 3/28/20 0849)   sodium chloride flush 0.9 % injection 10 mL (has no administration in time range)   acetaminophen (TYLENOL) tablet 650 mg (has no administration in time range)     Or   acetaminophen (TYLENOL) suppository 650 mg (has no administration in time range)   labetalol (NORMODYNE;TRANDATE) injection 5 mg (5 mg Intravenous Given 3/28/20 0849)   iopamidol (ISOVUE-370) 76 % injection 75 mL (75 mLs Intravenous Given 3/27/20 1641)     ED Course as of Mar 28 1027   Fri Mar 27, 2020   1435 Patient is a 70-year-old male with history of metastatic prostate cancer and rectal mass on Keytruda and Xgeva presenting with shortness of breath and confusion. Patient was alert and oriented x2 on my evaluation. He was tachycardic. He had a murmur. Lung sounds were diminished but unremarkable. Abdomen is mildly distended but nontender. States he had a bowel movement on arrival to the ED. He vomited yesterday, but he has had no vomiting today. Plan to obtain labs and imaging and reevaluate. Likely admit.     [JA]      ED Course User Index  [JA] Irina Leggett MD Re-Evaluations:  3/27/20      Time: 1630     I have had extensive conversation with Lenore Oliveira, daughter and POA of the patient. We discussed the severity of the patient's cancer, metastatic, increased confusion, shortness of breath and deterioration at home. She states that they were unable to care for him at home, she did see the patient this morning prior to coming to the ER. She also notes a change in his mentation, confusion, deterioration of his physical condition. She states that he was in the hospital until yesterday, the plan was to discharge home to [facility]. The patient and his significant other declined to have him discharged home there and he was sent home. It was found that they were unable to care for him at home and they had discussed hospice as well. She is open to having a discussion with this but would like to speak with her brother. Reports that she will coming back with her decision. Time: 1640   I spoke w/Mir, the patient's son. He request to speak with the patient himself and he and his sister will make that decision of whether or not to send home with hospice. Time: 9123   I spoke w/Kylah again, she states that they have made the final decision to proceed with hospice care. Advised her that we will arrange this for her. Consultations:             IP CONSULT TO HOSPICE  IP CONSULT TO INTERNAL MEDICINE  IP CONSULT TO SOCIAL WORK  IP CONSULT TO HOSPICE    Procedures:   none    MDM: Patient with diffuse, severe, metastatic cancer throughout his chest, abdomen and pelvis. Patient has worsening confusion, shortness of breath, deterioration of his overall condition. I spoke with the patient's children, POA, they do request that he go to the hospice house here today.     Counseling:   I have spoken with the children, POA and discussed todays results, in addition to providing specific details for the plan of care and counseling regarding the diagnosis and prognosis and are agreeable with the plan. Assessment      1. Metastatic disease ([facility] [address]    2. DNR (do not resuscitate) discussion    3. DNR (do not resuscitate)    4. Hospice care    5. Pleural effusion    6. Rectal mass         Plan   Discharge to hospice house. Patient condition is poor. New Medications     Current Discharge Medication List        Electronically signed by CRISTOBAL Stevens   DD: 3/27/20  **This report was transcribed using voice recognition software. Every effort was made to ensure accuracy; however, inadvertent computerized transcription errors may be present.   END OF PROVIDER NOTE       Glenis Stevens  03/28/20 1027

## 2020-03-27 NOTE — ED NOTES
Radiology Procedure Waiver   Name: Teressa Ag  : 1945  MRN: [id number]    Date:  3/27/20    Time: 3:27 PM EDT    Benefits of immediately proceeding with Radiology exam(s) without pre-testing outweigh the risks or are not indicated as specified below and therefore the following is/are being waived:    [] Pregnancy test   [] Patients LMP on-time and regular.   [] Patient had Tubal Ligation or has other Contraception Device. [] Patient  is Menopausal or Premenarcheal.    [] Patient had Full or Partial Hysterectomy. [x] Protocol for Iodine allergy- patient tolerated IV contrast on 3/20/20 w/o premedication or difficulty. [] MRI Questionnaire     [] BUN/Creatinine   [] Patient age w/no hx of renal dysfunction. [] Patient on Dialysis. [] Recent Normal Labs.   Electronically signed by CRISTOBAL Danile on 3/27/20 at 3:27 PM EDT               CRISTOBAL Daniel  20 [phone]

## 2020-03-27 NOTE — ED NOTES
Patient bi-pap machine and a bottle of chemo medication was dropped off by family       Misty Soler RN  03/27/20 6071

## 2020-03-27 NOTE — CARE COORDINATION
Received a call from the patients girlfriend that she may not be able to care for the patient at home. I also received a call from the patients daughter that she may want him to return to [facility] to get rehab. The daughter is on her way to the girlfriends home and she will call me once she gets there. I also re called [facility] to make sure they got the referral await a call back. I called Veverly Cheadle and she is checking with the facility  To see if we can bring him to the nursing home.

## 2020-03-27 NOTE — ED NOTES
Spoke face to face with pts daughter (POA) and significant other, copies of living will and medical POA papers placed in soft chart, password set up for family to call in to obtain information on pt, password: Taurus Clark RN  03/27/20 0349

## 2020-03-27 NOTE — ED NOTES
Significant other called and reported that since the patient started wearing the pain patch (fentanyl) that is when she noticed a change in his behavior, attending aware that the patch is on the left shoulder region, assessment of patient revealed no altered breathing, constricted pupils or difficulty to arouse, patient is resting in bed comfortably.         Niharika Thrasher RN  03/27/20 6198

## 2020-03-28 NOTE — PROGRESS NOTES
Jaylongutierrez Jimenez was ordered enzalutamide Radha Alter) which is a nonformulary medication. The patient has indicated that the home supply of this medication will be brought in to the hospital for inpatient use. If the medication has not been administered by 1400 on the following day from the time the order was placed, a pharmacist will follow-up with the nurse of the patient to assess the capability of the patient to bring in the medication. If it is determined that the patient cannot supply the medication and it is not available to be dispensed from the pharmacy, a call will be placed to the ordering provider to discuss alternative options.      Ayleen Celaya, PharmD  03/27/20 10:13 PM

## 2020-03-28 NOTE — DISCHARGE INSTR - COC
Continuity of Care Form    Patient Name: Lana Abarca   :  1945  MRN:  [id number]    Admit date:  3/27/2020  Discharge date:  ***    Code Status Order: [facility]   Advance Directives:   [facility] Documentation     Date/Time Healthcare Directive Type of Healthcare Directive Copy in [facility] [address] Agent's Name Healthcare Agent's Phone Number    20 [phone]  No, patient does not have an advance directive for healthcare treatment -- -- -- -- --          Admitting Physician:  Jordana Interiano MD  PCP: Nidia Cummings MD    Discharging Nurse: [facility] Unit/Room#: 1650/9402-E  Discharging Unit Phone Number: ***    Emergency Contact:   Extended Emergency Contact Information  Primary Emergency Contact: Mignon Ward  Address: [address] [place], [place] [address]beth P[address], [address] [place] [address] Phone: [phone]  Mobile Phone: [phone]  Relation: Other  Secondary Emergency Contact: ortiz mckeon  Home Phone: [phone]  Mobile Phone: [phone]  Relation: Child    Past Surgical History:  Past Surgical History:   Procedure Laterality Date    CHOLECYSTECTOMY      COLON SURGERY      scar tissue removed     COLONOSCOPY N/A 2019    COLONOSCOPY DIAGNOSTIC performed by Zion Martinez MD at [address] [facility] COLONOSCOPY N/A 2019    COLONOSCOPY CONTROL HEMORRHAGE performed by Ani Richards DO at [facility]  2/3/2020    COLONOSCOPY CONTROL HEMORRHAGE performed by Ani Richards DO at [address] N/A 2019    FLEXIBLE SIGMOIDOSCOPY, CONTROL HEMORRHAGE performed by Ani Richards DO at [facility] N/A 2/3/2020    [address] (CPT [id number]) performed [facility] DO [facility]  2019    SINUS SURGERY         Immunization History:   Immunization History   Administered Date(s) Administered    Rabies 03/13/2017, 03/16/2017, 03/20/2017, 03/27/2017    Rabies Immune Globulin 03/13/2017    Tdap (Boostrix, Adacel) 03/10/2017       Active Problems:  Patient Active Problem List   Diagnosis Code    Dog bite of right thigh I68.946E, W54. 0XXA    GI bleed K92.2    Primary prostate cancer with metastasis from prostate to other site [facility]) C61    Hypertension I10    GIB (gastrointestinal bleeding) K92.2    Asthma J45.909    Paroxysmal atrial flutter (HCC) I48.92    Hepatic lesion K76.9    Hypokalemia E87.6    Moderate protein-calorie malnutrition (HCC) E44.0    Anemia D64.9    Gastroesophageal reflux disease K21.9    UTI (urinary tract infection) N39.0    Cancer related pain G89.3    Metastatic cancer (HCC) C79.9    Bilateral pleural effusion J90    PNA (pneumonia) J18.9    Sepsis (HCC) A41.9    Hypertensive urgency I16.0    Acute metabolic encephalopathy G92.18    High anion gap metabolic acidosis K54.4    KAYLEEN (acute kidney injury) ([facility] [address] N17.9    Malignant ascites R18.0    Rectal mass K62.89    Atrial fibrillation with RVR (HCC) I48.91       Isolation/Infection:   Isolation          No Isolation        Patient Infection Status     Infection Onset Added Last Indicated Last Indicated By Review Planned Expiration Resolved Resolved By    C-diff Rule Out 03/25/20 03/25/20 03/25/20 C. difficile toxin Molecular (Ordered)              Nurse Assessment:  Last Vital Signs: BP (!) 173/106   Pulse 91   Temp 97.3 °F (36.3 °C) (Temporal)   Resp 20   Ht 5' 10\" (1.778 m)   Wt 172 lb (78 kg)   SpO2 97%   BMI 24.68 kg/m²     Last documented pain score (0-10 scale): Pain Level: 0  Last Weight:   Wt Readings from Last 1 Encounters:   03/27/20 172 lb (78 kg)     Mental Status:  {IP PT MENTAL STATUS:20030}    IV Access:  {[facility] IV ACCESS:664881928}    Nursing Mobility/ADLs:  Walking   {[facility] TDOM:735353318}  Transfer  {[facility] KLSN:396442502}  Bathing {CHP DME ONYM:464399685}  Dressing  {CHP DME DIFH:869723440}  Toileting  {CHP DME FEKW:227661392}  Feeding  {CHP DME TFAP:326668751}  Med Admin  {CHP DME SUHZ:189393052}  Med Delivery   { [facility] MED Delivery:806774638}    Wound Care Documentation and Therapy:        Elimination:  Continence:   · Bowel: {YES / RE:05174}  · Bladder: {YES / KJ:96898}  Urinary Catheter: {Urinary Catheter:606386778}   Colostomy/Ileostomy/Ileal Conduit: {YES / CM:03090}       Date of Last BM: ***    Intake/Output Summary (Last 24 hours) at 3/28/2020 1118  Last data filed at 3/28/2020 1013  Gross per 24 hour   Intake 1310 ml   Output 1200 ml   Net 110 ml     I/O last 3 completed shifts: In: 5833 [P.O.:120;  I.V.:950]  Out: 1200 [Urine:1200]    Safety Concerns:     { AJAY Safety Concerns:399223504}    Impairments/Disabilities:      { AJAY Impairments/Disabilities:718607978}    Nutrition Therapy:  Current Nutrition Therapy:   [address] AJAY Diet List:728409182}    Routes of Feeding: {[facility] DME Other Feedings:200435976}  Liquids: {Slp liquid thickness:79884}  Daily Fluid Restriction: {P DME Yes amt example:884457715}  Last Modified Barium Swallow with Video (Video Swallowing Test): {Done Not Done LGZK:182157939}    Treatments at the Time of Hospital Discharge:   Respiratory Treatments: ***  Oxygen Therapy:  {Therapy; copd oxygen:02184}  Ventilator:    { CC Vent AUXT:051564958}    Rehab Therapies: {THERAPEUTIC INTERVENTION:1333396974}  Weight Bearing Status/Restrictions: [facility]  Weight Bearin}  Other Medical Equipment (for information only, NOT a DME order):  {EQUIPMENT:697738840}  Other Treatments: ***    Patient's personal belongings (please select all that are sent with patient):  {[facility] DME Belongings:913335472}    RN SIGNATURE:  {Esignature:500612895}    CASE MANAGEMENT/SOCIAL WORK SECTION    Inpatient Status Date: ***    Readmission Risk Assessment Score:  Readmission Risk              Risk of Unplanned Readmission:        32 Discharging to Facility/ Agency   · Name:   · Address:  · Phone:  · Fax:    Dialysis Facility (if applicable)   · Name:  · Address:  · Dialysis Schedule:  · Phone:  · Fax:    / signature: {Esignature:759654539}    PHYSICIAN SECTION    Prognosis: {Prognosis:1891655701}    Condition at Discharge: Shyanne Warren Patient Condition:771528873}    Rehab Potential (if transferring to Rehab): {Prognosis:7736618621}    Recommended Labs or Other Treatments After Discharge: ***    Physician Certification: I certify the above information and transfer of Marielle Puga  is necessary for the continuing treatment of the diagnosis listed and that he requires {Admit to Appropriate Level of Care:37625} for {GREATER/LESS:469587602} 30 days.      Update Admission H&P: {CHP DME Changes in WOPMB:294212051}    PHYSICIAN SIGNATURE:  Electronically signed by Samson Clark DO on 3/28/20 at 11:18 AM EDT

## 2020-03-28 NOTE — PROGRESS NOTES
RN spoke to daughter Wale Shah and she gave a fax number to send the prescription about the financial POA too.  The number is [phone]

## 2020-03-28 NOTE — CARE COORDINATION
SOCIAL WORK/CASEMANAGEMENT TRANSITION OF CARE PLANNING: I called Thai Ng [phone] the daughter. The rn called and said pt is not appropriate for hospice house or inpatient hospice. She wants pt to return to [facility]. She said pt has long term care insurance to cover the room and board. She is also requesting a statement from the pcp that pt is no longer able to make financial decisions and that a power of  is needed at this time. Tash Gan needs this to pay bills, etc. Left sticky note for rn and pcp. She would like it emailed to : [email or web address] César Clemente  3/28/2020    Gertrude from [facility] said they will not take pt back. niya on Monday to follow up with daughter for philip choices.  César Clemente  3/28/2020

## 2020-03-28 NOTE — PROGRESS NOTES
Hospitalist Progress Note      PCP: Nicole Gilbert MD    Date of Admission: 3/27/2020    Chief Complaint:  Metastatic cancer    Hospital Course: *76 y.o. male who presented to [facility] with past medical history of paroxysmal atrial fibrillation, moderate protein energy malnutrition, metastatic prostate cancer with bone, kidney and liver lesions,**  Family cant take care of him at home, and want him to go to hospice/snf. Subjective: **having breakthrough pain      Medications:  Reviewed    Infusion Medications    sodium chloride 50 mL/hr at 03/27/20 2307    dextrose       Scheduled Medications    amiodarone  200 mg Oral Daily    amLODIPine  5 mg Oral Daily    enzalutamide  160 mg Oral Dinner    fentaNYL  1 patch Transdermal Q72H    metoprolol  100 mg Oral BID    potassium chloride  20 mEq Oral Daily    cefepime  2 g Intravenous Q12H    sodium chloride   Intravenous Q12H    sodium chloride flush  10 mL Intravenous 2 times per day     PRN Meds: labetalol, sodium chloride flush, fentanNYL, glucose, dextrose, glucagon (rDNA), dextrose, morphine, sodium chloride flush, acetaminophen **OR** acetaminophen      Intake/Output Summary (Last 24 hours) at 3/28/2020 1542  Last data filed at 3/28/2020 1519  Gross per 24 hour   Intake 1310 ml   Output 1450 ml   Net -140 ml       Exam:    BP (!) 173/106   Pulse 91   Temp 97.3 °F (36.3 °C) (Temporal)   Resp 20   Ht 5' 10\" (1.778 m)   Wt 172 lb (78 kg)   SpO2 97%   BMI 24.68 kg/m²           Gen: *well developed  HEENT: NC/AT, moist mucous membranes, no oropharyngeal erythema or exudate  Neck: supple, trachea midline, no anterior cervical or SC LAD  Heart:  Normal s1/s2, RRR, no murmurs, gallops, or rubs. Lungs:  *cta * bilaterally, Abd: bowel sounds present, soft, nontender, nondistended, no masses  Extrem:  No clubbing, cyanosis,  *pos edema  Skin: no rashes or lesions  Psych: A & O x3  Neuro: grossly intact, moves all four extremities.     Capillary Refill: Brisk,< 3 seconds   Peripheral Pulses: +2 palpable, equal bilaterally               Labs:   Recent Labs     03/27/20  1427   WBC 12.7*   HGB 10.7*   HCT 35.4*        Recent Labs     03/26/20  1146 03/27/20  1427    140   K 4.5 3.6    104   CO2 18* 13*   BUN 24* 27*   CREATININE 1.3* 1.2   CALCIUM 8.3* 8.4*     Recent Labs     03/27/20  1427   AST 20   ALT 17   BILITOT 0.4   ALKPHOS 75     Recent Labs     03/27/20  1427   INR 1.2     Recent Labs     03/27/20  1427   TROPONINI <0.01     Recent Labs     03/27/20  1427   AST 20   ALT 17   BILITOT 0.4   ALKPHOS 75     Recent Labs     03/27/20  1427   LACTA 1.3     No results found for: Brennan Pierson  Lab Results   Component Value Date    AMMONIA 43.0 03/27/2020    AMMONIA 32.0 03/20/2020       Assessment:    Active Hospital Problems    Diagnosis Date Noted    Metastatic cancer ([facility] [C79.9] 03/27/2020    Bilateral pleural effusion [J90] 03/27/2020    PNA (pneumonia) [J18.9] 03/27/2020    Sepsis ([facility] [A41.9] 03/27/2020    Hypertensive urgency [I16.0] 03/27/2020    Acute metabolic encephalopathy [H96.65] 03/27/2020    High anion gap metabolic acidosis [A17.4] 03/27/2020    KAYLEEN (acute kidney injury) ([facility] [N17.9] 03/27/2020    Malignant ascites [R18.0] 03/27/2020    Rectal mass [K62.89] 03/27/2020    Atrial fibrillation with RVR ([facility] [I48.91] 03/27/2020    Cancer related pain [G89.3]     UTI (urinary tract infection) [N39.0] 03/20/2020    Anemia [D64.9]     Moderate protein-calorie malnutrition ([facility] [E44.0] 04/23/2019    Hepatic lesion [K76.9] 04/23/2019    Primary prostate cancer with metastasis from prostate to other site [facility]) Robyn Suresh        Plan:   Will start norco for break through pain      DVT Prophylaxis:lovenox  Diet: DIET FULL LIQUID;  Code Status: DNR-CC    PT/OT Eval Status: na    Dispo - *hospice       Electronically signed by Silvestre Fernando DO on 3/28/2020 at 3:42 PM [facility]

## 2020-03-28 NOTE — H&P
Hospital Medicine History & Physical      PCP: Nidia Cummings MD    Date of Admission: 3/27/2020    Date of Service: Pt seen/examined on 3/27/2020 and Placed in Observation. Chief Complaint: Shortness of breath, fatigue and altered mental status      History Of Present Illness:      76 y.o. male who presented to [facility] with past medical history of paroxysmal atrial fibrillation, moderate protein energy malnutrition, metastatic prostate cancer with bone, kidney and liver lesions, hypertension, asthma, GI bleed and anemia. Patient was in the hospital from 3/20 to 3/26/2020, admitted for pneumonia, UTI, found to have anorectal and lung masses on CT scan. Patient's family had not seen him for 2 weeks, they requested him to be discharged home, patient was sent to his girlfriend's house where his family members came to visit him. According to daughter, patient has been very weak, unable to sit up in bed, carry out conversation or do anything. Patient is seen by bedside, he reports having generalized body aches and pain, he is not able to characterize his pain any further. He does have change in mental status, mostly extreme lethargy, this is constant, moderate in intensity and associated with shortness of breath. Patient is not able to carry out conversation. Vital signs notable for blood pressure 174/111, pulse 118, respiratory rate 31. Labs showed white count of 12.7, hemoglobin 10.7, CO2 13, anion gap 23, glucose 149, creatinine is up from 0.9-1.2. EKG shows sinus tachycardia rate of 122 with PVCs. CT scan of the head is negative for any acute findings. Doppler ultrasound negative for DVT, did show lymphadenopathy. CTA of the chest shows large right pleural effusion and small left pleural effusion, cardiomegaly, ascending aortic aneurysm measuring 4.2 cm, malignant ascites with intra-abdominal carcinomatosis, left kidney mass, anorectal mass and bony mets.     He is being admitted for further management. Past Medical History:          Diagnosis Date    Arthritis     Asthma     GIB (gastrointestinal bleeding)     History of blood transfusion     Hypertension     Paroxysmal atrial flutter ([facility] 4/21/2019    Primary prostate cancer with metastasis from prostate to other site [facility]) 03/2018    already had metastasized.  Sleep apnea        Past Surgical History:          Procedure Laterality Date    CHOLECYSTECTOMY      COLON SURGERY      scar tissue removed     COLONOSCOPY N/A 4/24/2019    COLONOSCOPY DIAGNOSTIC performed by Willie Dias MD at [address] COLONOSCOPY N/A 11/1/2019    COLONOSCOPY CONTROL HEMORRHAGE performed by Caroline Drew DO at [address]  2/3/2020    COLONOSCOPY CONTROL HEMORRHAGE performed by Caroline Drew DO at [address] N/A 12/9/2019    FLEXIBLE SIGMOIDOSCOPY, CONTROL HEMORRHAGE performed by Caroline Drew DO at [address] 2/3/2020    [address] [id number]) performed by Caroline Drew DO at [address]  12/09/2019    SINUS SURGERY         Medications Prior to Admission:      Prior to Admission medications    Medication Sig Start Date End Date Taking? Authorizing Provider   vitamin D (CHOLECALCIFEROL) 25 MCG (1000 UT) TABS tablet Take 3,000 Units by mouth 2 times daily   Yes Historical Provider, MD   cefepime (MAXIPIME) infusion Infuse 2 g intravenously every 12 hours Compound per protocol   Yes Historical Provider, MD   potassium chloride (KLOR-CON M) 20 MEQ extended release tablet Take 20 mEq by mouth daily   Yes Historical Provider, MD   fentaNYL (DURAGESIC) 25 MCG/HR Place 1 patch onto the skin every 72 hours for 30 days.  3/27/20 4/26/20 Yes HAMLET Lazo - CNP   mirtazapine (REMERON) 15 MG tablet Take 15 mg by mouth nightly   Yes Historical Provider, MD   amLODIPine (NORVASC) 5 Trachea midline. Respiratory: Increased work of breathing. Diminished air movements otherwise clear to auscultation, bilaterally without Rales/Wheezes/Rhonchi. Cardiovascular: Tachycardic, irregular rate and rhythm with normal S1/S2 without murmurs, rubs or gallops. Abdomen: Soft, non-tender,  Mildly distended with normal bowel sounds. Musculoskeletal:  No clubbing/cyanosis, trace edema bilaterally. Limited range of motion without deformity. Skin: Skin color, texture, turgor normal.  No rashes or lesions. Neurologic: Lethargic, hard to arouse, easily goes back to sleep   psychiatric: Lethargic, thought content inappropriate, impaired insight  Capillary Refill: Brisk,< 3 seconds   Peripheral Pulses:  palpable, equal bilaterally       Labs:     Recent Labs     03/27/20  1427   WBC 12.7*   HGB 10.7*   HCT 35.4*        Recent Labs     03/26/20  1146 03/27/20  1427    140   K 4.5 3.6    104   CO2 18* 13*   BUN 24* 27*   CREATININE 1.3* 1.2   CALCIUM 8.3* 8.4*     Recent Labs     03/27/20  1427   AST 20   ALT 17   BILITOT 0.4   ALKPHOS 75     Recent Labs     03/27/20  1427   INR 1.2     Recent Labs     03/27/20  1427   TROPONINI <0.01       Urinalysis:      Lab Results   Component Value Date    NITRU POSITIVE 03/20/2020    WBCUA >20 03/20/2020    BACTERIA MANY 03/20/2020    RBCUA 5-10 03/20/2020    BLOODU MODERATE 03/20/2020    SPECGRAV 1.025 03/20/2020    GLUCOSEU Negative 03/20/2020       Radiology:   Reviewed and documented  US DUP LOWER EXTREMITY RIGHT SHEILA   Final Result      1. No evidence to suggest deep venous thrombosis of the right lower   extremity. 2. Enlarged lymph nodes are seen at level of the right groin. Clinical   correlation recommended. CT Head WO Contrast   Final Result      No acute intracranial hemorrhage or edema. If clinical concern exists   for acute stroke, MRI brain with diffusion-weighted imaging could be   helpful for further evaluation. CTA CHEST W CONTRAST   Final Result      1. No evidence of acute pulmonary embolic disease. Large right pleural   effusion with adjacent atelectasis. Relatively small left pleural   effusion with adjacent atelectasis. 2. Cardiomegaly. Dilated ascending aorta at 4.2 cm. 3. Ascites with malignant carcinomatosis. Malignant mass at the upper   pole the left kidney and multiple hepatic metastases. 4. Dilated lower GI tract with a relatively obstructing mass in the   anorectal region. 5. Extensive osseous metastatic disease in the thorax, abdomen and   pelvis. CT ABDOMEN PELVIS W IV CONTRAST Additional Contrast? None   Final Result      1. No evidence of acute pulmonary embolic disease. Large right pleural   effusion with adjacent atelectasis. Relatively small left pleural   effusion with adjacent atelectasis. 2. Cardiomegaly. Dilated ascending aorta at 4.2 cm. 3. Ascites with malignant carcinomatosis. Malignant mass at the upper   pole the left kidney and multiple hepatic metastases. 4. Dilated lower GI tract with a relatively obstructing mass in the   anorectal region. 5. Extensive osseous metastatic disease in the thorax, abdomen and   pelvis.              ASSESSMENT:    Active Hospital Problems    Diagnosis Date Noted    Metastatic cancer ([address] [C79.9] 03/27/2020    Bilateral pleural effusion [J90] 03/27/2020    PNA (pneumonia) [J18.9] 03/27/2020    Sepsis ([address] [A41.9] 03/27/2020    Hypertensive urgency [I16.0] 03/27/2020    Acute metabolic encephalopathy [I45.14] 03/27/2020    High anion gap metabolic acidosis [Q31.4] 03/27/2020    KAYLEEN (acute kidney injury) ([address] [N17.9] 03/27/2020    Malignant ascites [R18.0] 03/27/2020    Rectal mass [K62.89] 03/27/2020    Cancer related pain [G89.3]     Anemia [D64.9]     Moderate protein-calorie malnutrition ([address] [E44.0] 04/23/2019    Hepatic lesion [K76.9] 04/23/2019    Primary prostate cancer with metastasis from prostate [facility] other [facility] Venkatesh Lenny    . UTI  . afib RVR/sinus tachycardia  . Ascending aortic aneurysm    PLAN:  #1.  Sepsis, multifactorial, patient with pneumonia and UTI. Continue current antibiotic, patient is on cefepime. He is being planned for hospice care tomorrow. #2.  Bilateral pleural effusions likely malignant versus parapneumonic. #3. Pneumonia, continue current medication  #4. UTI, current antibiotic should cover  #5. Metastatic prostate cancer for hospice care. #6.  Acute kidney injury secondary to severe dehydration. IV fluids. #7.  Hypertensive urgency, PRN labetalol  #8. A. fib RVR. Resume beta-blocker. #9.  High anion gap metabolic acidosis secondary to above  #10. Malignant ascites  #11. Acute metabolic encephalopathy secondary to multiple problems above. CT scan of the head is negative. Monitor blood sugars. #12.  4.2 cm ascending aortic aneurysm  #13. Anorectal mass possibly malignant  #14. Cancer related pain, continue pain control with focus on comfort care. #15. Anemia, monitor hemoglobin  #16. Moderate to severe protein energy malnutrition  #17. Debility  #18. Patient is very ill given all of the above in the setting of metastatic disease. He is appropriate for hospice care. Will consult . DVT Prophylaxis: SCDs  Diet: No diet orders on file n.p.o. advance diet as tolerated  Code Status: DNR-CC    PT/OT Eval Status: As needed    Dispo -observation/telemetry       Maday Gregory MD    Thank you uRt Desouza MD for the opportunity to be involved in this patient's care.

## 2020-03-28 NOTE — PROGRESS NOTES
Liaison Informational Visit Note      Referral received from [facility]      Patient Name: Windy Vargas   :  1945  MRN:  [id number]    Admit date:  3/27/2020      Hospital Admitting Physician:  Fredis Randolph MD   PCP:  Jorge A Mathias MD      Primary Insurance: Payor: Art Son /  /  /    Secondary Insurance:  UNKNOWN    Emergency Contact:      Contact/Relation:   /         Phone:       Contact/Relation:   /     Phone:     Advance Directive  Advance directives received No  Patient has a documented healthcare surrogate  Discussed with: Family member  DPOA-HC Name-Relation:    Phone:       Terminal Diagnosis Metastatic Prostate Ca as confirmed by Dr. Trent Copeland, [address] Problem List:   Patient Active Problem List   Diagnosis Code    Dog bite of right thigh E22.165J, W54. 0XXA    GI bleed K92.2    Primary prostate cancer with metastasis from prostate to other site [facility]) C61    Hypertension I10    GIB (gastrointestinal bleeding) K92.2    Asthma J45.909    Paroxysmal atrial flutter (HCC) I48.92    Hepatic lesion K76.9    Hypokalemia E87.6    Moderate protein-calorie malnutrition (HCC) E44.0    Anemia D64.9    Gastroesophageal reflux disease K21.9    UTI (urinary tract infection) N39.0    Cancer related pain G89.3    Metastatic cancer (HCC) C79.9    Bilateral pleural effusion J90    PNA (pneumonia) J18.9    Sepsis (HCC) A41.9    Hypertensive urgency I16.0    Acute metabolic encephalopathy I85.43    High anion gap metabolic acidosis M29.4    KAYLEEN (acute kidney injury) ([address] N17.9    Malignant ascites R18.0    Rectal mass K62.89    Atrial fibrillation with RVR (HCC) I48.91       Code Status Order: DNR-CC     Past Medical History:        Diagnosis Date    Arthritis     Asthma     GIB (gastrointestinal bleeding)     History of blood transfusion     Hypertension     Paroxysmal atrial flutter ([address] 2019    Primary prostate cancer with metastasis from prostate including the hospice house for short term symptom management. Once symptoms are managed patient would transfer to a routine level of care either home or ECF with hospice. Daughter expressed that patient can not go home. He has a girlfriend and she can not take care of him at home. Explained room and board would be private pay at the facility. Discussed roles and frequency visits of skilled nursing, personal care team, and SW.     Daughter Elgin Marques stated patient will need to be placed in an ECF with hospice care. She has not seen her father since the beginning of March and knows he is declining. Patient's girlfriend is unable to provide care for him in the home. Patient has a long term care policy and it will pay for patient's room and board after three months of private pay. Elgin Marques stated her first choice is [employer]. She does not have a second choice as of now. She would also like to speak with a hospital SW during this admission. Elgin Marques asked that [facility] continue to follow patient while in the hospital. [facility] phone number provided to Elgin Marques. Asked her to please call with any questions or needs. Elgin Marques asked that I tell her dad that she loves him and has everything under control. Emotional support and active listening provided. Tanja Kumar RN. Visit made to room. Spoke with patient and relayed [facility] [place] message. Assisted patient with breakfast. Received call from James Mcgovern and provided update. Will continue to follow. [facility] plan:  1. Patient will need ECF placement with hospice care. Daughter would like to speak with SW.  2. Hospice is not managing any aspect of the patient's care presently. 3. Please call [facility] with any questions.   Electronically signed by Bernard Watkins RN on 3/28/2020 at 10:46 AM

## 2020-03-29 NOTE — PROGRESS NOTES
[facility] an NP from [email or web address] called and they are going to help transition the patient into hospice care at a facility. They will work with daughter Any Brand about potentially getting him set up in a SNF with hospice near where she lives. The on call number for [email or web address] is [phone].  [facility] the NP who is helping coordinate this transition can be reached directly at [phone]

## 2020-03-29 NOTE — CARE COORDINATION
MARTHA Note: Received notification from Elieser Bernabe, nursing that patients daugther, POA, wishes to speak to CM/SW. Call placed to Jina Seth and message left for her to return call as there was no answer.

## 2020-03-29 NOTE — PLAN OF CARE
Problem: Pain:  Goal: Patient's pain/discomfort is manageable  Description: Patient's pain/discomfort is manageable  Outcome: Met This Shift     Problem: Falls - Risk of:  Goal: Will remain free from falls  Description: Will remain free from falls  Outcome: Met This Shift     Problem: Falls - Risk of:  Goal: Absence of physical injury  Description: Absence of physical injury  Outcome: Met This Shift

## 2020-03-29 NOTE — PROGRESS NOTES
Hospitalist Progress Note      PCP: Wendy Saeed MD    Date of Admission: 3/27/2020    Chief Complaint: * metastatic cancer     Hospital Course: *76 y. o. male who presented to [facility] with past medical history of paroxysmal atrial fibrillation, moderate protein energy malnutrition, metastatic prostate cancer with bone, kidney and liver lesions,**  Family cant take care of him at home, and want him to go to hospice/snf. * [facility] would not take him back due to him  Having gone home     Subjective: * states pain medication working        Medications:  Reviewed    Infusion Medications    sodium chloride 50 mL/hr at 03/27/20 2307    dextrose       Scheduled Medications    amiodarone  200 mg Oral Daily    amLODIPine  5 mg Oral Daily    enzalutamide  160 mg Oral Dinner    fentaNYL  1 patch Transdermal Q72H    metoprolol  100 mg Oral BID    potassium chloride  20 mEq Oral Daily    cefepime  2 g Intravenous Q12H    sodium chloride   Intravenous Q12H    sodium chloride flush  10 mL Intravenous 2 times per day     PRN Meds: labetalol, HYDROcodone 5 mg - acetaminophen, hydrOXYzine, sodium chloride flush, fentanNYL, glucose, dextrose, glucagon (rDNA), dextrose, morphine, sodium chloride flush, acetaminophen **OR** acetaminophen      Intake/Output Summary (Last 24 hours) at 3/29/2020 1417  Last data filed at 3/29/2020 0604  Gross per 24 hour   Intake 525 ml   Output 1050 ml   Net -525 ml       Exam:    /78   Pulse 98   Temp 97.8 °F (36.6 °C) (Temporal)   Resp 18   Ht 5' 10\" (1.778 m)   Wt 172 lb (78 kg)   SpO2 97%   BMI 24.68 kg/m²       Gen: *well developed  HEENT: NC/AT, moist mucous membranes, no oropharyngeal erythema or exudate  Neck: supple, trachea midline, no anterior cervical or SC LAD  Heart:  Normal s1/s2, RRR, no murmurs, gallops, or rubs.    Lungs:  *cta * bilaterally, Abd: bowel sounds present, soft, nontender, pos distended, no masses  Extrem:  No clubbing, cyanosis,  *pos edema  Skin: no rashes or lesions  Psych: A & O x3  Neuro: grossly intact, moves all four extremities.     Capillary Refill: Brisk,< 3 seconds   Peripheral Pulses: +2 palpable, equal bilaterally           Labs:   Recent Labs     03/27/20  1427   WBC 12.7*   HGB 10.7*   HCT 35.4*        Recent Labs     03/27/20  1427      K 3.6      CO2 13*   BUN 27*   CREATININE 1.2   CALCIUM 8.4*     Recent Labs     03/27/20  1427   AST 20   ALT 17   BILITOT 0.4   ALKPHOS 75     Recent Labs     03/27/20  1427   INR 1.2     Recent Labs     03/27/20  1427   TROPONINI <0.01     Recent Labs     03/27/20  1427   AST 20   ALT 17   BILITOT 0.4   ALKPHOS 75     Recent Labs     03/27/20  1427   LACTA 1.3     No results found for: Angie Cool  Lab Results   Component Value Date    AMMONIA 43.0 03/27/2020    AMMONIA 32.0 03/20/2020       Assessment:    Active Hospital Problems    Diagnosis Date Noted    [facility] [C79.9] 03/27/2020    Bilateral pleural effusion [J90] 03/27/2020    PNA (pneumonia) [J18.9] 03/27/2020    Sepsis ([facility] [A41.9] 03/27/2020    Hypertensive urgency [I16.0] 03/27/2020    Acute metabolic encephalopathy [M11.91] 03/27/2020    High anion gap metabolic acidosis [Q94.5] 03/27/2020    KAYLEEN (acute kidney injury) ([facility] [N17.9] 03/27/2020    Malignant ascites [R18.0] 03/27/2020    Rectal mass [K62.89] 03/27/2020    Atrial fibrillation with RVR (HCC) [I48.91] 03/27/2020    Cancer related pain [G89.3]     UTI (urinary tract infection) [N39.0] 03/20/2020    Anemia [D64.9]     Moderate protein-calorie malnutrition ([facility] [E44.0] 04/23/2019    Hepatic lesion [K76.9] 04/23/2019    Primary prostate cancer with metastasis from prostate to other site ([facility] [C61]        Plan:cont cordarone   cont norvasc   cont cefepime   cont lopressor      DVT Prophylaxis: *scd  Diet: DIET DENTAL SOFT;  Code Status: DNR-CC    PT/OT Eval Status: na    Dispo - *hospice      Electronically signed by Shelton Fothergill, DO on 3/29/2020 at 2:17 PM [facility]

## 2020-03-30 NOTE — PROGRESS NOTES
Call placed to [place], [place]. Had a lengthy conversation with [place] regarding hospice and ECF placement. Daughter frustrated that she can not see patient. Provided [place] with patient's phone number at the bedside. She stated she was looking into transferring patient to [facility] area however she does not know if he would be able to handle to trip. She is interesting into seeing if [address] [facility] or [facility] has any beds available. [place] is requesting to speak with MARTHA or SW. [place] also stated patient is Anglican and would like spiritual care to see patient. Answered all questions relating to hospice care. Visit made to unit. Patient is awake and alert. States he ate breakfast today. Spoke with spiritual care and will make a visit. Provided update to Aramis Aggarwal RN charge nurse. Call placed to Jair Back RN CM and provided update.

## 2020-03-30 NOTE — CARE COORDINATION
I called the POA (the daughter) Mariposa Soto and asked for more skilled choices for facilities as [facility] still declines the patient  Because he went home with the girlfriend. Mariposa Soto has chose 1) [facility] [address] and [employer]. Ref made to Dudley Mcduffie await her work up. Per Hospice who is also following  Emeterio Cosby it maybe  private pay as HOT is following .

## 2020-03-30 NOTE — PROGRESS NOTES
Hospitalist Progress Note      PCP: Kirsten Albarado MD    Date of Admission: 3/27/2020    Chief Complaint: **metastatic cancer      Hospital Course: *76 y. o. male who presented to [facility] with past medical history of paroxysmal atrial fibrillation, moderate protein energy malnutrition, metastatic prostate cancer with bone, kidney and liver lesions,**  Family cant take care of him at home, and want him to go to hospice/snf.   * [facility] would not take him back due to him  Having gone home * awaiting precert from 2 different  facilities        Subjective: **no complaints      Medications:  Reviewed    Infusion Medications    sodium chloride 50 mL/hr at 03/27/20 2307    dextrose       Scheduled Medications    amiodarone  200 mg Oral Daily    amLODIPine  5 mg Oral Daily    enzalutamide  160 mg Oral Dinner    fentaNYL  1 patch Transdermal Q72H    metoprolol  100 mg Oral BID    potassium chloride  20 mEq Oral Daily    cefepime  2 g Intravenous Q12H    sodium chloride   Intravenous Q12H    sodium chloride flush  10 mL Intravenous 2 times per day     PRN Meds: labetalol, HYDROcodone 5 mg - acetaminophen, hydrOXYzine, sodium chloride flush, fentanNYL, glucose, dextrose, glucagon (rDNA), dextrose, morphine, sodium chloride flush, acetaminophen **OR** acetaminophen      Intake/Output Summary (Last 24 hours) at 3/30/2020 1510  Last data filed at 3/30/2020 1318  Gross per 24 hour   Intake 1387 ml   Output 575 ml   Net 812 ml       Exam:    /84   Pulse 84   Temp 98.9 °F (37.2 °C) (Temporal)   Resp 22   Ht 5' 10\" (1.778 m)   Wt 179 lb 14.4 oz (81.6 kg)   SpO2 96%   BMI 25.81 kg/m²       Gen: *well developed  HEENT: NC/AT, moist mucous membranes, no oropharyngeal erythema or exudate  Neck: supple, trachea midline, no anterior cervical or SC LAD  Extrem:  No clubbing, cyanosis,  *pos edema  Skin: no rashes or lesions  Psych: A & O x3  Neuro: grossly intact,  Capillary Refill: Brisk,< 3 seconds Peripheral Pulses: +2 palpable, equal bilaterally              Labs:   No results for input(s): WBC, HGB, HCT, PLT in the last 72 hours. No results for input(s): NA, K, CL, CO2, BUN, CREATININE, CALCIUM, PHOS in the last 72 hours. Invalid input(s): MAGNES  No results for input(s): AST, ALT, BILIDIR, BILITOT, ALKPHOS in the last 72 hours. No results for input(s): INR in the last 72 hours. No results for input(s): Geofm Squibb in the last 72 hours. No results for input(s): AST, ALT, ALB, BILIDIR, BILITOT, ALKPHOS in the last 72 hours. No results for input(s): LACTA in the last 72 hours.   No results found for: Natalie Karen  Lab Results   Component Value Date    AMMONIA 43.0 03/27/2020    AMMONIA 32.0 03/20/2020       Assessment:    Active Hospital Problems    Diagnosis Date Noted    Metastatic cancer ([facility] [C79.9] 03/27/2020    Bilateral pleural effusion [J90] 03/27/2020    PNA (pneumonia) [J18.9] 03/27/2020    Sepsis ([facility] [A41.9] 03/27/2020    Hypertensive urgency [I16.0] 03/27/2020    Acute metabolic encephalopathy [B69.25] 03/27/2020    High anion gap metabolic acidosis [A60.6] 03/27/2020    KAYLEEN (acute kidney injury) ([facility] [N17.9] 03/27/2020    Malignant ascites [R18.0] 03/27/2020    Rectal mass [K62.89] 03/27/2020    Atrial fibrillation with RVR (HCC) [I48.91] 03/27/2020    Cancer related pain [G89.3]     UTI (urinary tract infection) [N39.0] 03/20/2020    Anemia [D64.9]     Moderate protein-calorie malnutrition ([facility] [E44.0] 04/23/2019    Hepatic lesion [K76.9] 04/23/2019    Primary prostate cancer with metastasis from prostate to other site [facility]        Plan:  *cont cordarone   cont norvasc   cont cefepime   cont lopressor        DVT Prophylaxis: *scd  Diet: DIET DENTAL SOFT;  Code Status: DNR-CC     PT/OT Eval Status: na     Dispo - *hospice**      Electronically signed by Ese Tran DO on 3/30/2020 at 3:10 PM [facility]

## 2020-03-31 NOTE — PROGRESS NOTES
Spoke with Amita Barbosa, daughter and POA regarding the Transitions program at the [address] [place] [address] pay for room/board of $300 a day. Amita Barbosa would like to proceed with this. I contacted Dimitri Long to update. Duc Mojica will contact Amita Barbosa regarding the payment. I did update Amita Barbosa. Amita Barbosa will need consents emailed to her at [email or web address]. Once consents and payment have been received, I will set up for transport to the [facility]. Update to , charge nurse and Dr. Darryle Bedford.

## 2020-03-31 NOTE — ADT AUTH CERT
Medical Oncology [address] Day 3 (3/30/2020) by Yolanda Madden RN         Review Status Review Entered   Completed 3/31/2020 12:52       Criteria Review      Care Day: 3 Care Date: 3/30/2020 Level of Care: Inpatient Floor    Guideline Day 3    Level Of Care    ( ) * Activity level acceptable    ( ) * Complete discharge planning    Clinical Status    ( ) * Pain and nausea absent or adequately managed    ( ) * Temperature status acceptable    ( ) * No infection, or status acceptable    ( ) * No neutropenia, or status acceptable    ( ) * Abdominal status acceptable    ( ) * Mucositis absent or adequately resolved    ( ) * Diarrhea absent or adequately controlled    ( ) * Blood cell count acceptable    ( ) * Hematologic complications absent or stabilized    ( ) * Neurologic status acceptable    ( ) * Electrolyte status acceptable    ( ) * Tumor lysis absent or resolved    ( ) * Malignant effusions absent or adequately controlled    ( ) * Pathologic fracture absent or stabilized    ( ) * General Discharge Criteria met    Interventions    ( ) * Intake acceptable    ( ) * No inpatient interventions needed    * Milestone   Additional Notes   03/30/2020      VS:98.1-91-/79-93% on 2 liters n/c      IVf @ 50ml/hr       Per Internal Medicine-Plan:   *cont cordarone 200 mg daily    cont norvasc 5 mg daily     cont cefepime IV 2g q12     cont lopressor 100 mg bid      SNF at discharge            Medical Oncology [address] Day 2 (3/29/2020) by Yolanda Madden RN         Review Status Review Entered   Completed 3/31/2020 12:47       Criteria Review      Care Day: 2 Care Date: 3/29/2020 Level of Care: Inpatient Floor    Guideline Day 2    Level Of Care    (X) Floor    3/31/2020 12:47 PM EDT by Eric La      med/surg    Clinical Status    (X) * No ICU or intermediate care needs    * Milestone   Additional Notes   03/29/2020   Patient remains IPE   Rhonchi luc      VS:/78   Pulse 98   Temp 97.8 °F (36.6 °C) (Temporal)   Resp 18        Per Internal medicine-Plan:cont cordarone    cont norvasc    cont cefepime IV 2 g q12    cont lopressor          Physician Advisor Inpatient Recommedation Letter by Amanda Cortez RN         Review Status Review Entered   In Primary 3/28/2020 10:18       Criteria Review   slr obs to in  Letter of Status Determination: Status   INPATIENT / is Appropriate        Pt Name: Humera Andre  MR# [id number]  [facility]# [id number]  [address] [place]  Hospitalization date [unfilled]    Principal diagnosis [unfilled]   Clinicals 76 y.o. male hospitalized with Sepsis, multifactorial, patient with  pneumonia and UTI. Continue current antibiotic, patient is on cefepime. He is  being planned for hospice care tomorrow. #2. Bilateral pleural effusions likely malignant versus parapneumonic. #3. Pneumonia, continue current medication  #4. UTI, current antibiotic should cover  #5. Metastatic prostate cancer for hospice care. #6. Acute kidney injury secondary to severe dehydration. IV fluids.   #7. Hypertensive urgency, PRN labetalol    [facility] criteria   Does apply   STATUS DETERMINATION On the basis of clinical data, available documentation, we  believe that the current status of this patient as INPATIENT is Appropriate     Insurance [employer]   Additional Notes   3/28/2020  Order changed from Observation to Inpatient today

## 2020-03-31 NOTE — CARE COORDINATION
3/31 Care Coordination: Plan was for Sulema Ponce to go to [facility] or [facility]. As of today they are not taking on new patients, they are taking the patients from Acute rehab. Will reach out to Hospice to see what information they can provide me with prior to calling [phone]. Patient is DNR-CC, Continued on IV Cefepime and  His Cardiac medications. CM/RUPAL doshi to follow Adiel Garsia RN, CM    3/31 Update: Spoke with Kylah TOVAR. Told the above information. She would like to readdress the possibility for Hospice House. Spoke with Sahra and she will call Tash Gan.  Adiel Garsia RN, CM

## 2020-03-31 NOTE — PROGRESS NOTES
Scripts for Morphine, Ativan, Fentanyl patches and Zofran picked up from the pharmacy and delivered to bedside RN Deniz. Deniz instructed to send with patient to the [facility].

## 2020-04-26 NOTE — DISCHARGE SUMMARY
Hospital Medicine Discharge Summary    Patient ID: Promise Rutherford      Patient's PCP: James Win MD    Admit Date: 3/27/2020     Discharge Date: 3/31/2020      Admitting Physician: Elysia De MD     Discharge Physician: Warren Church MD     Discharge Diagnoses: Active Hospital Problems    Diagnosis Date Noted    Metastatic cancer ([facility] [C79.9] 03/27/2020    Bilateral pleural effusion [J90] 03/27/2020    PNA (pneumonia) [J18.9] 03/27/2020    Sepsis ([address] [A41.9] 03/27/2020    Hypertensive urgency [I16.0] 03/27/2020    Acute metabolic encephalopathy [H80.30] 03/27/2020    High anion gap metabolic acidosis [C21.5] 03/27/2020    KAYLEEN (acute kidney injury) ([facility] [address] [N17.9] 03/27/2020    Malignant ascites [R18.0] 03/27/2020    Rectal mass [K62.89] 03/27/2020    Atrial fibrillation with RVR ([facility] [I48.91] 03/27/2020    Cancer related pain [G89.3]     UTI (urinary tract infection) [N39.0] 03/20/2020    Anemia [D64.9]     Moderate protein-calorie malnutrition ([address] [E44.0] 04/23/2019    Hepatic lesion [K76.9] 04/23/2019    Primary prostate cancer with metastasis from prostate to other site [facility]) [place]        The patient was seen and examined on day of discharge and this discharge summary is in conjunction with any daily progress note from day of discharge. Hospital Course:   76 y. o. [facility] of paroxysmal atrial fibrillation, moderate protein energy malnutrition, metastatic prostate cancer with bone, kidney and liver lesions, Family cant take care of him at home, and want him to go to hospice/snf. Discharged to hospice facility in terminal condition on 3/31/20. Exam:     /79   Pulse 91   Temp 98.1 °F (36.7 °C) (Oral)   Resp 18   Ht 5' 10\" (1.778 m)   Wt 179 lb 14.4 oz (81.6 kg)   SpO2 93%   BMI 25.81 kg/m²     General appearance: No apparent distress, appears stated age and cooperative.  Cachectic  HEENT: Pupils equal, round, and reactive to light. Conjunctivae/corneas clear. Neck: Supple, with full range of motion. No jugular venous distention. Trachea midline. Respiratory:  Normal respiratory effort. Clear to auscultation, bilaterally without Rales/Wheezes/Rhonchi. Cardiovascular: Regular rate and rhythm with normal S1/S2 without murmurs, rubs or gallops. Abdomen: Soft, non-tender, non-distended with normal bowel sounds. Musculoskeletal: No clubbing, cyanosis or edema bilaterally. Full range of motion without deformity. Skin: Skin color, texture, turgor normal.  No rashes or lesions. Neurologic:  Neurovascularly intact without any focal sensory/motor deficits. Cranial nerves: II-XII intact, grossly non-focal.  Psychiatric: Alert and oriented, thought content appropriate, normal insight      Consults:     IP CONSULT TO HOSPICE  IP CONSULT TO INTERNAL MEDICINE  IP CONSULT TO SOCIAL WORK  IP CONSULT TO HOSPICE    Significant Diagnostic Studies:     US DUP LOWER EXTREMITY RIGHT SHEILA   Final Result      1. No evidence to suggest deep venous thrombosis of the right lower   extremity. 2. Enlarged lymph nodes are seen at level of the right groin. Clinical   correlation recommended. CT Head WO Contrast   Final Result      No acute intracranial hemorrhage or edema. If clinical concern exists   for acute stroke, MRI brain with diffusion-weighted imaging could be   helpful for further evaluation. CTA CHEST W CONTRAST   Final Result      1. No evidence of acute pulmonary embolic disease. Large right pleural   effusion with adjacent atelectasis. Relatively small left pleural   effusion with adjacent atelectasis. 2. Cardiomegaly. Dilated ascending aorta at 4.2 cm. 3. Ascites with malignant carcinomatosis. Malignant mass at the upper   pole the left kidney and multiple hepatic metastases. 4. Dilated lower GI tract with a relatively obstructing mass in the   anorectal region.    5. Extensive osseous metastatic disease in the thorax, abdomen and   pelvis. CT ABDOMEN PELVIS W IV CONTRAST Additional Contrast? None   Final Result      1. No evidence of acute pulmonary embolic disease. Large right pleural   effusion with adjacent atelectasis. Relatively small left pleural   effusion with adjacent atelectasis. 2. Cardiomegaly. Dilated ascending aorta at 4.2 cm. 3. Ascites with malignant carcinomatosis. Malignant mass at the upper   pole the left kidney and multiple hepatic metastases. 4. Dilated lower GI tract with a relatively obstructing mass in the   anorectal region. 5. Extensive osseous metastatic disease in the thorax, abdomen and   pelvis. Disposition:  Hospice in SNF     Discharge Instructions/Follow-up:    Keep scheduled follow up appointments. Take medications as prescribed. Code Status:  Prior     Activity: activity as tolerated    Diet: regular diet    Labs: For convenience and continuity at follow-up the following most recent labs are provided:      CBC:    Lab Results   Component Value Date    WBC 12.7 03/27/2020    HGB 10.7 03/27/2020    HCT 35.4 03/27/2020     03/27/2020       Renal:    Lab Results   Component Value Date     03/27/2020    K 3.6 03/27/2020    K 4.5 03/26/2020     03/27/2020    CO2 13 03/27/2020    BUN 27 03/27/2020    CREATININE 1.2 03/27/2020    CALCIUM 8.4 03/27/2020       Discharge Medications:     Discharge Medication List as of 3/31/2020  4:58 PM           Details   Morphine Sulfate (MORPHINE 20MG/ML) SOLN concentrated solution Take 0.125 mLs by mouth every 2 hours as needed for Pain for up to 20 days. , Disp-30 mL, R-0Print      LORazepam (ATIVAN) 0.5 MG tablet Take 1 tablet by mouth every 4 hours as needed for Anxiety for up to 30 days. , Disp-20 tablet, R-0Print              Details   fentaNYL (DURAGESIC) 25 MCG/HR Place 1 patch onto the skin every 72 hours for 10 days. , Disp-5 patch, R-0Print      ondansetron (ZOFRAN) 4 MG tablet Take 1 tablet by mouth every 8 hours as needed for Nausea or Vomiting, Disp-90 tablet, R-0Print              Details   mirtazapine (REMERON) 15 MG tablet Take 15 mg by mouth nightlyHistorical Med             Time Spent on discharge is more than 45 minutes in the examination, evaluation, counseling and review of medications and discharge plan.       Signed:    Gill Sow MD   4/26/2020

## 2024-10-02 NOTE — PROGRESS NOTES
ADVANCED CARE PLANNING    Patient Name: Rigoberto Champion       YOB: 1945              MRN:    [id number]  Admission Date:  3/27/2020  1:29 PM    Active Diagnoses: Active Problems:    Primary prostate cancer with metastasis from prostate to other site [facility])    Hepatic lesion    Moderate protein-calorie malnutrition (HCC)    Anemia    Cancer related pain    Metastatic cancer ([facility] [address]    Bilateral pleural effusion    PNA (pneumonia)    Sepsis (HCC)    Hypertensive urgency    Acute metabolic encephalopathy    High anion gap metabolic acidosis    KAYLEEN (acute kidney injury) ([facility]    Malignant ascites    Rectal mass    [facility]  Resolved Problems:    * No resolved hospital problems. *  . UTI    These active diagnoses are of sufficient risk that focused discussion on advanced care planning is indicated in order to allow the patient to thoughtfully consider personal goals of care; and, if situations arise that prevent the patient to personally give input, to ensure appropriate representation of their personal desire for different levels and levels of care. Persons present in discussion: Rigoberto Champion, Arpan Alarcon MD, Family members: Patient's daughter Ms Ish Ochoa via phone. Discussion: I reviewed his admission for above and his desires for ongoing aggressive care, including potential intubation and mechanical ventilation; also discussed who would speak on his behalf should he be unable to do so and discussed what conversations he has had with his family so they understand his desires if such a situation occurred now or in the future. I presented and explained the availability of our palliative care team to him, including the availability of advanced directives forms which he can review with his family and then fill out if they so wish. Daughter is GUY Ochoa is POA phone number [phone].   I had extensive discussion with patient's daughter on the phone, visitors are currently not allowed in the hospital, we discussed patient's end-of-life care goals, she does not want patient to suffer anymore. She requested that patient be transferred to hospice which I believe is appropriate at this time. Consult has been placed for hospice evaluation. Time Spent on Advanced Planning Documents: 18 minutes    Electronically signed by Garcia Graf MD on 3/27/2020 at 10:15 PM    NOTE: This report was transcribed using voice recognition software. Every effort was made to ensure accuracy; however, inadvertent computerized transcription errors may be present. Medication List as of 10/2/2024  9:15 PM          PATIENT REFERRED TO:  Follow Up with:  Reese Self DO  [address] [place] [id number] [phone]    Schedule an appointment as soon as possible for a visit       [facility] EMERGENCY DEPT  [address]  [place] [address]  [phone]  Go to   If symptoms worsen    Ranjeet Steen MD  [address] [facility]  [address]  [facility] [address] [phone]    Schedule an appointment as soon as possible for a visit         Dragon Disclaimer     Please note that this dictation was completed with [employer], the computer voice recognition software. Quite often unanticipated grammatical, syntax, homophones, and other interpretive errors are inadvertently transcribed by the computer software. Please disregard these errors. Please excuse any errors that have escaped final proofreading.      I Cosmo Constantino MD am the primary clinician of record.  Cosmo Constantino MD  (Electronically signed)            Cosmo Constantino MD  10/04/24 0448

## (undated) DEVICE — TOWEL,OR,DSP,ST,BLUE,STD,6/PK,12PK/CS: Brand: MEDLINE

## (undated) DEVICE — FIAPC® PROBE W/ FILTER 2200 A OD 2.3MM/6.9FR; L 2.2M/7.2FT: Brand: ERBE

## (undated) DEVICE — DOUBLE BASIN SET: Brand: MEDLINE INDUSTRIES, INC.

## (undated) DEVICE — CONTAINER SPEC COLL 960ML POLYPR TRIANG GRAD INTAKE/OUTPUT

## (undated) DEVICE — COVER,LIGHT HANDLE,FLX,1/PK: Brand: MEDLINE INDUSTRIES, INC.

## (undated) DEVICE — GAUZE,SPONGE,POST-OP,4X3,STRL,LF: Brand: MEDLINE

## (undated) DEVICE — YANKAUER,BULB TIP,W/O VENT,RIGID,STERILE: Brand: MEDLINE

## (undated) DEVICE — PACK,UNIVERSAL,NO GOWNS: Brand: MEDLINE

## (undated) DEVICE — SET SURG INSTR SIGMOID REUSABLE

## (undated) DEVICE — INTENDED FOR TISSUE SEPARATION, AND OTHER PROCEDURES THAT REQUIRE A SHARP SURGICAL BLADE TO PUNCTURE OR CUT.: Brand: BARD-PARKER ® STAINLESS STEEL BLADES

## (undated) DEVICE — SIGMOIDOSCOPIC SUCTION INSTRUMENT 18 FR W/WINGED CAP CONTROL AND 6 FOOT (1.8M) TUBING: Brand: SIGMOIDOSCOPIC

## (undated) DEVICE — PATIENT RETURN ELECTRODE, SINGLE-USE, CONTACT QUALITY MONITORING, ADULT, WITH 9FT CORD, FOR PATIENTS WEIGING OVER 33LBS. (15KG): Brand: MEGADYNE

## (undated) DEVICE — Device: Brand: DEFENDO VALVE AND CONNECTOR KIT

## (undated) DEVICE — TRAY PROCED CUSTOM RECTAL

## (undated) DEVICE — READY WET SKIN SCRUB TRAY-LF: Brand: MEDLINE INDUSTRIES, INC.

## (undated) DEVICE — ELECTROSURGICAL PENCIL BUTTON SWITCH E-Z CLEAN COATED BLADE ELECTRODE 10 FT (3 M) CORD HOLSTER: Brand: MEGADYNE

## (undated) DEVICE — NDL CNTR 40CT FM MAG: Brand: MEDLINE INDUSTRIES, INC.

## (undated) DEVICE — KENDALL 450 SERIES MONITORING FOAM ELECTRODE - RECTANGULAR SHAPE ( 3/PK): Brand: KENDALL

## (undated) DEVICE — GAUZE,SPONGE,4"X4",16PLY,XRAY,STRL,LF: Brand: MEDLINE

## (undated) DEVICE — GOWN ISOLATN REG YEL M WT MULTIPLY SIDETIE LEV 2

## (undated) DEVICE — GOWN,SIRUS,NONRNF,SETINSLV,XL,20/CS: Brand: MEDLINE

## (undated) DEVICE — DEFENDO AIR WATER SUCTION AND BIOPSY VALVE KIT FOR  OLYMPUS: Brand: DEFENDO AIR/WATER/SUCTION AND BIOPSY VALVE

## (undated) DEVICE — SYRINGE IRRIG 60ML SFT PLIABLE BLB EZ TO GRP 1 HND USE W/

## (undated) DEVICE — KIT BEDSIDE REVITAL OX 500ML

## (undated) DEVICE — GLOVE ORANGE PI 7 1/2   MSG9075

## (undated) DEVICE — GAUZE,SPONGE,4"X4",16PLY,STRL,LF,10/TRAY: Brand: MEDLINE

## (undated) DEVICE — MASK,FACE,MAXFLUIDPROTECT,SHIELD/ERLPS: Brand: MEDLINE

## (undated) DEVICE — LUBRICANT SURG JELLY ST BACTER TUBE 4.25OZ

## (undated) DEVICE — VASELINE PETROLATUM GAUZE STRIP: Brand: VASELINE

## (undated) DEVICE — TUBING, SUCTION, 1/4" X 10', STRAIGHT: Brand: MEDLINE

## (undated) DEVICE — CONNECTOR TBNG AUX H2O JET DISP FOR OLY 160/180 SER

## (undated) DEVICE — MARKER,SKIN,WI/RULER AND LABELS: Brand: MEDLINE

## (undated) DEVICE — CANNULA NSL ORAL AD FOR CAPNOFLEX CO2 O2 AIRLFE

## (undated) DEVICE — SPONGE GZ 4IN 4IN 4 PLY N WVN AVANT